# Patient Record
Sex: FEMALE | Race: BLACK OR AFRICAN AMERICAN | NOT HISPANIC OR LATINO | URBAN - METROPOLITAN AREA
[De-identification: names, ages, dates, MRNs, and addresses within clinical notes are randomized per-mention and may not be internally consistent; named-entity substitution may affect disease eponyms.]

---

## 2017-03-16 ENCOUNTER — EMERGENCY (EMERGENCY)
Facility: HOSPITAL | Age: 82
LOS: 1 days | Discharge: HOME | End: 2017-03-16
Attending: EMERGENCY MEDICINE | Admitting: EMERGENCY MEDICINE
Payer: COMMERCIAL

## 2017-03-16 VITALS
HEART RATE: 89 BPM | RESPIRATION RATE: 18 BRPM | OXYGEN SATURATION: 98 % | TEMPERATURE: 98 F | SYSTOLIC BLOOD PRESSURE: 140 MMHG | DIASTOLIC BLOOD PRESSURE: 65 MMHG

## 2017-03-16 DIAGNOSIS — M25.512 PAIN IN LEFT SHOULDER: ICD-10-CM

## 2017-03-16 PROCEDURE — 73030 X-RAY EXAM OF SHOULDER: CPT | Mod: 26,LT

## 2017-03-16 PROCEDURE — 99284 EMERGENCY DEPT VISIT MOD MDM: CPT | Mod: 25

## 2017-03-16 PROCEDURE — 72125 CT NECK SPINE W/O DYE: CPT

## 2017-03-16 PROCEDURE — 73030 X-RAY EXAM OF SHOULDER: CPT

## 2017-03-16 PROCEDURE — 70450 CT HEAD/BRAIN W/O DYE: CPT | Mod: 26

## 2017-03-16 PROCEDURE — 99284 EMERGENCY DEPT VISIT MOD MDM: CPT

## 2017-03-16 PROCEDURE — 72125 CT NECK SPINE W/O DYE: CPT | Mod: 26

## 2017-03-16 PROCEDURE — 70450 CT HEAD/BRAIN W/O DYE: CPT

## 2017-03-16 RX ORDER — ACETAMINOPHEN 500 MG
650 TABLET ORAL ONCE
Qty: 0 | Refills: 0 | Status: DISCONTINUED | OUTPATIENT
Start: 2017-03-16 | End: 2017-03-20

## 2017-03-16 NOTE — ED PROVIDER NOTE - OBJECTIVE STATEMENT
89yof pmhx of RA, hx of TIA BIB EMS s/p mechanical fall at home onto hardwood floor with forehead impact. No loc. No headache, no nausea or vomiting. No hip pain or abd pain. Pt was able to walk down the horvath and stairs at home with the EMS. No neck pain. Daughters at bedside reports no ams and ambulation at home with walker

## 2017-03-16 NOTE — ED PROVIDER NOTE - CARE PLAN
Instructions for follow-up, activity and diet:	1. Follow up with your PMD within 48-72 hours.  2. Rest, Take Tylenol 650mg 1 tab every 4-6 hours as needed for pain .   3. Any nausea, vomiting, worsening pain, dizziness, changes in vision return to ER Principal Discharge DX:	Fall, initial encounter  Instructions for follow-up, activity and diet:	1. Follow up with your PMD within 48-72 hours.  2. Rest, Take Tylenol 650mg 1 tab every 4-6 hours as needed for pain .   3. Any nausea, vomiting, worsening pain, dizziness, changes in vision return to ER

## 2017-03-16 NOTE — ED PROVIDER NOTE - PLAN OF CARE
1. Follow up with your PMD within 48-72 hours.  2. Rest, Take Tylenol 650mg 1 tab every 4-6 hours as needed for pain .   3. Any nausea, vomiting, worsening pain, dizziness, changes in vision return to ER

## 2017-03-16 NOTE — ED PROVIDER NOTE - ATTENDING CONTRIBUTION TO CARE
Dr Reese - 89yo F w hx of RA, TIA in the ER today for mechanical fall at home onto hardwood floor from wheelchair with forehead impact. No LOC, no injuries/complaints. Able to stand and ambulate at baseline w EMS. Exam wnl, min tenderness to L shoulder and diffuse/chr tenderness to cervical spine (at baseline as per pt). Neuro intact, abdomen soft, nontender, pelvis/hips nontender. Will get CT head/cervical spine, Xray should, treat, reevaluate

## 2017-11-20 ENCOUNTER — INPATIENT (INPATIENT)
Facility: HOSPITAL | Age: 82
LOS: 27 days | Discharge: NURSING, REHAB OR LONG-TERM CARE | End: 2017-12-18
Attending: HOSPITALIST | Admitting: HOSPITALIST
Payer: MEDICARE

## 2017-11-20 VITALS
DIASTOLIC BLOOD PRESSURE: 75 MMHG | OXYGEN SATURATION: 100 % | RESPIRATION RATE: 17 BRPM | HEART RATE: 75 BPM | SYSTOLIC BLOOD PRESSURE: 157 MMHG | TEMPERATURE: 98 F

## 2017-11-20 DIAGNOSIS — I63.9 CEREBRAL INFARCTION, UNSPECIFIED: ICD-10-CM

## 2017-11-20 LAB
ALBUMIN SERPL ELPH-MCNC: 3.8 G/DL — SIGNIFICANT CHANGE UP (ref 3.3–5)
ALP SERPL-CCNC: 101 U/L — SIGNIFICANT CHANGE UP (ref 40–120)
ALT FLD-CCNC: 23 U/L — SIGNIFICANT CHANGE UP (ref 4–33)
APTT BLD: 31 SEC — SIGNIFICANT CHANGE UP (ref 27.5–37.4)
AST SERPL-CCNC: 32 U/L — SIGNIFICANT CHANGE UP (ref 4–32)
BASOPHILS # BLD AUTO: 0.02 K/UL — SIGNIFICANT CHANGE UP (ref 0–0.2)
BASOPHILS NFR BLD AUTO: 0.3 % — SIGNIFICANT CHANGE UP (ref 0–2)
BILIRUB SERPL-MCNC: 0.4 MG/DL — SIGNIFICANT CHANGE UP (ref 0.2–1.2)
BUN SERPL-MCNC: 18 MG/DL — SIGNIFICANT CHANGE UP (ref 7–23)
CALCIUM SERPL-MCNC: 9.2 MG/DL — SIGNIFICANT CHANGE UP (ref 8.4–10.5)
CHLORIDE SERPL-SCNC: 104 MMOL/L — SIGNIFICANT CHANGE UP (ref 98–107)
CO2 SERPL-SCNC: 27 MMOL/L — SIGNIFICANT CHANGE UP (ref 22–31)
CREAT SERPL-MCNC: 0.92 MG/DL — SIGNIFICANT CHANGE UP (ref 0.5–1.3)
EOSINOPHIL # BLD AUTO: 0.03 K/UL — SIGNIFICANT CHANGE UP (ref 0–0.5)
EOSINOPHIL NFR BLD AUTO: 0.4 % — SIGNIFICANT CHANGE UP (ref 0–6)
GLUCOSE SERPL-MCNC: 105 MG/DL — HIGH (ref 70–99)
HCT VFR BLD CALC: 38.8 % — SIGNIFICANT CHANGE UP (ref 34.5–45)
HGB BLD-MCNC: 12 G/DL — SIGNIFICANT CHANGE UP (ref 11.5–15.5)
IMM GRANULOCYTES # BLD AUTO: 0.05 # — SIGNIFICANT CHANGE UP
IMM GRANULOCYTES NFR BLD AUTO: 0.7 % — SIGNIFICANT CHANGE UP (ref 0–1.5)
INR BLD: 0.99 — SIGNIFICANT CHANGE UP (ref 0.88–1.17)
LYMPHOCYTES # BLD AUTO: 0.86 K/UL — LOW (ref 1–3.3)
LYMPHOCYTES # BLD AUTO: 11.9 % — LOW (ref 13–44)
MCHC RBC-ENTMCNC: 29.9 PG — SIGNIFICANT CHANGE UP (ref 27–34)
MCHC RBC-ENTMCNC: 30.9 % — LOW (ref 32–36)
MCV RBC AUTO: 96.8 FL — SIGNIFICANT CHANGE UP (ref 80–100)
MONOCYTES # BLD AUTO: 0.47 K/UL — SIGNIFICANT CHANGE UP (ref 0–0.9)
MONOCYTES NFR BLD AUTO: 6.5 % — SIGNIFICANT CHANGE UP (ref 2–14)
NEUTROPHILS # BLD AUTO: 5.77 K/UL — SIGNIFICANT CHANGE UP (ref 1.8–7.4)
NEUTROPHILS NFR BLD AUTO: 80.2 % — HIGH (ref 43–77)
NRBC # FLD: 0 — SIGNIFICANT CHANGE UP
PLATELET # BLD AUTO: 180 K/UL — SIGNIFICANT CHANGE UP (ref 150–400)
PMV BLD: 9 FL — SIGNIFICANT CHANGE UP (ref 7–13)
POTASSIUM SERPL-MCNC: 4 MMOL/L — SIGNIFICANT CHANGE UP (ref 3.5–5.3)
POTASSIUM SERPL-SCNC: 4 MMOL/L — SIGNIFICANT CHANGE UP (ref 3.5–5.3)
PROT SERPL-MCNC: 6.9 G/DL — SIGNIFICANT CHANGE UP (ref 6–8.3)
PROTHROM AB SERPL-ACNC: 11.1 SEC — SIGNIFICANT CHANGE UP (ref 9.8–13.1)
RBC # BLD: 4.01 M/UL — SIGNIFICANT CHANGE UP (ref 3.8–5.2)
RBC # FLD: 15.7 % — HIGH (ref 10.3–14.5)
SODIUM SERPL-SCNC: 143 MMOL/L — SIGNIFICANT CHANGE UP (ref 135–145)
WBC # BLD: 7.2 K/UL — SIGNIFICANT CHANGE UP (ref 3.8–10.5)
WBC # FLD AUTO: 7.2 K/UL — SIGNIFICANT CHANGE UP (ref 3.8–10.5)

## 2017-11-20 PROCEDURE — 70450 CT HEAD/BRAIN W/O DYE: CPT | Mod: 26

## 2017-11-20 RX ORDER — ALTEPLASE 100 MG
6.9 KIT INTRAVENOUS ONCE
Qty: 0 | Refills: 0 | Status: COMPLETED | OUTPATIENT
Start: 2017-11-20 | End: 2017-11-20

## 2017-11-20 RX ORDER — ALTEPLASE 100 MG
62.4 KIT INTRAVENOUS ONCE
Qty: 0 | Refills: 0 | Status: COMPLETED | OUTPATIENT
Start: 2017-11-20 | End: 2017-11-20

## 2017-11-20 RX ADMIN — ALTEPLASE 414 MILLIGRAM(S): KIT at 14:24

## 2017-11-20 RX ADMIN — ALTEPLASE 62.4 MILLIGRAM(S): KIT at 14:28

## 2017-11-20 NOTE — ED PROVIDER NOTE - CRITICAL CARE PROVIDED
additional history taking/consult w/ pt's family directly relating to pts condition/direct patient care (not related to procedure)/consultation with other physicians/interpretation of diagnostic studies/documentation

## 2017-11-20 NOTE — CONSULT NOTE ADULT - SUBJECTIVE AND OBJECTIVE BOX
SUSAN Rodriguez is a 89y old  Female who presents with a chief complaint of     HPI:          MEDICATIONS  (STANDING):  alteplase    Bolus 6.9 milliGRAM(s) IV Bolus once  alteplase    IVPB 62.4 milliGRAM(s) IV Intermittent once    MEDICATIONS  (PRN):    PAST MEDICAL & SURGICAL HISTORY:  Depression  Osteoarthritis  TIA (transient ischemic attack)  Rheumatoid arthritis, adult  S/P appendectomy  Status post total knee replacement: bilateral  History of cholecystectomy    FAMILY HISTORY:  No pertinent family history in first degree relatives    Allergies    ACE inhibitors (Angioedema)  morphine (Unknown)    Intolerances    caffeine (Stomach Upset)  lactose (Other)      SHx - No smoking, No ETOH, No drug abuse      REVIEW OF SYSTEMS:    Constitutional: No fever, weight loss, or fatigue  Eyes: No eye pain, visual disturbances, or discharge  ENMT:  No difficulty hearing, tinnitus, vertigo; No sinus or throat pain  Neck: No pain or stiffness  Respiratory: No cough, wheezing, or shortness of breath  Cardiovascular: No chest pain, palpitations, or leg swelling  Gastrointestinal: No abdominal pain, nausea, vomiting, diarrhea or constipation.   Genitourinary: No dysuria, frequency, hematuria, or incontinence  Neurological: As per HPI  Skin: No itching, burning, rashes, or lesions   Endocrine: No heat or cold intolerance; No hair loss  Musculoskeletal: No joint pain or swelling; No muscle, back, or extremity pain  Psychiatric: No depression, anxiety, mood swings, or difficulty sleeping  Heme/Lymph: No easy bruising or bleeding; No enlarged glands      Vital Signs Last 24 Hrs  T(C): --  T(F): --  HR: --  BP: --  BP(mean): --  RR: --  SpO2: --    General Exam:   General appearance: No acute distress    Cardiac:  Pulm:                 Neurological Exam:  Mental Status: Orientated to self, date and place.  Attention intact.  No dysarthria. Speech fluent.  Cranial Nerves:   PERRL, EOMI, VFF, no nystagmus.    CN V1-3 intact to light touch .  No facial asymmetry.  Hearing intact bilaterally.  Tongue  midline.  Sternocleidomastoid and Trapezius intact bilaterally.    Motor:   Tone: normal.                  Strength:     [] Upper extremity                      Delt       Bicep    Tricep                                                  R         5/5        5/5        5/5       5/5                                               L          5/5        5/5        5/5       5/5  [] Lower extremity                       HF          KE          KF        DF         PF                                               R        5/5        5/5        5/5       5/5       5/5                                               L         5/5 5/5 5/5 5/5 5/5             Dysmetria: None to finger-nose-finger or heel-shin-heel  No truncal ataxia.    Tremor: No resting, postural or action tremor.  No myoclonus.    Sensation: intact to light touch, pinprick, vibration and proprioception    Deep Tendon Reflexes:     Biceps          Triceps      BR        Patellar        Ankle         Babinski                                  R       2+                   2+           2+            2+               2+           downgoing                                  L        2+                  2+           2+            2+               2+           downgoing    Gait: normal.      Other:                Radiology    CT:   MRI  EKG:  tele:  TTE:  EEG: SUSAN Rodriguez is a 89y old  Female who presents with a chief complaint of     HPI:  89 y/o Right- handed - American woman with PMH of CVA 2014 s/p TPA, CVA 2016 with left hemiparesis which resolved, Rheumatoid arthritis on methotrexate, Afib not on AC due to GIB last year. Pt presents with sudden on set of difficulty speaking ad left sided weakness. Pt was at home with her family friend. She got to to the bathroom but when she came out the friend noticed she was not walking properly and not speaking. LKN 11:55am. Pt has hx of GIB 1 week after starting anticoagulation after her last stroke for Afib. At that time GIB was attributed to gastritis from steroid use. Per daughter pt has had no further bleeding since then. Pt BIBEMS. Pt at baseline can feed herself but depends on assistance for other ADLs, walks with a walker with assistance at home but requires a wheel chair outside of the home.     NIHSS 11, MRS 3.     MEDICATIONS  (STANDING):  Folic acid, leflutamide, methotrexate, prednisone, ranitidine,     MEDICATIONS  (PRN):    PAST MEDICAL & SURGICAL HISTORY:  Depression  Osteoarthritis  TIA (transient ischemic attack)  Rheumatoid arthritis, adult  S/P appendectomy  Status post total knee replacement: bilateral  History of cholecystectomy    FAMILY HISTORY:  No pertinent family history in first degree relatives    Allergies: ACE inhibitors (Angioedema)               morphine (Unknown)    Intolerances: caffeine (Stomach Upset), lactose (Other)      SHx - No smoking, No ETOH, No drug abuse      REVIEW OF SYSTEMS:    Constitutional: No fever, weight loss, or fatigue  Eyes: No eye pain, visual disturbances, or discharge  ENMT:  No difficulty hearing, tinnitus, vertigo; No sinus or throat pain  Neck: No pain or stiffness  Respiratory: No cough, wheezing, or shortness of breath  Cardiovascular: No chest pain, palpitations, or leg swelling  Gastrointestinal: No abdominal pain, nausea, vomiting, diarrhea or constipation.   Genitourinary: No dysuria, frequency, hematuria, or incontinence  Neurological: As per HPI  Skin: No itching, burning, rashes, or lesions   Endocrine: No heat or cold intolerance; No hair loss  Musculoskeletal: No joint pain or swelling; No muscle, back, or extremity pain  Psychiatric: No depression, anxiety, mood swings, or difficulty sleeping  Heme/Lymph: No easy bruising or bleeding; No enlarged glands      Vital Signs Last 24 Hrs  T(C): --  T(F): --  HR: --  BP: --  BP(mean): --  RR: --  SpO2: --    General Exam:   General appearance: No acute distress    Cardiac: Afib                  Neurological Exam:  Mental Status: Orientated to self, date and place.  Attention intact.  mild dysarthria. Speech fluent. Follows commands  Cranial Nerves:   PERRL, forced deviation to the right, unable to come to midline with oculocephalics,  left homonymous hemianopsia, no nystagmus.    CN V1-3 intact to light touch .  dense left facial droop.  Hearing intact bilaterally.  Motor:   Tone: normal.                  Strength:   RUE 5/5,   LUE  2/5, able to raise antigravity but drifts to bed in <10seconds, 4/5          RLE drift , LLE falls to bed in <5 seconds  Dysmetria: unable to test   Tremor: No resting, postural or action tremor.  No myoclonus.  Sensation: intact to light touch bilaterally with + extinction on the left. Left visual and tactile neglect    Gait: deferred.        Radiology    CT: No acute evidence of infarct or hemorrhage, Chronic left frontal infarct Sibling  Still living? Unknown  Family history of MI (myocardial infarction), Age at diagnosis: Age Unknown  Family history of stroke, Age at diagnosis: Age Unknown

## 2017-11-20 NOTE — H&P ADULT - HISTORY OF PRESENT ILLNESS
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 Patient lives at home with daughter but has a home health aid that assists with ADLs. Patient went to bathroom at 11:30 and soon after cleaning up, was suddenly unable to lift her left leg/arm. Home health aid also noticed a left sided facial droop and that the patient was unable to speak, although was alert with her eyes open and bobbing her head. The home health aid immediately called daughter for help and patient was brought into ED. At her baseline, she is able to ambulate with a walker, bathe, eat, and perform other ADLs with assistance from the home health aid. Daughter reports that she has been otherwise well recently and has no complaints of recent illness, cough, fever, nausea, vomiting, diarrhea, weakness, no other complaints. Daughter states that patient was admitted to the ICU 1 year ago for a CVA and was put on blood thinners that resulted in her having a massive GI bleed. Blood thinners were discontinued at that time and patient has not been on anticoagulation since. Patient was attending physical therapy from Dec 2016 to February 2017. Daughter also endorses that her mother also had a transient episode of AFib during her last admission but has not been treated for it.

## 2017-11-20 NOTE — CONSULT NOTE ADULT - ATTENDING COMMENTS
I have seen and examined this patient with the stroke neurology team.     History was reviewed with the patient and/or available family members.   ROS: All negative except documented in the HPI.   Neurological exam was performed and agree with exam as documented above.   Laboratory results and imaging studies were reviewed by me.   I agree with the neurology resident note as documented above.    89 years old woman with multiple vascular risk factors including  stroke in 2014 and in 2016 without any residual neurological deficits is evaluated at [facility] for acute onset of left-sided weakness. She presented to [facility] on 11/20 with acute onset left severe hemiparesis, left facial droop and dysarthria/anarthria. CT brain on admission did not show any acute intracranial pathology. She was subsequently treated with IV tPA. Neurological examination today shows eyelid opening apraxia, left gaze palsy, possible left homonymous hemianopsia by blink to threat, UMN type left facial droop, mild-to-moderate dysarthria, left severe hemiparesis (LUE 0/5 and LLE mild withdrawal to painful stimuli) and left ronnie-neglect.     Impression: I have seen and examined this patient with the stroke neurology team.     History was reviewed with the patient and/or available family members.   ROS: All negative except documented in the HPI.   Neurological exam was performed and agree with exam as documented above.   Laboratory results and imaging studies were reviewed by me.   I agree with the neurology resident note as documented above.    89 years old woman with multiple vascular risk factors including  stroke in 2014 and in 2016 without any residual neurological deficits is evaluated at [facility] for acute onset of left-sided weakness. She presented to [facility] on 11/20 with acute onset left severe hemiparesis, left facial droop and dysarthria/anarthria. CT brain on admission did not show any acute intracranial pathology. She was subsequently treated with IV tPA. Neurological examination today shows eyelid opening apraxia, left gaze palsy, possible left homonymous hemianopsia by blink to threat, UMN type left facial droop, mild-to-moderate dysarthria, left severe hemiparesis (LUE 0/5 and LLE mild withdrawal to painful stimuli) and left ronnie-neglect.     Impression:  Cerebral embolism with cerebral infarction. Right MCA distribution stroke - likely etiology being embolism from undetermined source like large vessel disease leading to artery to artery embolism versus embolism from a proximal source like cardiac source of embolism    Plan:  - Continue with 24 hours post IV tPA precautions including blood pressure be less than 180/105 mmHg for first 24 hours after tPA administration followed by gradual normotension  - CT brain at 24 hours from IV tPA administration and consider starting aspirin and pharmacological DVT prophylaxis, permitting no acute findings on CT brain  - Atorvastatin 80 mg at bedtime once able to pass the swallow evaluation or stable enteral access is established  - SCDs for DVT prophylaxis in the interim  - CUS to evaluate for cerebral vasculature  - No indications for performing MRI at this time as it would not likely change the management  - Transthoracic echocardiogram with bubble study and continue with telemetry to screen for possible cardiac source of embolism  - HbA1c 5.4,   - PT/OT/speech and swallow evaluation    Above-mentioned plan was discussed with patient and available family members at bedside in detail. All the questions were answered and concerns were addressed.

## 2017-11-20 NOTE — CONSULT NOTE ADULT - ASSESSMENT
89 y/o RH woman with hx of CVA in 2014/2016. afib not on AC, GIB, p/w left facial droop, left hemiparesis, left homonymous hemianopsia, left sided extinction and neglect consistent with a RMCA syndrome. Based on pt's LNK she is still within the window for TPA. Risks and benefits discussed with her daughter as bedside who agreed to go forward with TPA. Pt not an endovascular candidate based on her MRS of 2-3. TPA administration delayed due to difficulty confirming LKN and also diffculty placing IV lines due to patients anatomy. TPA administered at         - Permissive HTN for 24hrs  180/105 if TPA given  - CTH 24hrs after TPA given  - Aspirin and DVT ppx if no evidence of hemorrhage  - dysphagia screen  - CTA head and neck  - MRI head w/o con  - MRA head w/o and neck w/con  - telemetry monitorin  - TTE w/bubble study  - Check A1c and Lipid panel  - Start Lipitor 80mg daily  - DVT ppx  - S&S eval, PT, OT 89 y/o RH woman with hx of CVA in 2014/2016. afib not on AC, GIB, p/w left facial droop, left hemiparesis, left homonymous hemianopsia, left sided extinction and neglect consistent with a RMCA syndrome. Based on pt's LNK she is still within the window for TPA. Risks and benefits discussed with her daughter as bedside who agreed to go forward with TPA. Pt not an endovascular candidate based on her MRS of 2-3. TPA administration delayed due to difficulty confirming LKN and also difficulty placing IV lines due to patients anatomy. TPA administered at 14:23.       Plan:    - Permissive HTN for 24hrs up to  180/105 as  TPA given  - Post TPA protocol  -neuro checks and vitals q1 hrs  - CTH 24hrs after TPA   - Aspirin and DVT ppx if no evidence of hemorrhage  - dysphagia screen  - CTA head and neck  - MRI head w/o con  - MRA head w/o and neck w/con  - telemetry monitoring  - TTE w/bubble study  - Check A1c and Lipid panel  - Start Lipitor 80mg daily once cleared for PO.  - DVT ppx  - S&S eval, PT, OT 89 y/o RH woman with hx of CVA in 2014/2016. afib not on AC, GIB, p/w left facial droop, left hemiparesis, left homonymous hemianopsia, left sided extinction and neglect consistent with a RMCA syndrome. Based on pt's LNK she is still within the window for TPA. Risks and benefits discussed with her daughter as bedside who agreed to go forward with TPA. Pt not an endovascular candidate based on her MRS of 2-3. TPA administration delayed due to difficulty confirming LKN and also difficulty placing IV lines due to patients anatomy. TPA administered at 14:23.       Plan:    - Permissive HTN for 24hrs up to  180/105 as  TPA given  - Post TPA protocol  -neuro checks and vitals q1 hrs  - CTH 24hrs after TPA   - Aspirin and DVT ppx if no evidence of hemorrhage  - dysphagia screen  - CTA head and neck to be done in the ED  - MRI head w/o con  - MRA head w/o and neck w/con ( If CTA not done)  - telemetry monitoring  - TTE w/bubble study  - Check A1c and Lipid panel  - Start Lipitor 80mg daily once cleared for PO.  - DVT ppx  - S&S eval, PT, OT

## 2017-11-20 NOTE — ED PROVIDER NOTE - OBJECTIVE STATEMENT
89F h/o CVA p/w L-sided facial droop, slurred speech, and L arm/leg weakness, onset at 12pm today. 89F h/o CVA p/w L-sided facial droop, slurred speech, and L arm/leg weakness, onset at 12pm today. No CP, N/V, or abd pain.  in the field as per EMS. 89F h/o CVA p/w L-sided facial droop, slurred speech, and L arm/leg weakness, onset at 12pm today. Last normal approx 11:50am. No CP, N/V, or abd pain.  in the field. Pt had CVA a year ago.
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
## 2017-11-20 NOTE — H&P ADULT - NSHPLABSRESULTS_GEN_ALL_CORE
.  Labs reviewed personally.                          12.0   7.20  )-----------( 180      ( 20 Nov 2017 14:20 )             38.8     Hgb Trend: 12.0<--  11-20    143  |  104  |  18  ----------------------------<  105<H>  4.0   |  27  |  0.92    Ca    9.2      20 Nov 2017 14:20    TPro  6.9  /  Alb  3.8  /  TBili  0.4  /  DBili  x   /  AST  32  /  ALT  23  /  AlkPhos  101  11-20    Creatinine Trend: 0.92<--  PT/INR - ( 20 Nov 2017 14:20 )   PT: 11.1 SEC;   INR: 0.99          PTT - ( 20 Nov 2017 14:20 )  PTT:31.0 SEC      Imaging reviewed personally.

## 2017-11-20 NOTE — STROKE CODE NOTE - NIH STROKE SCALE: 1B. LOC QUESTIONS, QM
Ciara Lin, [facility]:[id number]    CONSENT FOR PROCEDURE/SEDATION    1. I authorize the performance upon Liseth Valentine  the following: Colposcopy with biopsy and Endocervical curettage    2.  I authorize Dr. Giancarlo Gibson MD (and whomever is designated as t Relationship to patient: ____________________________________________    Witness: _________________________________________ Date:___________     Physician Signature: _______________________________ Date:___________ (0) Answers both questions correctly

## 2017-11-20 NOTE — ED ADULT NURSE NOTE - OBJECTIVE STATEMENT
RN facilitator. Pt received to room 23 as code stroke for L sided weakness and slurred speech since 'around 12' this afternoon per family at baseline. Pt presents awake, alert, and oriented x4, L sided facial droop observed. Respirations appearing even, unlabored. Pt denies pain. Pt ambulatory at baseline with walker, denies recent falls. 20g PIV placed via guided ultrasound in L upper arm by MD BREN Herron. Neurology assessment documented per flow. Unable to assess patients R pupil as patient forces eye shut during assessment attempt. TPA bolus administered by neurology service, gtt initiated per orders. . Report endorsed to facilitator KAITY HERNANDEZ Family remains at bedside. Safety maintained, will continue to monitor. RN facilitator. Pt received to room 23 as code stroke for L sided weakness and slurred speech since 'around 12' this afternoon per family at baseline. Pt presents awake, alert, and oriented x4, L sided facial droop observed. Respirations appearing even, unlabored. Pt denies pain. Pt ambulatory at baseline with walker, denies recent falls. 20g PIV placed via guided ultrasound in L upper arm by MD BREN Herron. Neurology assessment documented per flow. Unable to assess patients R pupil as patient forces eye shut during assessment attempt. TPA bolus administered by neurology service, gtt initiated per orders. . Report endorsed to facilitator KAITY HERNANDEZ Family remains at bedside. Safety maintained, will continue to monitor.    Has right sided stage 2pressure ulcer to right upper buttocks measuring 0.5cmx0.5cm. Has area on left buttocks that appears to be 3 small stage 2 pressure ulcers measuring 0.5cmx0.5cm to left buttocks. Area around left pressure wound appears darkened. Family states visiting wound care nurse told them the skin may open up where the darkened area is. Wounds dressed with mepilex dressing. Clean and dry.

## 2017-11-20 NOTE — H&P ADULT - ASSESSMENT
89F hx of CVA 2016 with no residual weakness, PUD, rheumatoid arthritis, Osteoarthritis with b/l knee replacements p/w L facial droop, LUE, and LLE weakness.    Neuro:  Patient was started on TPA in the ED with systolic bp goal 120-140. IV infiltrated soon after initiating tpa and was stopped.  Schedule Echo/Carotid Duplex studies    Cardiac:  - Systolic bp goal initiated in -140  - Currently elevated to 180's  - Unable to place IV line at this time given recent tPA administration    GI:  - continue home medications  - Pantoprazole 40qd  - Ranitidine 150 bid    MSK:  Continue Rheumatoid arthritis meds  Leflunomide 20 qd  Prednisone 5mg qd  Pain is under control at this time. Patient does not want pain medication at this time.  Patient states she is not allowed to take nsaids due to her history of PUD and GI bleeding.    Code status:  Daughter (health care proxy) states that she has discussed end of life goals with her mother in the past and they would like to sign a DNR/DNI form.

## 2017-11-20 NOTE — ED PROVIDER NOTE - ATTENDING CONTRIBUTION TO CARE
Dr. Thompson: I have personally performed a face to face bedside history and physical examination of this patient. I have discussed the history, examination, review of systems, assessment and plan of management with the resident. I have reviewed the electronic medical record and amended it to reflect my history, review of systems, physical exam, assessment and plan.  89F pw L sided hemiparesis since 11:50am, ho CVA in the past with no residual deficits. No fevers or chills, no cp or sob. No dysuria or hematuria. Code stroke called upon arrival.  Exam significant for L facial droop, 2/5 strength LUE, 4/5 strength LLE.  Plan - code stroke, ct, tpa, micu

## 2017-11-20 NOTE — H&P ADULT - NSHPREVIEWOFSYSTEMS_GEN_ALL_CORE
CONST: no fevers, no chills  EYES: no pain  ENT: no sore throat   CV: no chest pain  RESP: no shortness of breath  ABD: no abdominal pain   : no dysuria  MSK: no back pain  NEURO: no headache or additional neurologic complaints. No vision changes.  HEME: no easy bleeding  SKIN:  no rash

## 2017-11-20 NOTE — ED PROVIDER NOTE - PROGRESS NOTE DETAILS
VENKAT NOTE: pw w/ new sudden onset L sided weakness, Hx of CVA, not on AC 2/2 to GI bleed.   a/p: concern for CVA, glucose WNL - code stroke called. Dr. Thompson: Pt taken immediately to CT by VENKAT after activation of code stroke, neuro team at bedside with ED resident with patient in CT. tPA being mixed simultaneously by pharmacy. Pt held in CT for possible CTA prior to tPA recommended by neuro, but brought back to room to administer tPA prior to CTA. Dr. Thompson: Informed by RN difficulty in obtaining IV access for tPA. US machine brought by resident to room to obtain IV access. Able to 1 IV access, unable to obtain 2nd IV emergently, given risk vs benefit of delay in tPA administration, tPA administered through the first IV. tPA time out form signed by neuro, myself and resident prior to administration. Dr. Thompson: Pt taken immediately to CT by VENKAT after activation of code stroke, neuro team at bedside with ED resident with patient in CT. tPA being mixed simultaneously by pharmacy. Pt held in CT for possible CTA prior to tPA recommended by neuro for possible transfer to [facility] for endovascular intervention, however determination made to bring pt back to room to administer tPA prior to CTA. Dr. Thompson: Informed by RN difficulty in obtaining IV access for tPA. US machine brought by resident to room to obtain IV access. Able to 1 IV access after 2 attempts via US, unable to obtain 2nd IV emergently, given risk vs benefit of further delay in tPA administration, tPA administered through the first IV. tPA time out form signed by neuro, myself and resident prior to administration. Gollogly: tPA delayed 2/2 difficulty obtaining IV access. RN unable to place IV. Was able to get an IV in the L basilic vein using US. Did not delay tPA administration further by getting a 2nd IV.

## 2017-11-21 LAB
ALBUMIN SERPL ELPH-MCNC: 3.6 G/DL — SIGNIFICANT CHANGE UP (ref 3.3–5)
ALP SERPL-CCNC: 96 U/L — SIGNIFICANT CHANGE UP (ref 40–120)
ALT FLD-CCNC: 17 U/L — SIGNIFICANT CHANGE UP (ref 4–33)
AST SERPL-CCNC: 31 U/L — SIGNIFICANT CHANGE UP (ref 4–32)
BASOPHILS # BLD AUTO: 0.04 K/UL — SIGNIFICANT CHANGE UP (ref 0–0.2)
BASOPHILS NFR BLD AUTO: 0.5 % — SIGNIFICANT CHANGE UP (ref 0–2)
BILIRUB SERPL-MCNC: 0.7 MG/DL — SIGNIFICANT CHANGE UP (ref 0.2–1.2)
BUN SERPL-MCNC: 16 MG/DL — SIGNIFICANT CHANGE UP (ref 7–23)
CALCIUM SERPL-MCNC: 9 MG/DL — SIGNIFICANT CHANGE UP (ref 8.4–10.5)
CHLORIDE SERPL-SCNC: 105 MMOL/L — SIGNIFICANT CHANGE UP (ref 98–107)
CHOLEST SERPL-MCNC: 191 MG/DL — SIGNIFICANT CHANGE UP (ref 120–199)
CO2 SERPL-SCNC: 26 MMOL/L — SIGNIFICANT CHANGE UP (ref 22–31)
CREAT SERPL-MCNC: 0.81 MG/DL — SIGNIFICANT CHANGE UP (ref 0.5–1.3)
EOSINOPHIL # BLD AUTO: 0.08 K/UL — SIGNIFICANT CHANGE UP (ref 0–0.5)
EOSINOPHIL NFR BLD AUTO: 0.9 % — SIGNIFICANT CHANGE UP (ref 0–6)
GLUCOSE SERPL-MCNC: 90 MG/DL — SIGNIFICANT CHANGE UP (ref 70–99)
HBA1C BLD-MCNC: 5.4 % — SIGNIFICANT CHANGE UP (ref 4–5.6)
HCT VFR BLD CALC: 34.4 % — LOW (ref 34.5–45)
HDLC SERPL-MCNC: 71 MG/DL — HIGH (ref 45–65)
HGB BLD-MCNC: 11.3 G/DL — LOW (ref 11.5–15.5)
IMM GRANULOCYTES # BLD AUTO: 0.09 # — SIGNIFICANT CHANGE UP
IMM GRANULOCYTES NFR BLD AUTO: 1 % — SIGNIFICANT CHANGE UP (ref 0–1.5)
LIPID PNL WITH DIRECT LDL SERPL: 120 MG/DL — SIGNIFICANT CHANGE UP
LYMPHOCYTES # BLD AUTO: 2.19 K/UL — SIGNIFICANT CHANGE UP (ref 1–3.3)
LYMPHOCYTES # BLD AUTO: 24.9 % — SIGNIFICANT CHANGE UP (ref 13–44)
MAGNESIUM SERPL-MCNC: 1.8 MG/DL — SIGNIFICANT CHANGE UP (ref 1.6–2.6)
MCHC RBC-ENTMCNC: 31.6 PG — SIGNIFICANT CHANGE UP (ref 27–34)
MCHC RBC-ENTMCNC: 32.8 % — SIGNIFICANT CHANGE UP (ref 32–36)
MCV RBC AUTO: 96.1 FL — SIGNIFICANT CHANGE UP (ref 80–100)
MONOCYTES # BLD AUTO: 0.76 K/UL — SIGNIFICANT CHANGE UP (ref 0–0.9)
MONOCYTES NFR BLD AUTO: 8.6 % — SIGNIFICANT CHANGE UP (ref 2–14)
NEUTROPHILS # BLD AUTO: 5.63 K/UL — SIGNIFICANT CHANGE UP (ref 1.8–7.4)
NEUTROPHILS NFR BLD AUTO: 64.1 % — SIGNIFICANT CHANGE UP (ref 43–77)
NRBC # FLD: 0 — SIGNIFICANT CHANGE UP
PHOSPHATE SERPL-MCNC: 2.8 MG/DL — SIGNIFICANT CHANGE UP (ref 2.5–4.5)
PLATELET # BLD AUTO: 194 K/UL — SIGNIFICANT CHANGE UP (ref 150–400)
PMV BLD: 9.3 FL — SIGNIFICANT CHANGE UP (ref 7–13)
POTASSIUM SERPL-MCNC: 4.2 MMOL/L — SIGNIFICANT CHANGE UP (ref 3.5–5.3)
POTASSIUM SERPL-SCNC: 4.2 MMOL/L — SIGNIFICANT CHANGE UP (ref 3.5–5.3)
PROT SERPL-MCNC: 6.6 G/DL — SIGNIFICANT CHANGE UP (ref 6–8.3)
RBC # BLD: 3.58 M/UL — LOW (ref 3.8–5.2)
RBC # FLD: 15.4 % — HIGH (ref 10.3–14.5)
SODIUM SERPL-SCNC: 143 MMOL/L — SIGNIFICANT CHANGE UP (ref 135–145)
TRIGL SERPL-MCNC: 93 MG/DL — SIGNIFICANT CHANGE UP (ref 10–149)
WBC # BLD: 8.79 K/UL — SIGNIFICANT CHANGE UP (ref 3.8–10.5)
WBC # FLD AUTO: 8.79 K/UL — SIGNIFICANT CHANGE UP (ref 3.8–10.5)

## 2017-11-21 PROCEDURE — 93306 TTE W/DOPPLER COMPLETE: CPT | Mod: 26

## 2017-11-21 PROCEDURE — 99291 CRITICAL CARE FIRST HOUR: CPT

## 2017-11-21 PROCEDURE — 71010: CPT | Mod: 26

## 2017-11-21 PROCEDURE — 70450 CT HEAD/BRAIN W/O DYE: CPT | Mod: 26

## 2017-11-21 RX ORDER — PANTOPRAZOLE SODIUM 20 MG/1
40 TABLET, DELAYED RELEASE ORAL DAILY
Qty: 0 | Refills: 0 | Status: DISCONTINUED | OUTPATIENT
Start: 2017-11-21 | End: 2017-11-28

## 2017-11-21 RX ORDER — ASPIRIN/CALCIUM CARB/MAGNESIUM 324 MG
81 TABLET ORAL DAILY
Qty: 0 | Refills: 0 | Status: DISCONTINUED | OUTPATIENT
Start: 2017-11-21 | End: 2017-11-21

## 2017-11-21 RX ORDER — ACETAMINOPHEN 500 MG
650 TABLET ORAL EVERY 6 HOURS
Qty: 0 | Refills: 0 | Status: DISCONTINUED | OUTPATIENT
Start: 2017-11-21 | End: 2017-12-18

## 2017-11-21 RX ORDER — ASPIRIN/CALCIUM CARB/MAGNESIUM 324 MG
300 TABLET ORAL DAILY
Qty: 0 | Refills: 0 | Status: DISCONTINUED | OUTPATIENT
Start: 2017-11-21 | End: 2017-11-28

## 2017-11-21 RX ORDER — ACETAMINOPHEN 500 MG
650 TABLET ORAL ONCE
Qty: 0 | Refills: 0 | Status: COMPLETED | OUTPATIENT
Start: 2017-11-21 | End: 2017-11-21

## 2017-11-21 RX ORDER — HEPARIN SODIUM 5000 [USP'U]/ML
5000 INJECTION INTRAVENOUS; SUBCUTANEOUS EVERY 8 HOURS
Qty: 0 | Refills: 0 | Status: DISCONTINUED | OUTPATIENT
Start: 2017-11-21 | End: 2017-11-24

## 2017-11-21 RX ORDER — SODIUM CHLORIDE 9 MG/ML
1000 INJECTION, SOLUTION INTRAVENOUS
Qty: 0 | Refills: 0 | Status: DISCONTINUED | OUTPATIENT
Start: 2017-11-21 | End: 2017-11-22

## 2017-11-21 RX ADMIN — Medication 650 MILLIGRAM(S): at 14:00

## 2017-11-21 RX ADMIN — SODIUM CHLORIDE 50 MILLILITER(S): 9 INJECTION, SOLUTION INTRAVENOUS at 18:26

## 2017-11-21 RX ADMIN — PANTOPRAZOLE SODIUM 40 MILLIGRAM(S): 20 TABLET, DELAYED RELEASE ORAL at 13:00

## 2017-11-21 RX ADMIN — SODIUM CHLORIDE 50 MILLILITER(S): 9 INJECTION, SOLUTION INTRAVENOUS at 20:39

## 2017-11-21 RX ADMIN — Medication 1 DROP(S): at 16:58

## 2017-11-21 RX ADMIN — SODIUM CHLORIDE 50 MILLILITER(S): 9 INJECTION, SOLUTION INTRAVENOUS at 22:05

## 2017-11-21 NOTE — CHART NOTE - NSCHARTNOTEFT_GEN_A_CORE
MICU Transfer Note    Transfer from: MICU  Transfer to:  (  ) Medicine    (x) Telemetry    (  ) RCU    (  ) Palliative    (  ) Stroke Unit    (  ) _______________  Accepting physican:      MICU COURSE:  89F hx of CVA 11/2016, osteoarthritis, rheumatoid arthritis, PUD, a/m to micu for ischemic CVA. Patient received TPA in the ED but IV line was subsequently infiltrated. Patient got hourly neuro checks. She has been hemodynamically stable while in the MICU. Speech and swallow evaluation was attempted today but patient was too tired to proceed.        ASSESSMENT & PLAN:     89F with hx of CVA, OA, RA, PUD, p/w L sided weakness. She received tpa in the ED and soon after, the IV line infiltrated.    Neuro:  - tpa infiltrated into arm. subsequently dc'd.   - Repeat CT scan negative for hemorrhage  - Order cardotid duplex  - Order echo  - speech and swallow evaluation  - Initiate PT    CV:  - Echo    RESP:  - Patient noted to have dry, intermittent cough  - CXR to r/o pneumonia    GI:  NPO for now. Will start on PO diet after successful speech and swallow eval    ID:  Patient developed fever to 100.7 today. Will order tylenol suppository.  Will obtain UA to r/o uti    Endocrine:  No history of diabetes. A1c 5. Will begin on normal diet after speech and swallow eval.      For Follow-Up:          Vital Signs Last 24 Hrs  T(C): 37.9 (21 Nov 2017 16:00), Max: 38.2 (21 Nov 2017 12:00)  T(F): 100.2 (21 Nov 2017 16:00), Max: 100.7 (21 Nov 2017 12:00)  HR: 70 (21 Nov 2017 16:00) (70 - 86)  BP: 151/57 (21 Nov 2017 16:00) (127/100 - 185/74)  BP(mean): 82 (21 Nov 2017 16:00) (76 - 105)  RR: 14 (21 Nov 2017 16:00) (14 - 22)  SpO2: 100% (21 Nov 2017 16:00) (97% - 100%)  I&O's Summary        MEDICATIONS  (STANDING):  artificial  tears Solution 1 Drop(s) Both EYES once  pantoprazole  Injectable 40 milliGRAM(s) IV Push daily    MEDICATIONS  (PRN):        LABS                                            11.3                  Neurophils% (auto):   64.1   (11-21 @ 02:00):    8.79 )-----------(194          Lymphocytes% (auto):  24.9                                          34.4                   Eosinphils% (auto):   0.9      Manual%: Neutrophils x    ; Lymphocytes x    ; Eosinophils x    ; Bands%: x    ; Blasts x                                    143    |  105    |  16                  Calcium: 9.0   / iCa: x      (11-21 @ 02:00)    ----------------------------<  90        Magnesium: 1.8                              4.2     |  26     |  0.81             Phosphorous: 2.8      TPro  6.6    /  Alb  3.6    /  TBili  0.7    /  DBili  x      /  AST  31     /  ALT  17     /  AlkPhos  96     21 Nov 2017 02:00

## 2017-11-21 NOTE — PROGRESS NOTE ADULT - ASSESSMENT
89F with hx of CVA, OA, RA, PUD, p/w L sided weakness. She received tpa in the ED and soon after, the IV line infiltrated.    Neuro:  - tpa infiltrated into arm. subsequently dc'd.   - Repeat CT scan at 24 hours and f/u MRI  - Order cardotid duplex  - Order echo  - speech and swallow evaluation  - transfer to neuro unit    CV:  - Echo    RESP:  - Patient noted to have dry, intermittent cough  - CXR to r/o pneumonia    GI:  NPO for now. Will start on PO diet after successful speech and swallow eval    ID:  Patient developed fever to 100.7 today. Will order tylenol suppository.  Will obtain UA to r/o uti    Endocrine:  No history of diabetes. A1c 5. Will begin on normal diet after speech and swallow eval.

## 2017-11-21 NOTE — PROGRESS NOTE ADULT - SUBJECTIVE AND OBJECTIVE BOX
CHIEF COMPLAINT:  Interval Events:    REVIEW OF SYSTEMS:  Constitutional: [x] negative [ ] fevers [ ] chills [ ] weight loss [ ] weight gain  HEENT: [ ] negative [ ] dry eyes [x] R eye irritation [ ] postnasal drip [ ] nasal congestion  CV: [x] negative  [ ] chest pain [ ] orthopnea [ ] palpitations [ ] murmur  Resp: [x] negative [ ] cough [ ] shortness of breath [ ] dyspnea [ ] wheezing [ ] sputum [ ] hemoptysis  GI: [x] negative [ ] nausea [ ] vomiting [ ] diarrhea [ ] constipation [ ] abd pain [ ] dysphagia   : [x] negative [ ] dysuria [ ] nocturia [ ] hematuria [ ] increased urinary frequency  Musculoskeletal: [x] negative [ ] back pain [ ] myalgias [ ] arthralgias [ ] fracture  Skin: [x] negative [ ] rash [ ] itch  Neurological: [ ] negative [ ] headache [ ] dizziness [ ] syncope [x] weakness (LUE, LLE, general)  Psychiatric: [x] negative [ ] anxiety [ ] depression  Endocrine: [x] negative [ ] diabetes [ ] thyroid problem  Hematologic/Lymphatic: [x] negative [ ] anemia [ ] bleeding problem  Allergic/Immunologic: [x] negative [ ] itchy eyes [ ] nasal discharge [ ] hives [ ] angioedema  [ ] All other systems negative  [ ] Unable to assess ROS because ________    OBJECTIVE:  ICU Vital Signs Last 24 Hrs  T(C): 38.2 (21 Nov 2017 12:00), Max: 38.2 (21 Nov 2017 12:00)  T(F): 100.7 (21 Nov 2017 12:00), Max: 100.7 (21 Nov 2017 12:00)  HR: 71 (21 Nov 2017 12:00) (71 - 89)  BP: 151/55 (21 Nov 2017 12:00) (144/69 - 185/74)  BP(mean): 80 (21 Nov 2017 12:00) (79 - 123)  ABP: --  ABP(mean): --  RR: 21 (21 Nov 2017 12:00) (17 - 22)  SpO2: 100% (21 Nov 2017 12:00) (97% - 100%)        CAPILLARY BLOOD GLUCOSE      POCT Blood Glucose.: 100 mg/dL (20 Nov 2017 14:10)      PHYSICAL EXAM:  General: Calm, cooperative, NAD A&Ox3. Responding appropriately to commands.  HEENT: L sided facial droop. Rightward gaze preference. dry mucous membranes  Lymph Nodes: no cervical lymphadenopathy  Neck: supple, no JVD  Respiratory: CTA b/l no wheezes, rales, ronchi. sufficient respiratory effort  Cardiovascular: +s1/s2, no murmurs, rubs, gallops  Abdomen: soft, nontender, nondistended  Extremities: 2+ pulses in distal extremities b/l  Skin: warm, dry, non cyanosis, clubbing, edema  Neurological: L sided weakness 4/5 on LUE and LLE. Patient has right sided gaze preference, dysarthria. L sided facial droop. Sensory intact. Able to raise eyebrows.  Psychiatry:    LINES:    HOSPITAL MEDICATIONS:  Standing Meds:  pantoprazole  Injectable 40 milliGRAM(s) IV Push daily      PRN Meds:      LABS:                        11.3   8.79  )-----------( 194      ( 21 Nov 2017 02:00 )             34.4     Hgb Trend: 11.3<--, 12.0<--  11-21    143  |  105  |  16  ----------------------------<  90  4.2   |  26  |  0.81    Ca    9.0      21 Nov 2017 02:00  Phos  2.8     11-21  Mg     1.8     11-21    TPro  6.6  /  Alb  3.6  /  TBili  0.7  /  DBili  x   /  AST  31  /  ALT  17  /  AlkPhos  96  11-21    Creatinine Trend: 0.81<--, 0.92<--  PT/INR - ( 20 Nov 2017 14:20 )   PT: 11.1 SEC;   INR: 0.99          PTT - ( 20 Nov 2017 14:20 )  PTT:31.0 SEC          MICROBIOLOGY:     RADIOLOGY:  [ ] Reviewed and interpreted by me    EKG:

## 2017-11-22 ENCOUNTER — TRANSCRIPTION ENCOUNTER (OUTPATIENT)
Age: 82
End: 2017-11-22

## 2017-11-22 DIAGNOSIS — I63.9 CEREBRAL INFARCTION, UNSPECIFIED: ICD-10-CM

## 2017-11-22 DIAGNOSIS — F32.9 MAJOR DEPRESSIVE DISORDER, SINGLE EPISODE, UNSPECIFIED: ICD-10-CM

## 2017-11-22 DIAGNOSIS — I69.359 HEMIPLEGIA AND HEMIPARESIS FOLLOWING CEREBRAL INFARCTION AFFECTING UNSPECIFIED SIDE: ICD-10-CM

## 2017-11-22 DIAGNOSIS — I69.391 DYSPHAGIA FOLLOWING CEREBRAL INFARCTION: ICD-10-CM

## 2017-11-22 DIAGNOSIS — Z29.9 ENCOUNTER FOR PROPHYLACTIC MEASURES, UNSPECIFIED: ICD-10-CM

## 2017-11-22 DIAGNOSIS — M19.90 UNSPECIFIED OSTEOARTHRITIS, UNSPECIFIED SITE: ICD-10-CM

## 2017-11-22 DIAGNOSIS — M06.9 RHEUMATOID ARTHRITIS, UNSPECIFIED: ICD-10-CM

## 2017-11-22 DIAGNOSIS — I48.0 PAROXYSMAL ATRIAL FIBRILLATION: ICD-10-CM

## 2017-11-22 DIAGNOSIS — G93.40 ENCEPHALOPATHY, UNSPECIFIED: ICD-10-CM

## 2017-11-22 DIAGNOSIS — R13.12 DYSPHAGIA, OROPHARYNGEAL PHASE: ICD-10-CM

## 2017-11-22 LAB
APTT BLD: 29.9 SEC — SIGNIFICANT CHANGE UP (ref 27.5–37.4)
BASOPHILS # BLD AUTO: 0.03 K/UL — SIGNIFICANT CHANGE UP (ref 0–0.2)
BASOPHILS NFR BLD AUTO: 0.5 % — SIGNIFICANT CHANGE UP (ref 0–2)
BUN SERPL-MCNC: 16 MG/DL — SIGNIFICANT CHANGE UP (ref 7–23)
CALCIUM SERPL-MCNC: 8.4 MG/DL — SIGNIFICANT CHANGE UP (ref 8.4–10.5)
CHLORIDE SERPL-SCNC: 103 MMOL/L — SIGNIFICANT CHANGE UP (ref 98–107)
CO2 SERPL-SCNC: 22 MMOL/L — SIGNIFICANT CHANGE UP (ref 22–31)
CREAT SERPL-MCNC: 0.88 MG/DL — SIGNIFICANT CHANGE UP (ref 0.5–1.3)
EOSINOPHIL # BLD AUTO: 0.1 K/UL — SIGNIFICANT CHANGE UP (ref 0–0.5)
EOSINOPHIL NFR BLD AUTO: 1.6 % — SIGNIFICANT CHANGE UP (ref 0–6)
GLUCOSE SERPL-MCNC: 70 MG/DL — SIGNIFICANT CHANGE UP (ref 70–99)
HCT VFR BLD CALC: 36.5 % — SIGNIFICANT CHANGE UP (ref 34.5–45)
HGB BLD-MCNC: 11.8 G/DL — SIGNIFICANT CHANGE UP (ref 11.5–15.5)
IMM GRANULOCYTES # BLD AUTO: 0.05 # — SIGNIFICANT CHANGE UP
IMM GRANULOCYTES NFR BLD AUTO: 0.8 % — SIGNIFICANT CHANGE UP (ref 0–1.5)
INR BLD: 1.1 — SIGNIFICANT CHANGE UP (ref 0.88–1.17)
LYMPHOCYTES # BLD AUTO: 1.31 K/UL — SIGNIFICANT CHANGE UP (ref 1–3.3)
LYMPHOCYTES # BLD AUTO: 20.4 % — SIGNIFICANT CHANGE UP (ref 13–44)
MAGNESIUM SERPL-MCNC: 1.7 MG/DL — SIGNIFICANT CHANGE UP (ref 1.6–2.6)
MCHC RBC-ENTMCNC: 31.4 PG — SIGNIFICANT CHANGE UP (ref 27–34)
MCHC RBC-ENTMCNC: 32.3 % — SIGNIFICANT CHANGE UP (ref 32–36)
MCV RBC AUTO: 97.1 FL — SIGNIFICANT CHANGE UP (ref 80–100)
MONOCYTES # BLD AUTO: 0.65 K/UL — SIGNIFICANT CHANGE UP (ref 0–0.9)
MONOCYTES NFR BLD AUTO: 10.1 % — SIGNIFICANT CHANGE UP (ref 2–14)
NEUTROPHILS # BLD AUTO: 4.27 K/UL — SIGNIFICANT CHANGE UP (ref 1.8–7.4)
NEUTROPHILS NFR BLD AUTO: 66.6 % — SIGNIFICANT CHANGE UP (ref 43–77)
NRBC # FLD: 0 — SIGNIFICANT CHANGE UP
PHOSPHATE SERPL-MCNC: 2.8 MG/DL — SIGNIFICANT CHANGE UP (ref 2.5–4.5)
PLATELET # BLD AUTO: 140 K/UL — LOW (ref 150–400)
PMV BLD: 9.3 FL — SIGNIFICANT CHANGE UP (ref 7–13)
POTASSIUM SERPL-MCNC: 4.4 MMOL/L — SIGNIFICANT CHANGE UP (ref 3.5–5.3)
POTASSIUM SERPL-SCNC: 4.4 MMOL/L — SIGNIFICANT CHANGE UP (ref 3.5–5.3)
PROTHROM AB SERPL-ACNC: 12.4 SEC — SIGNIFICANT CHANGE UP (ref 9.8–13.1)
RBC # BLD: 3.76 M/UL — LOW (ref 3.8–5.2)
RBC # FLD: 15.7 % — HIGH (ref 10.3–14.5)
SODIUM SERPL-SCNC: 138 MMOL/L — SIGNIFICANT CHANGE UP (ref 135–145)
WBC # BLD: 6.41 K/UL — SIGNIFICANT CHANGE UP (ref 3.8–10.5)
WBC # FLD AUTO: 6.41 K/UL — SIGNIFICANT CHANGE UP (ref 3.8–10.5)

## 2017-11-22 PROCEDURE — 99233 SBSQ HOSP IP/OBS HIGH 50: CPT

## 2017-11-22 PROCEDURE — 99222 1ST HOSP IP/OBS MODERATE 55: CPT | Mod: GC

## 2017-11-22 PROCEDURE — 93880 EXTRACRANIAL BILAT STUDY: CPT | Mod: 26

## 2017-11-22 RX ORDER — SODIUM CHLORIDE 9 MG/ML
1000 INJECTION, SOLUTION INTRAVENOUS
Qty: 0 | Refills: 0 | Status: DISCONTINUED | OUTPATIENT
Start: 2017-11-22 | End: 2017-11-29

## 2017-11-22 RX ADMIN — PANTOPRAZOLE SODIUM 40 MILLIGRAM(S): 20 TABLET, DELAYED RELEASE ORAL at 13:56

## 2017-11-22 RX ADMIN — SODIUM CHLORIDE 50 MILLILITER(S): 9 INJECTION, SOLUTION INTRAVENOUS at 21:02

## 2017-11-22 RX ADMIN — Medication 300 MILLIGRAM(S): at 13:56

## 2017-11-22 RX ADMIN — SODIUM CHLORIDE 50 MILLILITER(S): 9 INJECTION, SOLUTION INTRAVENOUS at 16:35

## 2017-11-22 RX ADMIN — SODIUM CHLORIDE 50 MILLILITER(S): 9 INJECTION, SOLUTION INTRAVENOUS at 05:09

## 2017-11-22 NOTE — PROGRESS NOTE ADULT - PROBLEM SELECTOR PLAN 1
likely Acute CVA with aphasia, and left hemiparesis s/p TPA  s/p MICU   Appreciate Neurology consult- Pt with left hemiparesis, right gaze deviation with left field cut, left sided neglect and extinction consistent with a RMCA stroke which is confirmed on repeat  CT head yesterday. This was most likely an embolic event, suspect cardioembolism although not confirmed but pt at increased risk for Afib due to age and dilated left atrium   Neuro recommendations -Gradual normotension as patient now outside of 24hr window.  Aspirin daily and DVT ppx    Atorvastatin 80 mg at bedtime once able to pass the swallow evaluation or stable enteral access is established  SCDs for DVT prophylaxis in the interim  Carotid Ultrasound to evaluate for cerebral vasculature  No indications for performing MRI at this time as it would not likely change the management  TTE negative for LV or Atrial thrombus.  - HbA1c 5.4,   - PT/OT/speech and swallow evaluation likely Acute CVA with aphasia, and left hemiparesis s/p TPA  s/p MICU   Appreciate Neurology consult- Pt with left hemiparesis, right gaze deviation with left field cut, left sided neglect and extinction consistent with a RMCA stroke which is confirmed on repeat  CT head yesterday. This was most likely an embolic event, suspect cardioembolism although not confirmed but pt at increased risk for Afib due to age and dilated left atrium   Neuro recommendations -Gradual normotension as patient now outside of 24hr window.  Aspirin daily and DVT ppx    Atorvastatin 80 mg at bedtime once able to pass the swallow evaluation or stable enteral access is established  SCDs for DVT prophylaxis in the interim  Carotid Ultrasound to evaluate for cerebral vasculature  No indications for performing MRI at this time as it would not likely change the management  TTE negative for LV or Atrial thrombus.  HbA1c 5.4,   DW Neuro -Pt will likely AC for P Afib once pt paases speech and swallow   - PT/OT/speech and swallow evaluation

## 2017-11-22 NOTE — PHYSICAL THERAPY INITIAL EVALUATION ADULT - SITTING BALANCE: DYNAMIC
Sitting balance tested at Edge of bed. Patient requires UE support and mod assistance x 1 to maintain Static/Dynamic sitting balance. Patient benefits from verbal ques to maintain upright erect posture./fair balance

## 2017-11-22 NOTE — DISCHARGE NOTE ADULT - ADDITIONAL INSTRUCTIONS
Please follow up with your primary care provider within 2 weeks call for an appointment   Your rheumatologist in 2 weeks

## 2017-11-22 NOTE — OCCUPATIONAL THERAPY INITIAL EVALUATION ADULT - ADDITIONAL COMMENTS
Pt.'s daughter explains that pt. benefited from supervision to varying levels of assistance with ADL's (UB & LB Dressing, sponge bathing, and toileting) prior to hospitalization. However, pt.'s daughter reports that pt. was independent with eating and grooming tasks prior to hospitalization. Per pt.'s daughter, pt benefited from supervision and rolling walker for household functional mobility.

## 2017-11-22 NOTE — PROGRESS NOTE ADULT - ASSESSMENT
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   Pt received TPA in the ED for suspected R MCA occlusion. Pt currently has left hemiparesis, right gaze deviation with left field cut, left sided neglect and extinction consistent with a RMCA stroke which is confirmed on repeat  CT head yesterday. This was most likely an embolic event, suspect cardioembolism although not confirmed but pt at increased risk for Afib due to age and dilated left atrium.       Plan:     - Gradual normotension as patient now outside of 24hr window.  - Aspirin daily and DVT ppx  - - Atorvastatin 80 mg at bedtime once able to pass the swallow evaluation or stable enteral access is established  - SCDs for DVT prophylaxis in the interim  - Carotid Ultrasound to evaluate for cerebral vasculature  - No indications for performing MRI at this time as it would not likely change the management  - TTE negative for LV or Atrial thrombus.  - HbA1c 5.4,   - PT/OT/speech and swallow evaluation 89F hx of PUD, osteoarthritis, rheumatoid arthritis, prior CVA nov 2016 with residual speech changes,  p/w left sided weakness and speech difficulty.   Pt received TPA in the ED for suspected R MCA occlusion. Pt currently has left hemiparesis, right gaze deviation with left field cut, left sided neglect and extinction consistent with a RMCA stroke which is confirmed on repeat  CT head yesterday. This was most likely an embolic event, suspect cardioembolism although not confirmed but pt at increased risk for Afib due to age and dilated left atrium.       Plan:     - Gradual normotension as patient now outside of 24hr window post TPA  - On review of records from [facility] where patient had last stroke, it is documented that patient went into paroxysmal Afib, therfore patient has diagnosis of Afib. Risk vs benefits of Anticoagulation discussed with her daughter at bedside. If patient passed swallow eval, then will have GI come and evaluate risk of bleeding with AC. Given her [id number] score of 5, pt at high risk for further stroke and she would benefit from anticoagulation.   -  Heparin SQ for DVT prophylaxis in the interim  - Atorvastatin 80 mg at bedtime once able to pass the swallow evaluation or stable enteral access is established  - Carotid Ultrasound to evaluate for cerebral vasculature  - No indications for performing MRI at this time as it would not likely change the management  - TTE negative for LV or Atrial thrombus.  - HbA1c 5.4,   - PT/OT/speech and swallow evaluation 89F hx of PUD, osteoarthritis, rheumatoid arthritis, prior CVA nov 2016 with residual speech changes and history of PAF presented to [facility] with left sided weakness and speech difficulty.   Pt received IV tPA in the ED. She was noted to have some improvement in the neurological deficits after IV tPA. CT brain subsequently showed R MCA distribution embolic looking stroke.     Impression:  Cerebral embolism with cerebral infarction. Right MCA distribution stroke - likely etiology being cardioembolism, probably related to paroxysmal atrial fibrillation and not being on therapeutic anticoagulation at the time of stroke     Plan:   - On review of records from [facility] where patient had last stroke, it is documented that patient went into paroxysmal Afib, therefore patient has diagnosis of Afib. Considering [id number] score=5, she would benefit from therapeutic anticoagulation for secondary stroke prevention once cleared by GI team due to history of GI bleed in the past. Risk vs benefits of anticoagulation discussed with her daughter at bedside in detail. Consider starting therapeutic anticoagulation once able to pass the swallowing evaluation   - Heparin SQ for DVT prophylaxis in the interim  - Atorvastatin 80 mg at bedtime once able to pass the swallow evaluation or stable enteral access is established  - Carotid Ultrasound to evaluate for carotid vasculature  - No indications for performing MRI at this time as it would not likely change the management  - TTE did not show any evidence of significant valvular heart disease nor showed any evidence of structural cardiac source of embolism   - HbA1c 5.4,   - PT/OT/speech and swallow evaluation    Above mentioned plan was discussed with patient and available family members at bedside in detail. All the questions were answered and concerns were addressed. 89F hx of PUD, osteoarthritis, rheumatoid arthritis, prior CVA nov 2016 with residual speech changes and history of PAF presented to [facility] with left sided weakness and speech difficulty.   Pt received IV tPA in the ED. She was noted to have some improvement in the neurological deficits after IV tPA. CT brain subsequently showed R MCA distribution embolic looking stroke.     Impression:  Cerebral embolism with cerebral infarction. Right MCA distribution stroke - likely etiology being cardioembolism, probably related to paroxysmal atrial fibrillation and not being on therapeutic anticoagulation at the time of stroke     Plan:   - On review of records from [facility] where patient had last stroke, it is documented that patient went into paroxysmal Afib, therefore patient has diagnosis of Afib. Considering [id number] score=5, she would benefit from therapeutic anticoagulation for secondary stroke prevention once cleared by GI team due to history of GI bleed in the past. Risk vs benefits of anticoagulation discussed with her daughter at bedside in detail. Consider starting therapeutic anticoagulation once able to pass the swallowing evaluation   - No indications to add aspirin to therapeutic anticoagulation unless she has a history of CAD or cardiac stents   - Heparin SQ for DVT prophylaxis in the interim  - Atorvastatin 80 mg at bedtime once able to pass the swallow evaluation or stable enteral access is established  - Carotid Ultrasound to evaluate for carotid vasculature  - No indications for performing MRI at this time as it would not likely change the management  - TTE did not show any evidence of significant valvular heart disease nor showed any evidence of structural cardiac source of embolism   - HbA1c 5.4,   - PT/OT/speech and swallow evaluation    Above mentioned plan was discussed with patient and available family members at bedside in detail. All the questions were answered and concerns were addressed.

## 2017-11-22 NOTE — PROGRESS NOTE ADULT - SUBJECTIVE AND OBJECTIVE BOX
Neurology Follow up note    Patient is a 89y old  Female who presents with a chief complaint of CVA (20 Nov 2017 19:30)      Subjective:Interval History - No events overnight    Objective:   Vital Signs Last 24 Hrs  T(C): 37.4 (22 Nov 2017 05:08), Max: 38.2 (21 Nov 2017 12:00)  T(F): 99.3 (22 Nov 2017 05:08), Max: 100.7 (21 Nov 2017 12:00)  HR: 60 (22 Nov 2017 05:08) (60 - 79)  BP: 132/72 (22 Nov 2017 05:08) (127/100 - 161/68)  BP(mean): 75 (21 Nov 2017 20:00) (75 - 105)  RR: 18 (22 Nov 2017 05:08) (14 - 22)  SpO2: 98% (22 Nov 2017 05:08) (98% - 100%)    General Exam:   General appearance: No acute distress                   Neurological Exam:  Mental Status: Orientated to self, date and place.  Attention intact. Moderate dysarthria, speech fluent. Follows simple and complex commands. Eye opening apraxia.    Cranial Nerves:  PERRL, right gaze deviation, left homonymous hemianopsia, no nystagmus.     CN V1-3 intact to light touch.  Dense left facial droop.  Hearing intact bilaterally.  Tongue midline.     Motor: Normal tone. RUE 5/5 no drift. LUE antigravity . RLE Able to hold up against gravity, LLE some effort against gravity.              Coord: unable to test due to vision field cut.    Tremor: No resting, postural or action tremor.  No myoclonus.    Sensation: intact to light touch, with extinction on the left and left side visual and spacial neglect.     Gait: deferred    Other:    11-22    138  |  103  |  16  ----------------------------<  70  4.4   |  22  |  0.88    Ca    8.4      22 Nov 2017 05:10  Phos  2.8     11-22  Mg     1.7     11-22    TPro  6.6  /  Alb  3.6  /  TBili  0.7  /  DBili  x   /  AST  31  /  ALT  17  /  AlkPhos  96  11-21 11-22    138  |  103  |  16  ----------------------------<  70  4.4   |  22  |  0.88    Ca    8.4      22 Nov 2017 05:10  Phos  2.8     11-22  Mg     1.7     11-22    TPro  6.6  /  Alb  3.6  /  TBili  0.7  /  DBili  x   /  AST  31  /  ALT  17  /  AlkPhos  96  11-21    LIVER FUNCTIONS - ( 21 Nov 2017 02:00 )  Alb: 3.6 g/dL / Pro: 6.6 g/dL / ALK PHOS: 96 u/L / ALT: 17 u/L / AST: 31 u/L / GGT: x                                 11.8   6.41  )-----------( 140      ( 22 Nov 2017 05:10 )             36.5     Radiology    < from: CT Head No Cont (11.21.17 @ 14:55) >  There is a new right MCA infarct of the anterior and middle right   temporal lobe. There is no intracranial hemorrhage. There is no   significant mass effect on the ventricles or basal ganglia. There is no   hydrocephalus or midline shift.    TTE 11/21: < from: Transthoracic Echocardiogram (11.21.17 @ 15:47) >  1. Mitral annular calcification, otherwise normal mitral  valve. Mild-moderate mitral regurgitation.  2. Calcified trileaflet aortic valve with normal opening.  Mild-moderate aortic regurgitation.  3. Mildly dilated leftatrium.  LA volume index = 40 cc/m2.  4. Moderate concentric left ventricular hypertrophy.  5. Mild global left ventricular systolic dysfunction.  6. Normal right ventricular size and function.        MEDICATIONS  (STANDING):  aspirin Suppository 300 milliGRAM(s) Rectal daily  heparin  Injectable 5000 Unit(s) SubCutaneous every 8 hours  lactated ringers. 1000 milliLiter(s) (50 mL/Hr) IV Continuous <Continuous>  pantoprazole  Injectable 40 milliGRAM(s) IV Push daily    MEDICATIONS  (PRN):  acetaminophen   Tablet 650 milliGRAM(s) Oral every 6 hours PRN For Temp greater than 38 C (100.4 F)  acetaminophen   Tablet. 650 milliGRAM(s) Oral every 6 hours PRN Mild Pain (1 - 3) Neurology Follow up note    Patient is a 89y old  Female who presents with a chief complaint of CVA (20 Nov 2017 19:30)      Subjective:Interval History - No events overnight    Objective:   Vital Signs Last 24 Hrs  T(C): 37.4 (22 Nov 2017 05:08), Max: 38.2 (21 Nov 2017 12:00)  T(F): 99.3 (22 Nov 2017 05:08), Max: 100.7 (21 Nov 2017 12:00)  HR: 60 (22 Nov 2017 05:08) (60 - 79)  BP: 132/72 (22 Nov 2017 05:08) (127/100 - 161/68)  BP(mean): 75 (21 Nov 2017 20:00) (75 - 105)  RR: 18 (22 Nov 2017 05:08) (14 - 22)  SpO2: 98% (22 Nov 2017 05:08) (98% - 100%)    General Exam:   General appearance: No acute distress                   Neurological Exam:  Mental Status: Orientated to self, date and place.  Attention intact. Moderate dysarthria, speech fluent. Follows simple and complex commands. Eye opening apraxia.    Cranial Nerves:  PERRL, right gaze deviation, possible left homonymous hemianopsia, no nystagmus.     CN V1-3 intact to light touch.  Dense left facial droop.  Hearing intact bilaterally.  Tongue midline.     Motor: Normal tone. RUE 5/5 no drift. LUE antigravity 0/5  . RLE able to hold up against gravity 5-/5, LLE 5-/5             Coord: unable to test due to vision field cut.    Tremor: No resting, postural or action tremor.  No myoclonus.    Sensation: intact to light touch, with extinction on the left and left side visual and spacial neglect.     Gait: deferred    Other:    11-22    138  |  103  |  16  ----------------------------<  70  4.4   |  22  |  0.88    Ca    8.4      22 Nov 2017 05:10  Phos  2.8     11-22  Mg     1.7     11-22    TPro  6.6  /  Alb  3.6  /  TBili  0.7  /  DBili  x   /  AST  31  /  ALT  17  /  AlkPhos  96  11-21 11-22    138  |  103  |  16  ----------------------------<  70  4.4   |  22  |  0.88    Ca    8.4      22 Nov 2017 05:10  Phos  2.8     11-22  Mg     1.7     11-22    TPro  6.6  /  Alb  3.6  /  TBili  0.7  /  DBili  x   /  AST  31  /  ALT  17  /  AlkPhos  96  11-21    LIVER FUNCTIONS - ( 21 Nov 2017 02:00 )  Alb: 3.6 g/dL / Pro: 6.6 g/dL / ALK PHOS: 96 u/L / ALT: 17 u/L / AST: 31 u/L / GGT: x                                 11.8   6.41  )-----------( 140      ( 22 Nov 2017 05:10 )             36.5     Radiology    < from: CT Head No Cont (11.21.17 @ 14:55) >  There is a new right MCA infarct of the anterior and middle right   temporal lobe. There is no intracranial hemorrhage. There is no   significant mass effect on the ventricles or basal ganglia. There is no   hydrocephalus or midline shift.    TTE 11/21: < from: Transthoracic Echocardiogram (11.21.17 @ 15:47) >  1. Mitral annular calcification, otherwise normal mitral  valve. Mild-moderate mitral regurgitation.  2. Calcified trileaflet aortic valve with normal opening.  Mild-moderate aortic regurgitation.  3. Mildly dilated leftatrium.  LA volume index = 40 cc/m2.  4. Moderate concentric left ventricular hypertrophy.  5. Mild global left ventricular systolic dysfunction.  6. Normal right ventricular size and function.        MEDICATIONS  (STANDING):  aspirin Suppository 300 milliGRAM(s) Rectal daily  heparin  Injectable 5000 Unit(s) SubCutaneous every 8 hours  lactated ringers. 1000 milliLiter(s) (50 mL/Hr) IV Continuous <Continuous>  pantoprazole  Injectable 40 milliGRAM(s) IV Push daily    MEDICATIONS  (PRN):  acetaminophen   Tablet 650 milliGRAM(s) Oral every 6 hours PRN For Temp greater than 38 C (100.4 F)  acetaminophen   Tablet. 650 milliGRAM(s) Oral every 6 hours PRN Mild Pain (1 - 3) Neurology Follow up note    Patient is a 89y old  Female who presents with a chief complaint of CVA (20 Nov 2017 19:30)    Subjective: Interval History - No events overnight    Objective:   Vital Signs Last 24 Hrs  T(C): 37.4 (22 Nov 2017 05:08), Max: 38.2 (21 Nov 2017 12:00)  T(F): 99.3 (22 Nov 2017 05:08), Max: 100.7 (21 Nov 2017 12:00)  HR: 60 (22 Nov 2017 05:08) (60 - 79)  BP: 132/72 (22 Nov 2017 05:08) (127/100 - 161/68)  BP(mean): 75 (21 Nov 2017 20:00) (75 - 105)  RR: 18 (22 Nov 2017 05:08) (14 - 22)  SpO2: 98% (22 Nov 2017 05:08) (98% - 100%)    General Exam:   General appearance: No acute distress                   Neurological Exam:  Mental Status: Orientated to self, date and place.  Attention intact. Moderate dysarthria, speech fluent. Follows simple and complex commands. Eye opening apraxia.    Cranial Nerves:  PERRL, right gaze deviation, possible left homonymous hemianopsia, no nystagmus.     CN V1-3 intact to light touch.  Dense left facial droop.  Hearing intact bilaterally.  Tongue midline.     Motor: Normal tone. RUE 5/5 no drift. LUE antigravity 0/5  . RLE able to hold up against gravity 5-/5, LLE 5-/5             Coord: unable to test due to vision field cut.    Tremor: No resting, postural or action tremor.  No myoclonus.    Sensation: intact to light touch, with extinction on the left and left side visual and spacial neglect.     Gait: deferred    Other:    11-22    138  |  103  |  16  ----------------------------<  70  4.4   |  22  |  0.88    Ca    8.4      22 Nov 2017 05:10  Phos  2.8     11-22  Mg     1.7     11-22    TPro  6.6  /  Alb  3.6  /  TBili  0.7  /  DBili  x   /  AST  31  /  ALT  17  /  AlkPhos  96  11-21 11-22    138  |  103  |  16  ----------------------------<  70  4.4   |  22  |  0.88    Ca    8.4      22 Nov 2017 05:10  Phos  2.8     11-22  Mg     1.7     11-22    TPro  6.6  /  Alb  3.6  /  TBili  0.7  /  DBili  x   /  AST  31  /  ALT  17  /  AlkPhos  96  11-21    LIVER FUNCTIONS - ( 21 Nov 2017 02:00 )  Alb: 3.6 g/dL / Pro: 6.6 g/dL / ALK PHOS: 96 u/L / ALT: 17 u/L / AST: 31 u/L / GGT: x                                 11.8   6.41  )-----------( 140      ( 22 Nov 2017 05:10 )             36.5     Radiology    < from: CT Head No Cont (11.21.17 @ 14:55) >  There is a new right MCA infarct of the anterior and middle right   temporal lobe. There is no intracranial hemorrhage. There is no   significant mass effect on the ventricles or basal ganglia. There is no   hydrocephalus or midline shift.    TTE 11/21: < from: Transthoracic Echocardiogram (11.21.17 @ 15:47) >  1. Mitral annular calcification, otherwise normal mitral  valve. Mild-moderate mitral regurgitation.  2. Calcified trileaflet aortic valve with normal opening.  Mild-moderate aortic regurgitation.  3. Mildly dilated leftatrium.  LA volume index = 40 cc/m2.  4. Moderate concentric left ventricular hypertrophy.  5. Mild global left ventricular systolic dysfunction.  6. Normal right ventricular size and function.        MEDICATIONS  (STANDING):  aspirin Suppository 300 milliGRAM(s) Rectal daily  heparin  Injectable 5000 Unit(s) SubCutaneous every 8 hours  lactated ringers. 1000 milliLiter(s) (50 mL/Hr) IV Continuous <Continuous>  pantoprazole  Injectable 40 milliGRAM(s) IV Push daily    MEDICATIONS  (PRN):  acetaminophen   Tablet 650 milliGRAM(s) Oral every 6 hours PRN For Temp greater than 38 C (100.4 F)  acetaminophen   Tablet. 650 milliGRAM(s) Oral every 6 hours PRN Mild Pain (1 - 3)

## 2017-11-22 NOTE — CONSULT NOTE ADULT - ASSESSMENT
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 The patient is with left hemiparesis, right gaze deviation with left field cut, left sided neglect. Neurology is following and suspects a possible embolic event given patients history of AFib. The patient has a strong h/o GI bleeding requiring intubation and icu stay in 12/2016 after starting ASA 325mg and since has been off anticoagulation

## 2017-11-22 NOTE — PHYSICAL THERAPY INITIAL EVALUATION ADULT - CRITERIA FOR SKILLED THERAPEUTIC INTERVENTIONS
impairments found/functional limitations in following categories/rehab potential/anticipated D/C to rehab facility/anticipated discharge recommendation

## 2017-11-22 NOTE — DISCHARGE NOTE ADULT - CARE PLAN
Principal Discharge DX:	CVA (cerebral vascular accident)  Instructions for follow-up, activity and diet:	continue coumadin as directed. PT/OT at rehab. Follow up with neurology  Secondary Diagnosis:	Paroxysmal atrial fibrillation  Instructions for follow-up, activity and diet:	continue coumadin as directed, check your INR in 2-3 days.  Secondary Diagnosis:	PUD (peptic ulcer disease)  Instructions for follow-up, activity and diet:	follow up with GI outpatient, EGD defer at this time given high risk  Secondary Diagnosis:	Rheumatoid arthritis, adult  Secondary Diagnosis:	Hypernatremia  Instructions for follow-up, activity and diet:	monitor your electrolyte.  Secondary Diagnosis:	Fever in other diseases  Instructions for follow-up, activity and diet:	your have completed antibiotics for PNA and UTI. follow up with your PMD in 2-3 weeks Principal Discharge DX:	CVA (cerebral vascular accident)  Goal:	continue current care  Instructions for follow-up, activity and diet:	continue coumadin as directed. PT/OT at rehab. Follow up with neurology  Secondary Diagnosis:	Paroxysmal atrial fibrillation  Instructions for follow-up, activity and diet:	continue coumadin as directed, check your INR in 2-3 days.  Secondary Diagnosis:	PUD (peptic ulcer disease)  Instructions for follow-up, activity and diet:	follow up with GI outpatient, EGD defer at this time given high risk  Secondary Diagnosis:	Rheumatoid arthritis, adult  Secondary Diagnosis:	Hypernatremia  Instructions for follow-up, activity and diet:	monitor your electrolyte.  Secondary Diagnosis:	Fever in other diseases  Instructions for follow-up, activity and diet:	your have completed antibiotics for PNA and UTI. follow up with your PMD in 2-3 weeks

## 2017-11-22 NOTE — PROGRESS NOTE ADULT - ASSESSMENT
89F with hx of CVA, OA, RA, PUD, p/w L sided weakness. She received tpa in the ED and soon after, the IV line infiltrated.    Neuro:  - tpa infiltrated into arm. subsequently dc'd.   - Repeat CT scan at 24 hours and f/u MRI  - Order cardotid duplex  - Order echo  - speech and swallow evaluation  - transfer to neuro unit    CV:  - Echo    RESP:  - Patient noted to have dry, intermittent cough  - CXR to r/o pneumonia    GI:  NPO for now. Will start on PO diet after successful speech and swallow eval    ID:  Patient developed fever to 100.7 today. Will order tylenol suppository.  Will obtain UA to r/o uti    Endocrine:  No history of diabetes. A1c 5. Will begin on normal diet after speech and swallow eval. 89F with hx of CVA, OA, RA, PUD,  previous GIB,. aw p/w L sided weakness. She received tpa in the ED and soon after, the IV line infiltrated.

## 2017-11-22 NOTE — SWALLOW BEDSIDE ASSESSMENT ADULT - ASR SWALLOW ASPIRATION MONITOR
change of breathing pattern/oral hygiene/gurgly voice/position upright (90Y)/fever/throat clearing/cough/pneumonia/upper respiratory infection

## 2017-11-22 NOTE — CHART NOTE - NSCHARTNOTEFT_GEN_A_CORE
89F presented with left upper extremity with right sided gaze. Patient had questionable amount of TPA given, stable in the ICU. Repeat CT confirmed ischemic infarct. Patient signed out to tele PA service in the ICU.    Saw patient later on in the morning to see how pt was doing after ICU transport  Pt. appears tired, unwilling to give effort to answer questions. Has some dysarthria and has RUE 3/5 strength. LUE 0/5; not cooperative with exam.    Spoke to neurology resident 845AM alerted of patient's status  Pt likely exhausted from ICU stay although unclear if this is progression of neurological event  Neuro will evaluate patient and discuss with primary team as soon as pt is evaluated    Daughter by bedside  Discussed with her the plan  Spoke to tele PA Alexys    [id number]  Abad Wang 89F presented with left upper extremity with right sided gaze. Patient had questionable amount of TPA given, stable in the ICU. Repeat CT confirmed ischemic infarct. Patient was signed out to tele PA service in the ICU.    Saw patient later on in the morning to see how pt was doing after ICU transport  Pt. appears tired, unwilling to give effort to answer questions. Has some dysarthria and has RUE 3/5 strength. LUE 0/5; not cooperative with exam.    Spoke to neurology resident 845AM alerted of patient's status  Pt likely exhausted from ICU stay although unclear if this is progression of neurological event  Neuro will evaluate patient and discuss with primary team as soon as pt is evaluated    Daughter by bedside  Discussed with her the plan  Spoke to tele PA Alexys    [id number]  Abad Wang 89F presented with left upper extremity with right sided gaze. Patient had questionable amount of TPA given, stable in the ICU. Repeat CT confirmed ischemic infarct. Patient was signed out to tele PA service in the ICU.    Saw patient later on in the morning to see how pt was doing after ICU transport  Pt. appears tired, unwilling to give effort to answer questions. Has some dysarthria and has RUE 3/5 strength. LUE 0/5; not cooperative with exam.    Spoke to neurology resident 845AM alerted of patient's status  Pt likely exhausted from ICU stay although unclear if this is progression of neurological event  Neuro will evaluate patient and discuss with primary team as soon as pt is evaluated  ? May need r/p CT    Daughter by bedside  Discussed with her the plan  Spoke to tele DAQUAN Reardon about plan as well    [id number]  Abad Wang

## 2017-11-22 NOTE — DISCHARGE NOTE ADULT - SECONDARY DIAGNOSIS.
Paroxysmal atrial fibrillation PUD (peptic ulcer disease) Rheumatoid arthritis, adult Hypernatremia Fever in other diseases

## 2017-11-22 NOTE — DISCHARGE NOTE ADULT - PATIENT PORTAL LINK FT
“You can access the [facility] Patient Portal, offered by [facility], by registering with the following website: [email or web address]”

## 2017-11-22 NOTE — CONSULT NOTE ADULT - PROBLEM SELECTOR RECOMMENDATION 9
- CVA- Neuro f/u s/p TPA   Dysphagia- alternative means of nutrition, S&S follow up, consider palliative consult to help with PEG decision making   Turn and position q2h   Dispo- RASHMI for now

## 2017-11-22 NOTE — ED PROCEDURE NOTE - GENERAL PROCEDURE DETAILS
Identified L basilic vein using US. Placed long 20 guage IV under direct visualization and saw catheter enter vein. IV flushed easily with good blood return .

## 2017-11-22 NOTE — SWALLOW BEDSIDE ASSESSMENT ADULT - SWALLOW EVAL: DIAGNOSIS
The patient demonstrates a Mild Oral Dysphagia characterized by delayed bolus collection, manipulation and transport, suspected delay in pharyngeal trigger, and reduced hyolaryngeal excursion upon palpation, with increased (weak) throat clearing post swallow for limited trials of puree and honey-thick liquid via teaspoon, followed by a delayed wet/weak cough post completion of trials suggestive of laryngeal penetration vs aspiration. Patient would likely benefit from increased time to improve overall swallow function given acute neuro event. The patient demonstrates oropharyngeal dysphagia characterized by delayed bolus collection, manipulation and transport, suspected delay in pharyngeal trigger, and reduced hyolaryngeal excursion upon palpation, with inconsistent weak throat clearing post swallow for limited trials of puree and honey-thick liquids via teaspoon, followed by a delayed wet/weak cough post completion of trials, suggestive of laryngeal penetration vs aspiration. Patient would benefit from objective testing to assess the integrity of the swallow mechanism and r/o aspiration given above clinical presentation and baseline throat cleraing.

## 2017-11-22 NOTE — DISCHARGE NOTE ADULT - PLAN OF CARE
continue coumadin as directed. PT/OT at rehab. Follow up with neurology continue coumadin as directed, check your INR in 2-3 days. follow up with GI outpatient, EGD defer at this time given high risk monitor your electrolyte. your have completed antibiotics for PNA and UTI. follow up with your PMD in 2-3 weeks continue current care

## 2017-11-22 NOTE — OCCUPATIONAL THERAPY INITIAL EVALUATION ADULT - LUE MMT, REHAB EVAL
Left UE: Shoulder Flexion Grossly 0/5, Elbow Flexion/Extension Grossly 1/5, Wrist Flexion/Extension Grossly 0/5, Hand Flexion/Extension Grossly 0/5

## 2017-11-22 NOTE — PHYSICAL THERAPY INITIAL EVALUATION ADULT - ADDITIONAL COMMENTS
Pt. lives in a pvt home with their daughter and has a HHA come when daughter is at work. Pt. has 4 steps with HR x 1 to enter their home and has a stair lift to reach bedroom/bathroom. Pt. was previously ambulating household distances with a rolling walker with close supervision/contact guard assistance and using a W/C for community ambulation. pt. previously required assistance with most ADLs however, they were able to groom independently. Pt. returned to bed at end of therapy session with all lines and tubes intact, call bell in reach and in NAD with RN team in room bedside.

## 2017-11-22 NOTE — CONSULT NOTE ADULT - SUBJECTIVE AND OBJECTIVE BOX
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 Patient lives at home with daughter but has a home health aid that assists with ADLs. Patient went to bathroom at 11:30 and soon after cleaning up, was suddenly unable to lift her left leg/arm. Home health aid also noticed a left sided facial droop and that the patient was unable to speak, although was alert with her eyes open and bobbing her head.  At her baseline, she is able to ambulate with a walker, bathe, eat, and perform other ADLs with assistance from the home health aid.  Daughter states that patient was admitted to the ICU 1 year ago for a CVA and was put on blood thinners that resulted in her having a massive GI bleed. Blood thinners were discontinued at that time and patient has not been on anticoagulation since. s/p TPA in ED. CT Brain 11/19/17 showed new right MCA infarct, no intracranial hemorrhage. Failed swallow eval, currently NPO.         REVIEW OF SYSTEMS: No chest pain, shortness of breath, nausea, vomiting or diarhea.      PAST MEDICAL & SURGICAL HISTORY  Depression  Osteoarthritis  TIA (transient ischemic attack)  S/P knee replacement  History of cholecystectomy  Rheumatoid arthritis, adult  S/P appendectomy  Status post total knee replacement  History of cholecystectomy      SOCIAL HISTORY  Smoking - Denied, EtOH - Denied, Drugs - Denied    FUNCTIONAL HISTORY:   Lives with family in a house with stairs.   Household ambulator with rollator. HHA during the day.     CURRENT FUNCTIONAL STATUS: mod a       FAMILY HISTORY   No pertinent family history in first degree relatives      RECENT LABS/IMAGING  CBC Full  -  ( 22 Nov 2017 05:10 )  WBC Count : 6.41 K/uL  Hemoglobin : 11.8 g/dL  Hematocrit : 36.5 %  Platelet Count - Automated : 140 K/uL  Mean Cell Volume : 97.1 fL  Mean Cell Hemoglobin : 31.4 pg  Mean Cell Hemoglobin Concentration : 32.3 %  Auto Neutrophil # : 4.27 K/uL  Auto Lymphocyte # : 1.31 K/uL  Auto Monocyte # : 0.65 K/uL  Auto Eosinophil # : 0.10 K/uL  Auto Basophil # : 0.03 K/uL  Auto Neutrophil % : 66.6 %  Auto Lymphocyte % : 20.4 %  Auto Monocyte % : 10.1 %  Auto Eosinophil % : 1.6 %  Auto Basophil % : 0.5 %    11-22    138  |  103  |  16  ----------------------------<  70  4.4   |  22  |  0.88    Ca    8.4      22 Nov 2017 05:10  Phos  2.8     11-22  Mg     1.7     11-22    TPro  6.6  /  Alb  3.6  /  TBili  0.7  /  DBili  x   /  AST  31  /  ALT  17  /  AlkPhos  96  11-21        VITALS  T(C): 37 (11-22-17 @ 14:30), Max: 37.7 (11-21-17 @ 20:00)  HR: 58 (11-22-17 @ 14:30) (58 - 77)  BP: 146/53 (11-22-17 @ 14:30) (132/72 - 161/68)  RR: 17 (11-22-17 @ 14:30) (14 - 21)  SpO2: 100% (11-22-17 @ 14:30) (98% - 100%)  Wt(kg): --    ALLERGIES  ACE inhibitors (Angioedema)  caffeine (Stomach Upset)  lactose (Other)  morphine (Unknown)      MEDICATIONS   acetaminophen   Tablet 650 milliGRAM(s) Oral every 6 hours PRN  acetaminophen   Tablet. 650 milliGRAM(s) Oral every 6 hours PRN  aspirin Suppository 300 milliGRAM(s) Rectal daily  dextrose 5% + sodium chloride 0.9%. 1000 milliLiter(s) IV Continuous <Continuous>  heparin  Injectable 5000 Unit(s) SubCutaneous every 8 hours  pantoprazole  Injectable 40 milliGRAM(s) IV Push daily      ----------------------------------------------------------------------------------------  PHYSICAL EXAM  Constitutional - NAD, Comfortable  HEENT - NCAT, EOMI  Neck - Supple, No limited ROM  Chest - CTA bilaterally, No wheeze, No rhonchi, No crackles  Cardiovascular - RRR, S1S2, No murmurs  Abdomen - BS+, Soft, NTND  Extremities - No C/C/E, No calf tenderness   Neurologic Exam -                    Cognitive - Awake, Alert, AAO to self, place, date, year, situation     Communication - Fluent, + dysarthria, no aphasia     Cranial Nerves - CN 2-12 intact. 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 Patient lives at home with daughter but has a home health aid that assists with ADLs. Patient went to bathroom at 11:30 and soon after cleaning up, was suddenly unable to lift her left leg/arm. Home health aid also noticed a left sided facial droop and that the patient was unable to speak, although was alert with her eyes open and bobbing her head.  At her baseline, she is able to ambulate with a walker, bathe, eat, and perform other ADLs with assistance from the home health aid.  Daughter states that patient was admitted to the ICU 1 year ago for a CVA and was put on blood thinners that resulted in her having a massive GI bleed. Blood thinners were discontinued at that time and patient has not been on anticoagulation since. s/p TPA in ED. CT Brain 11/19/17 showed new right MCA infarct, no intracranial hemorrhage. Failed swallow eval, currently NPO.         REVIEW OF SYSTEMS: No chest pain, shortness of breath, nausea, vomiting or diarhea.      PAST MEDICAL & SURGICAL HISTORY  Depression  Osteoarthritis  TIA (transient ischemic attack)  S/P knee replacement  History of cholecystectomy  Rheumatoid arthritis, adult  S/P appendectomy  Status post total knee replacement  History of cholecystectomy      SOCIAL HISTORY  Smoking - Denied, EtOH - Denied, Drugs - Denied    FUNCTIONAL HISTORY:   Lives with family in a house with stairs.   Household ambulator with rollator. HHA during the day.     CURRENT FUNCTIONAL STATUS:max a       FAMILY HISTORY   No pertinent family history in first degree relatives      RECENT LABS/IMAGING  CBC Full  -  ( 22 Nov 2017 05:10 )  WBC Count : 6.41 K/uL  Hemoglobin : 11.8 g/dL  Hematocrit : 36.5 %  Platelet Count - Automated : 140 K/uL  Mean Cell Volume : 97.1 fL  Mean Cell Hemoglobin : 31.4 pg  Mean Cell Hemoglobin Concentration : 32.3 %  Auto Neutrophil # : 4.27 K/uL  Auto Lymphocyte # : 1.31 K/uL  Auto Monocyte # : 0.65 K/uL  Auto Eosinophil # : 0.10 K/uL  Auto Basophil # : 0.03 K/uL  Auto Neutrophil % : 66.6 %  Auto Lymphocyte % : 20.4 %  Auto Monocyte % : 10.1 %  Auto Eosinophil % : 1.6 %  Auto Basophil % : 0.5 %    11-22    138  |  103  |  16  ----------------------------<  70  4.4   |  22  |  0.88    Ca    8.4      22 Nov 2017 05:10  Phos  2.8     11-22  Mg     1.7     11-22    TPro  6.6  /  Alb  3.6  /  TBili  0.7  /  DBili  x   /  AST  31  /  ALT  17  /  AlkPhos  96  11-21        VITALS  T(C): 37 (11-22-17 @ 14:30), Max: 37.7 (11-21-17 @ 20:00)  HR: 58 (11-22-17 @ 14:30) (58 - 77)  BP: 146/53 (11-22-17 @ 14:30) (132/72 - 161/68)  RR: 17 (11-22-17 @ 14:30) (14 - 21)  SpO2: 100% (11-22-17 @ 14:30) (98% - 100%)  Wt(kg): --    ALLERGIES  ACE inhibitors (Angioedema)  caffeine (Stomach Upset)  lactose (Other)  morphine (Unknown)      MEDICATIONS   acetaminophen   Tablet 650 milliGRAM(s) Oral every 6 hours PRN  acetaminophen   Tablet. 650 milliGRAM(s) Oral every 6 hours PRN  aspirin Suppository 300 milliGRAM(s) Rectal daily  dextrose 5% + sodium chloride 0.9%. 1000 milliLiter(s) IV Continuous <Continuous>  heparin  Injectable 5000 Unit(s) SubCutaneous every 8 hours  pantoprazole  Injectable 40 milliGRAM(s) IV Push daily      ----------------------------------------------------------------------------------------  PHYSICAL EXAM  Constitutional - NAD, Comfortable  HEENT - NCAT, EOMI  Neck - Supple, No limited ROM  Chest - CTA bilaterally, No wheeze, No rhonchi, No crackles  Cardiovascular - RRR, S1S2, No murmurs  Abdomen - BS+, Soft, NTND  Extremities - No C/C/E, No calf tenderness   Neurologic Exam -                    Cognitive - Awake, Alert, AAO to self,   Communication - Fluent, + dysarthria, no aphasia     Cranial Nerves - CN 2-12 intact. Left neglect      Motor - LUE 0/5, LLE 2/5   RUE/RLE 5-/5        Sensory - Intact to LT     Reflexes - DTR Intact, No primitive reflexive     Balance -not tested   Psychiatric - Mood stable, Affect WNL

## 2017-11-22 NOTE — DISCHARGE NOTE ADULT - OTHER SIGNIFICANT FINDINGS
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 Patient lives at home with daughter but has a home health aid that assists with ADLs. Patient went to bathroom at 11:30 and soon after cleaning up, was suddenly unable to lift her left leg/arm. Home health aid also noticed a left sided facial droop and that the patient was unable to speak, although was alert with her eyes open and bobbing her head. The home health aid immediately called daughter for help and patient was brought into ED. At her baseline, she is able to ambulate with a walker, bathe, eat, and perform other ADLs with assistance from the home health aid. Daughter reports that she has been otherwise well recently and has no complaints of recent illness, cough, fever, nausea, vomiting, diarrhea, weakness, no other complaints. Daughter states that patient was admitted to the ICU 1 year ago for a CVA and was put on blood thinners that resulted in her having a massive GI bleed. Blood thinners were discontinued at that time and patient has not been on anticoagulation since. Patient was attending physical therapy from Dec 2016 to February 2017. Daughter also endorses that her mother also had a transient episode of AFib during her last admission but has not been treated for it.     Hospital course:  CT Head- New right MCA infarct. No intracranial hemorrhage.   Echo- EF 50%  Mitral annular calcification, otherwise normal mitral valve. Mild-moderate mitral regurgitation.  Calcified trileaflet aortic valve with normal opening.  Mild-moderate aortic regurgitation.  Mildly dilated left atrium.  LA volume index = 40 cc/m2.  Moderate concentric left ventricular hypertrophy.  Mild global left ventricular systolic dysfunction.  Normal right ventricular size and function. Estimated right ventricular systolic pressure equals 39 mm Hg, assuming right atrial pressure equals 10 mm Hg, consistent with borderline pulmonary hypertension.  CXR No ET Tube visualized  11/23: CD: Minimal heterogenous plaque noted within the proximal right and left internal carotid arteries, c/w 1-15% stenoses.  No hemodynamically significant stenoses noted.  11/24 Cine - puree with honey liquid  11/27 CXR: Bibasilar and retrocardiac opacity, greater on the left, suggesting small   pleural effusions with likely associated passive atelectasis. Underlying  atelectasis of other cause and/or pneumonia is not excluded.  + UA, with Klebsiella   Blood Cx negative    Pt noted with right MCA infacrt s/p tpa and monitored at MICU. Pt transferred on the floor, s/p speech and swallow eval and cinesophagram started on dysphagia diet. Neurology actively following and recommended MRI and CTA head, MRI head noted acute MCA infarct with no hemorrhagic conversion. TTE noted MR and AI with mild global LV dysfunction. Pt with hx of afib not on AC, GI consulted for AC. Pt with hx of PUD and GI bleed with moderate risk if started on AC, pt initially planned for vEGD but unable to swallow capsule. Risk of EGD outweights the benefit therefore EGD deferred at this time as it will not be able to assess gastric pathology. EP initially consulted for ILR however pt noted with afib on telemetry no need for ILR implant. Pt with CHADS 5, after discussion with family and pt pt started on eliquis but due to elevated copay pt transitioned to coumadin. Hospital course also noted for fever likely PNA vs UTI pt started on IV zosyn and transitioned to PO augmentin. Pt completed abx treatment on 12/4. Pt also noted with hypernatremia likely in setting of poor po, pt give IV D5 with improvement *****. PT recommended rehab. Pt cleared for discharge.

## 2017-11-22 NOTE — PHYSICAL THERAPY INITIAL EVALUATION ADULT - PLANNED THERAPY INTERVENTIONS, PT EVAL
neuromuscular re-education/strengthening/transfer training/stair negotiation./balance training/bed mobility training/gait training

## 2017-11-22 NOTE — OCCUPATIONAL THERAPY INITIAL EVALUATION ADULT - RUE MMT, REHAB EVAL
Shoulder Flexion Grossly 2+/5, Elbow Flexion/Extension Grossly 3-/5, Wrist Flexion/Extension Grossly 3-/5, Hand Flexion/Extension Grossly 3-/5

## 2017-11-22 NOTE — DISCHARGE NOTE ADULT - NS AS DC FOLLOWUP STROKE INST
Stroke (includes: TIA/SAH/ICH/Ischemic Stroke) Coumadin/Warfarin/Stroke (includes: TIA/SAH/ICH/Ischemic Stroke) Coumadin/Warfarin/Stroke (includes: TIA/SAH/ICH/Ischemic Stroke)/Smoking Cessation

## 2017-11-22 NOTE — DISCHARGE NOTE ADULT - NS AS DC STROKE ED MATERIALS
Prescribed Medications/Need for Followup After Discharge/Risk Factors for Stroke/Stroke Education Booklet/Stroke Warning Signs and Symptoms/Call 911 for Stroke

## 2017-11-22 NOTE — ADVANCED PRACTICE NURSE CONSULT - REASON FOR CONSULT
Patient seen on skin care rounds after wound care referral received for assessment of skin impairment and recommendations of topical management. Chart reviewed: Serum albumin 3.6g/dL, Total Protein 6.6g/dL, HgbA1C 5.4%,  [place] 13, patient and daughter, May, at bedside interviewed. 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 Patient lives at home with daughter but has a home health aid that assists with ADLs. Patient went to bathroom at 11:30 and soon after cleaning up, was suddenly unable to lift her left leg/arm. Patient admitted for r/o CVA. Patient followed by Medicine team and Neurology. As per patient's daughter, pt seen by PCP on 11/6 with evaluation of her back side. PCP referred Wound care nurse for home visits, pt was seen 4 times in the home. Daughter unable to recall topical recommendations, believes it was Collagenase but uncertain. Was also told to use Desitin as patient wear depends and is able to report when she has passed urine or stool but frequently is unable to make it to the bathroom on time. Patient ambulates with walker and one person assist.

## 2017-11-22 NOTE — DISCHARGE NOTE ADULT - COMMUNITY RESOURCES
[address] [address] ([phone])  5:00pm  via [facility] ([phone]) trip# [id number]

## 2017-11-22 NOTE — SWALLOW BEDSIDE ASSESSMENT ADULT - COMMENTS
The patient was received drowsy yet responsive to verbal/tactile stimuli to awake/alert state. Patient's daughter at bedside. Patient reported lower back pain 10/10 on pain scale, however patient tolerated HOB elevation to 90 degrees and requesting to eat/drink; RN (Dalia) made aware. Reduced articulatory precision and hoarse/harsh vocal quality perceived during limited speech output. Intermittent, weak throat clearing was observed at baseline suggestive of difficulty with secretion management. Comfortable breathing pattern was observed on room air. The patient was received drowsy yet responsive to verbal/tactile stimuli to awake/alert state. Patient's daughter at bedside. Patient reported lower back pain 10/10 on pain scale, however patient tolerated HOB elevation to 90 degrees and requesting to eat/drink; RN (Dalia) made aware. Reduced articulatory precision and hoarse/harsh vocal quality perceived during limited speech output, which has reportedly worsened from patient's baseline as per daughter. Intermittent, weak throat clearing was observed prior to initiation of PO trials suggestive of difficulty with secretion management. Per charting, the patient is a 89 y/o female who presented with L sided weakness; s/p TPA in ED and soon after the IV line infiltrated, subsequently d/c'd. CT Brain 11/19/17 showed new right MCA infarct, no intracranial hemorrhage. 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    The patient was received drowsy yet responsive to verbal/tactile stimuli to awake/alert state. Patient's daughter at bedside. Patient reported lower back pain 10/10 on pain scale, however patient tolerated HOB elevation to 90 degrees and requesting to eat/drink; RN (Dalia) made aware. Reduced articulatory precision and hoarse/harsh vocal quality perceived during limited speech output, which has reportedly worsened from patient's baseline as per daughter. Intermittent, weak throat clearing was observed prior to initiation of PO trials suggestive of difficulty with secretion management.

## 2017-11-22 NOTE — DISCHARGE NOTE ADULT - MEDICATION SUMMARY - MEDICATIONS TO TAKE
I will START or STAY ON the medications listed below when I get home from the hospital:    Coumadin 2 mg oral tablet  -- 2 tab(s) by mouth every other day   -- Do not take this drug if you are pregnant.  It is very important that you take or use this exactly as directed.  Do not skip doses or discontinue unless directed by your doctor.  Obtain medical advice before taking any non-prescription drugs as some may affect the action of this medication.    -- Indication: For Atrial fibrilation    Coumadin 3 mg oral tablet  -- 1 tab(s) by mouth every other day   -- Do not take this drug if you are pregnant.  It is very important that you take or use this exactly as directed.  Do not skip doses or discontinue unless directed by your doctor.  Obtain medical advice before taking any non-prescription drugs as some may affect the action of this medication.    -- Indication: For Atrial fibrialtion     metoprolol tartrate 50 mg oral tablet  -- 1 tab(s) by mouth 2 times a day  -- Indication: For HTN    sucralfate 1 g oral tablet  -- 1 tab(s) by mouth 4 times a day  -- Indication: For GERD    ocular lubricant ophthalmic solution  -- 1 drop(s) to each affected eye 2 times a day  -- Indication: For Dry eyes    lactobacillus acidophilus oral capsule  -- 1  by mouth 2 times a day  -- Indication: For Probiotic    pantoprazole 40 mg oral delayed release tablet  --  by mouth once a day  -- Indication: For GERD    Multiple Vitamins oral tablet  -- 1 tab(s) by mouth once a day  -- Indication: For Supplement

## 2017-11-22 NOTE — CONSULT NOTE ADULT - PROBLEM SELECTOR RECOMMENDATION 3
- unable to fully participate in speech and swallow   - speech recommending NPO, ENT eval and Cinesophagram   - may consider NGT feeds if family agreeable   - consider palliative eval to discuss GOC with family

## 2017-11-22 NOTE — OCCUPATIONAL THERAPY INITIAL EVALUATION ADULT - PERTINENT HX OF CURRENT PROBLEM, REHAB EVAL
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 CT Head No Contrast on 11/21/17 displayed new right MCA infarct. No intracranial hemorrhage.

## 2017-11-22 NOTE — DISCHARGE NOTE ADULT - CARE PROVIDERS DIRECT ADDRESSES
[email or web address],DirectAddress_Unknown,DirectAddress_Unknown

## 2017-11-22 NOTE — CONSULT NOTE ADULT - SUBJECTIVE AND OBJECTIVE BOX
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 The patient is with left hemiparesis, right gaze deviation with left field cut, left sided neglect. Neurology is following and suspects a possible embolic event given patients history of AFib. The patient had a CVA around this time last year, 12/2016, at [facility]. At that time the patient was started on ASA 325mg and subsequently began to experience multiple episodes of hematemesis requiring 3 units of PRBC and Platelets and was intubated in the icu at that time per the patients daughter and chart review on sunrise. An EGD was not done at that time 2/2 increased cardiac comorbidities. Since that time the patient has been off anticoagulation and per her daughter she just recently stopped taking her protonix as per her PCP recommendations. The patient is also now dysphagia and inability to tolerate a speech and swallow and as such a cinesophagram is pending. Per daughter, the patient has been without any further episodes of melena, hematemesis or hematochezia since leaving the hospital in 2016.         ACE inhibitors (Angioedema)  caffeine (Stomach Upset)  lactose (Other)  morphine (Unknown)      acetaminophen   Tablet 650 milliGRAM(s) Oral every 6 hours PRN  acetaminophen   Tablet. 650 milliGRAM(s) Oral every 6 hours PRN  aspirin Suppository 300 milliGRAM(s) Rectal daily  dextrose 5% + sodium chloride 0.9%. 1000 milliLiter(s) IV Continuous <Continuous>  heparin  Injectable 5000 Unit(s) SubCutaneous every 8 hours  lactated ringers. 1000 milliLiter(s) IV Continuous <Continuous>  pantoprazole  Injectable 40 milliGRAM(s) IV Push daily        FAMILY HISTORY:  No pertinent family history in first degree relatives        Review of Systems: *per daughter    General:  No wt loss, fevers, chills, night sweats, fatigue  Eyes:  Good vision, no reported pain  ENT:  No sore throat, pain, runny nose, dysphagia  CV:  No pain, palpitations, no lightheadedness  Resp:  No dyspnea, cough, tachypnea, wheezing  GI: denies n/v/d/c, abdominal pain, melena or brbpr   :  No pain, bleeding, incontinence, nocturia  Muscle:  No pain, weakness  Psych:  No fatigue, insomnia, mood problems, depression  Endocrine:  No polyuria, polydypsia, cold/heat intolerance  Heme:  No petechiae, ecchymosis, easy bruisability  Skin:  No rash, tattoos, scars, edema    Relevant Family History:   n/c    Relevant Social History: n/c      Physical Exam:    Vital Signs:  Vital Signs Last 24 Hrs  T(C): 37.4 (22 Nov 2017 05:08), Max: 37.9 (21 Nov 2017 16:00)  T(F): 99.3 (22 Nov 2017 05:08), Max: 100.2 (21 Nov 2017 16:00)  HR: 60 (22 Nov 2017 05:08) (60 - 77)  BP: 132/72 (22 Nov 2017 05:08) (132/72 - 161/68)  BP(mean): 75 (21 Nov 2017 20:00) (75 - 89)  RR: 18 (22 Nov 2017 05:08) (14 - 21)  SpO2: 98% (22 Nov 2017 05:08) (98% - 100%)  Daily     Daily     General:  Appears stated age, well-groomed, nad  HEENT:  NC/AT,  conjunctivae clear and pink, no thyromegaly, nodules, adenopathy, no JVD  Chest:  Full & symmetric excursion, no increased effort, breath sounds clear  Cardiovascular:  Regular rhythm, S1, S2, no murmur/rub/S3/S4, no abdominal bruit, no edema  Abdomen:  Soft, non-tender, non-distended, normoactive bowel sounds,  no masses ,no hepatosplenomeagaly, no signs of chronic liver disease  Extremities:  no cyanosis,clubbing or edema  Skin:  No rash/erythema/ecchymoses/petechiae/wounds/abscess/warm/dry  Neuro/Psych:  A&O  , no asterixis, no tremor, no encephalopathy    Laboratory:                            11.8   6.41  )-----------( 140      ( 22 Nov 2017 05:10 )             36.5     11-22    138  |  103  |  16  ----------------------------<  70  4.4   |  22  |  0.88    Ca    8.4      22 Nov 2017 05:10  Phos  2.8     11-22  Mg     1.7     11-22    TPro  6.6  /  Alb  3.6  /  TBili  0.7  /  DBili  x   /  AST  31  /  ALT  17  /  AlkPhos  96  11-21    LIVER FUNCTIONS - ( 21 Nov 2017 02:00 )  Alb: 3.6 g/dL / Pro: 6.6 g/dL / ALK PHOS: 96 u/L / ALT: 17 u/L / AST: 31 u/L / GGT: x           PT/INR - ( 22 Nov 2017 05:10 )   PT: 12.4 SEC;   INR: 1.10          PTT - ( 22 Nov 2017 05:10 )  PTT:29.9 SEC      Imaging:    < from: CT Head No Cont (11.21.17 @ 14:55) >    EXAM:  CT BRAIN        PROCEDURE DATE:  Nov 21 2017         INTERPRETATION:  CT BRAIN    CLINICAL INDICATION: Status post TPA    TECHNIQUE: Noncontrast head CT was performed. Axial, sagittal and coronal   reformats were made.    COMPARISON: CT headperformed 11/20/2017.    FINDINGS:  There is a new right MCA infarct of the anterior and middle right   temporal lobe. There is no intracranial hemorrhage. There is no   significant mass effect on the ventricles or basal ganglia. There is no   hydrocephalus or midline shift.    The calvarium, skull base, paranasal sinuses, orbits and mastoid air   cells are normal.    IMPRESSION:    New right MCA infarct. No intracranial hemorrhage.     These findings were discussed with Dr. Romero at 11/21/2017 3:13 PM   by Dr. Gonzalez.              DELILAH GONZALEZ M.D., RADIOLOGY FELLOW  This document has been electronically signed.  MINESH CAPELLAN M.D., ATTENDING RADIOLOGIST  This document has been electronically signed. Nov 21 2017  3:43PM                  < end of copied text >

## 2017-11-22 NOTE — DISCHARGE NOTE ADULT - HOSPITAL COURSE
89 yo presents with left sided weakness. Patient had EKG, CXR,LABS, tele monitoring for arrythmia Patient with new right MCA infarcts. + Dysphagia. Patient had UTI with IV zosyn . Patient with atrial fib s/p lovenox ON COUMADIN. Patient to take 2mg alternate with 3mg. CT head new right MCA infarct. ECHO - 50% mitral annular calcification.  Carotid doppler no significant stenosis noted. 1015% only. UA and urine culture done with GNR. s/p IV zosyn. Patient stable for discharge to rehab please check INR twice a week on the coumadin 2mg alternate with 3mg qhs.  aStable for transfer to rehab per Dr Elder.

## 2017-11-22 NOTE — PROGRESS NOTE ADULT - SUBJECTIVE AND OBJECTIVE BOX
CHIEF COMPLAINT:r/o CVA    Interval Events:Pt seen and examined by me.Daughter at bedside  As per robert, pt with improving weakness of left side- she was able to lift  left leg better.  At onset of symptoms, pt was not able to speak, but improving - Able to speak .  Was getting upset as she knew she was in the hospital and wanted to go home.  As per daughter, pt  with  brief 1-2 episodes of confusion when she though she was at home  This AM. pt worked with PT        MEDICATIONS  (STANDING):  aspirin Suppository 300 milliGRAM(s) Rectal daily  heparin  Injectable 5000 Unit(s) SubCutaneous every 8 hours  lactated ringers. 1000 milliLiter(s) (50 mL/Hr) IV Continuous <Continuous>  pantoprazole  Injectable 40 milliGRAM(s) IV Push daily    MEDICATIONS  (PRN):  acetaminophen   Tablet 650 milliGRAM(s) Oral every 6 hours PRN For Temp greater than 38 C (100.4 F)  acetaminophen   Tablet. 650 milliGRAM(s) Oral every 6 hours PRN Mild Pain (1 - 3)  CAPILLARY BLOOD GLUCOSE      CAPILLARY BLOOD GLUCOSE        PHYSICAL EXAM:  General: sleeping   HEENT: L sided facial droop. Rightward gaze preference. dry mucous membranes  Lymph Nodes: no cervical lymphadenopathy  Neck: supple, no JVD  Respiratory: CTA b/l no wheezes, rales, ronchi. sufficient respiratory effort  Cardiovascular: +s1/s2, no murmurs, rubs, gallops  Abdomen: soft, nontender, nondistended  Extremities: 2+ pulses in distal extremities b/l  Skin: warm, dry, non cyanosis, clubbing, edema  Neurological: L sided weakness 4/5 on LUE and LLE. Patient has right sided gaze preference, dysarthria. L sided facial droop. Sensory intact. Able to raise eyebrows.  Psychiatry:    LABS:                           11.8   6.41  )-----------( 140      ( 22 Nov 2017 05:10 )             36.5      11-22    138  |  103  |  16  ----------------------------<  70  4.4   |  22  |  0.88    Ca    8.4      22 Nov 2017 05:10  Phos  2.8     11-22  Mg     1.7     11-22    TPro  6.6  /  Alb  3.6  /  TBili  0.7  /  DBili  x   /  AST  31  /  ALT  17  /  AlkPhos  96  11-21      MICROBIOLOGY:     RADIOLOGY:  [ ] Reviewed and interpreted by me    EKG:

## 2017-11-22 NOTE — OCCUPATIONAL THERAPY INITIAL EVALUATION ADULT - GENERAL OBSERVATIONS, REHAB EVAL
Pt. received semisupine in bed. No acute distress. +IV, +Tele, +B/L Venodynes. Daughter present bedside. PT present bedside to perform PT evaluation in conjunction with OT evaluation.

## 2017-11-22 NOTE — DISCHARGE NOTE ADULT - MEDICATION SUMMARY - MEDICATIONS TO STOP TAKING
I will STOP taking the medications listed below when I get home from the hospital:    leflunomide 20 mg oral tablet  -- 1 tab(s) by mouth once a day    methotrexate 2.5 mg oral tablet  -- 4 tab(s) by mouth once a week every thursday    predniSONE 5 mg oral tablet  -- 1 tab(s) by mouth once a day

## 2017-11-22 NOTE — OCCUPATIONAL THERAPY INITIAL EVALUATION ADULT - MD ORDER
Occupational Therapy (OT) to evaluate and treat. Occupational Therapy (OT) to evaluate and treat. Ambulate as Tolerated. Per KAITY Gómez, pt is okay to participate in OT evaluation and perform activity as tolerated.

## 2017-11-22 NOTE — PHYSICAL THERAPY INITIAL EVALUATION ADULT - RANGE OF MOTION EXAMINATION, REHAB EVAL
UE ROM WNL however Bilateral shoulder UE ROM 0-90 degrees with empty end feel./bilateral lower extremity ROM was WFL (within functional limits)/bilateral upper extremity ROM was WFL (within functional limits)

## 2017-11-22 NOTE — OCCUPATIONAL THERAPY INITIAL EVALUATION ADULT - DIAGNOSIS, OT EVAL
r/o CVA; Slurred/Garbled speech (baseline?); Bilateral UE weakness (Left weaker than Right); Decreased bilateral UE fine-motor coordination; Decreased sitting/standing balance; Decreased functional mobility; Decreased ADL management

## 2017-11-22 NOTE — CONSULT NOTE ADULT - PROBLEM SELECTOR RECOMMENDATION 9
- new right MCA infarct s/p TPA in ED   - neurology workup in progress  - TTE negative for LV or Atrial thrombus  - embolic process being ruled out

## 2017-11-22 NOTE — CONSULT NOTE ADULT - PROBLEM SELECTOR RECOMMENDATION 2
- pt has been off a/c since 12/2016 secondary to gib   - given the patients history of a life threatening gi bleed during her hospital stay in [place] and inability to undergo endoscopic evaluation d/t her cardiac comorbidities she would be at a moderate to high risk for rebleeding if anticoagulation were to be restarted  - consider palliative eval to discuss GOC with family
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
## 2017-11-22 NOTE — PHYSICAL THERAPY INITIAL EVALUATION ADULT - MANUAL MUSCLE TESTING RESULTS, REHAB EVAL
Bilateral shoulder muscle strength grossly 3-/5 T/O; Bilateral hip muscle strength grossly 3-/5 Throughout; remainined UE and LE muscle strength for bilateral extremities grossly 3/ 5 Throughout

## 2017-11-22 NOTE — OCCUPATIONAL THERAPY INITIAL EVALUATION ADULT - RANGE OF MOTION EXAMINATION, UPPER EXTREMITY
Right UE Active Assistive ROM was WFL  (within functional limits)/Left UE Passive ROM was WFL  (within functional limits)

## 2017-11-22 NOTE — DISCHARGE NOTE ADULT - CARE PROVIDER_API CALL
Libman, Richard B (MD), Neurology; Vascular Neurology  [address] [facility] [facility] [address]  [place] [address]  Phone: [phone]  Fax: [phone]    Jaja Fuller), Internal Medicine  [address] [place] [address]  Phone: [phone]  Fax: [phone]    Merlin Brewster), Cardiology; Internal Medicine  [address] [place] [address]  Phone: [phone]  Fax: [phone]

## 2017-11-22 NOTE — SWALLOW BEDSIDE ASSESSMENT ADULT - SWALLOW EVAL: RECOMMENDED DIET
1) Consider temporary non-oral means of nutrition/hydration/medication at this time; 2) Aspiration Precautions; 3) Meticulous oral care to minimize risk for aspiration of oral pathogens; 4) Consider cinesophagram to objectively assess swallow function when overall medical status improves 1) Consider temporary non-oral means of nutrition/hydration/medication at this time; 2) Aspiration Precautions; 3) Meticulous oral care to minimize risk for aspiration of oral pathogens; 4) Cinesophagram to objectively assess swallow function and r/o aspiration; 5) Suggest ENT consult 2/2 worsening hoarse/harsh vocal quality

## 2017-11-22 NOTE — SWALLOW BEDSIDE ASSESSMENT ADULT - PHARYNGEAL PHASE
Decreased laryngeal elevation/Throat clear post oral intake/Delayed cough post oral intake/Delayed pharyngeal swallow

## 2017-11-22 NOTE — OCCUPATIONAL THERAPY INITIAL EVALUATION ADULT - LIVES WITH, PROFILE
Pt.'s daughter reports pt. lives with her daughter in a house with ramp available to enter. Once inside, pt.'s daughter reports there are two steps, a chair lift, and then four additional steps to negotiate for pt to reach the second floor where main bedroom and bathroom are located.

## 2017-11-22 NOTE — PROGRESS NOTE ADULT - PROBLEM SELECTOR PLAN 4
on Zoloft which is on hold as pt NPO documented on previous admission  Tele- NSR  Neuro recommends AC if pt fails ss  GI- Dr Cortez called for clearance if AC needed, as pt with previous history of GIB   likely due to PUD with ASA/Prednisone

## 2017-11-22 NOTE — SWALLOW BEDSIDE ASSESSMENT ADULT - COMMENTS
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   Pt received TPA in the ED for suspected R MCA occlusion. Pt currently has left hemiparesis, right gaze deviation with left field cut, left sided neglect and extinction consistent with a RMCA stroke which is confirmed on repeat  CT head yesterday. This was most likely an embolic event, suspect cardioembolism although not confirmed but pt at increased risk for Afib due to age and dilated left atrium.     Patient seen at bedside. Patient's daughter (Karma) is present. Patient appears lethargic. Patient is given verbal/tactile cues to increase level of alertness (e.g. ice chip); however patient is not able to become actively alert despite cues. Patient is noted with mild drooling when position upright in bed. Patient is also noted to be spontaneously throat clearing which may be indication of reduced saliva management and exhibiting some sensation to the secretions but does not attempt to clear effectively despite cues.   This service will reattempt for a clinical swallow evaluation.

## 2017-11-23 LAB
BASOPHILS # BLD AUTO: 0.03 K/UL — SIGNIFICANT CHANGE UP (ref 0–0.2)
BASOPHILS NFR BLD AUTO: 0.5 % — SIGNIFICANT CHANGE UP (ref 0–2)
BUN SERPL-MCNC: 11 MG/DL — SIGNIFICANT CHANGE UP (ref 7–23)
CALCIUM SERPL-MCNC: 7.9 MG/DL — LOW (ref 8.4–10.5)
CHLORIDE SERPL-SCNC: 102 MMOL/L — SIGNIFICANT CHANGE UP (ref 98–107)
CO2 SERPL-SCNC: 24 MMOL/L — SIGNIFICANT CHANGE UP (ref 22–31)
CREAT SERPL-MCNC: 0.81 MG/DL — SIGNIFICANT CHANGE UP (ref 0.5–1.3)
EOSINOPHIL # BLD AUTO: 0.1 K/UL — SIGNIFICANT CHANGE UP (ref 0–0.5)
EOSINOPHIL NFR BLD AUTO: 1.6 % — SIGNIFICANT CHANGE UP (ref 0–6)
GLUCOSE SERPL-MCNC: 77 MG/DL — SIGNIFICANT CHANGE UP (ref 70–99)
HCT VFR BLD CALC: 34.9 % — SIGNIFICANT CHANGE UP (ref 34.5–45)
HGB BLD-MCNC: 10.6 G/DL — LOW (ref 11.5–15.5)
IMM GRANULOCYTES # BLD AUTO: 0.07 # — SIGNIFICANT CHANGE UP
IMM GRANULOCYTES NFR BLD AUTO: 1.1 % — SIGNIFICANT CHANGE UP (ref 0–1.5)
INR BLD: 1.14 — SIGNIFICANT CHANGE UP (ref 0.88–1.17)
LYMPHOCYTES # BLD AUTO: 1.23 K/UL — SIGNIFICANT CHANGE UP (ref 1–3.3)
LYMPHOCYTES # BLD AUTO: 19.8 % — SIGNIFICANT CHANGE UP (ref 13–44)
MAGNESIUM SERPL-MCNC: 1.9 MG/DL — SIGNIFICANT CHANGE UP (ref 1.6–2.6)
MCHC RBC-ENTMCNC: 29.9 PG — SIGNIFICANT CHANGE UP (ref 27–34)
MCHC RBC-ENTMCNC: 30.4 % — LOW (ref 32–36)
MCV RBC AUTO: 98.6 FL — SIGNIFICANT CHANGE UP (ref 80–100)
MONOCYTES # BLD AUTO: 0.81 K/UL — SIGNIFICANT CHANGE UP (ref 0–0.9)
MONOCYTES NFR BLD AUTO: 13 % — SIGNIFICANT CHANGE UP (ref 2–14)
NEUTROPHILS # BLD AUTO: 3.98 K/UL — SIGNIFICANT CHANGE UP (ref 1.8–7.4)
NEUTROPHILS NFR BLD AUTO: 64 % — SIGNIFICANT CHANGE UP (ref 43–77)
NRBC # FLD: 0 — SIGNIFICANT CHANGE UP
PHOSPHATE SERPL-MCNC: 2.4 MG/DL — LOW (ref 2.5–4.5)
PLATELET # BLD AUTO: 145 K/UL — LOW (ref 150–400)
PMV BLD: 10 FL — SIGNIFICANT CHANGE UP (ref 7–13)
POTASSIUM SERPL-MCNC: 4.3 MMOL/L — SIGNIFICANT CHANGE UP (ref 3.5–5.3)
POTASSIUM SERPL-SCNC: 4.3 MMOL/L — SIGNIFICANT CHANGE UP (ref 3.5–5.3)
PROTHROM AB SERPL-ACNC: 12.8 SEC — SIGNIFICANT CHANGE UP (ref 9.8–13.1)
RBC # BLD: 3.54 M/UL — LOW (ref 3.8–5.2)
RBC # FLD: 15.4 % — HIGH (ref 10.3–14.5)
SODIUM SERPL-SCNC: 138 MMOL/L — SIGNIFICANT CHANGE UP (ref 135–145)
WBC # BLD: 6.22 K/UL — SIGNIFICANT CHANGE UP (ref 3.8–10.5)
WBC # FLD AUTO: 6.22 K/UL — SIGNIFICANT CHANGE UP (ref 3.8–10.5)

## 2017-11-23 PROCEDURE — 99233 SBSQ HOSP IP/OBS HIGH 50: CPT

## 2017-11-23 RX ADMIN — Medication 300 MILLIGRAM(S): at 12:03

## 2017-11-23 RX ADMIN — SODIUM CHLORIDE 50 MILLILITER(S): 9 INJECTION, SOLUTION INTRAVENOUS at 08:27

## 2017-11-23 RX ADMIN — HEPARIN SODIUM 5000 UNIT(S): 5000 INJECTION INTRAVENOUS; SUBCUTANEOUS at 21:18

## 2017-11-23 RX ADMIN — SODIUM CHLORIDE 50 MILLILITER(S): 9 INJECTION, SOLUTION INTRAVENOUS at 21:18

## 2017-11-23 RX ADMIN — PANTOPRAZOLE SODIUM 40 MILLIGRAM(S): 20 TABLET, DELAYED RELEASE ORAL at 12:03

## 2017-11-23 RX ADMIN — SODIUM CHLORIDE 50 MILLILITER(S): 9 INJECTION, SOLUTION INTRAVENOUS at 06:29

## 2017-11-23 NOTE — PROGRESS NOTE ADULT - PROBLEM SELECTOR PLAN 3
pt has been off a/c since 12/2016 secondary to gib   - given the patients history of a life threatening gi bleed during her hospital stay in [place] and inability to undergo endoscopic evaluation d/t her cardiac comorbidities she would be at a moderate to high risk for rebleeding if anticoagulation were to be restarted

## 2017-11-23 NOTE — PROGRESS NOTE ADULT - PROBLEM SELECTOR PLAN 1
likely Acute CVA with aphasia, and left hemiparesis s/p TPA  s/p MICU   Appreciate Neurology consult- Pt with left hemiparesis, right gaze deviation with left field cut, left sided neglect and extinction consistent with a RMCA stroke which is confirmed on repeat  CT head This was most likely an embolic event, suspect cardioembolism although not confirmed but pt at increased risk for Afib due to age and dilated left atrium   Neuro recommendations -Gradual normotension as patient now outside of 24hr window.  Aspirin daily and DVT ppx    Atorvastatin 80 mg at bedtime once able to pass the swallow evaluation or stable enteral access is established  SCDs for DVT prophylaxis in the interim  Carotid Ultrasound: no significant stenosis  No indications for performing MRI at this time as it would not likely change the management  TTE negative for LV or Atrial thrombus.  HbA1c 5.4,   DW Neuro -Pt will likely AC for P Afib once pt paases speech and swallow   - PT/OT/speech and swallow evaluation repeat on 11/24 likely Acute CVA with aphasia, and left hemiparesis s/p TPA  s/p MICU   Appreciate Neurology consult- Pt with left hemiparesis, right gaze deviation with left field cut, left sided neglect and extinction consistent with a RMCA stroke which is confirmed on repeat  CT head This was most likely an embolic event, suspect cardioembolism although not confirmed but pt at increased risk for Afib due to age and dilated left atrium   Neuro recommendations -Gradual normotension as patient now outside of 24hr window.  Aspirin daily and DVT ppx    Atorvastatin 80 mg at bedtime once able to pass the swallow evaluation or stable enteral access is established  SCDs for DVT prophylaxis in the interim  Carotid Ultrasound: no significant stenosis  No indications for performing MRI at this time as it would not likely change the management  TTE negative for LV or Atrial thrombus.  HbA1c 5.4,   DW Neuro -Pt will likely AC for CVA prevention due to hx P Afib once pt passes speech and swallow; however has hx life threatening GIB, may not be stable for anesthesia to undergo EGD.  Need to clarify whether pt is candidate for EGD with anesthesia and GI.  If not, risks and benefits of AC to be discussed with family before starting AC.  Would favor reversible agent  - PT/OT/speech and swallow evaluation repeat on 11/24

## 2017-11-23 NOTE — PROGRESS NOTE ADULT - PROBLEM SELECTOR PLAN 2
unable to fully participate in speech and swallow   - speech recommending NPO, f/u Cinesophagram   - on D5 ivf  - may consider NGT feeds if family agreeable   - consider palliative eval to discuss GOC with family.

## 2017-11-23 NOTE — PROGRESS NOTE ADULT - SUBJECTIVE AND OBJECTIVE BOX
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 Patient lives at home with daughter but has a home health aid that assists with ADLs. Patient went to bathroom at 11:30 and soon after cleaning up, was suddenly unable to lift her left leg/arm. Home health aid also noticed a left sided facial droop and that the patient was unable to speak, although was alert with her eyes open and bobbing her head. The home health aid immediately called daughter for help and patient was brought into ED. At her baseline, she is able to ambulate with a walker, bathe, eat, and perform other ADLs with assistance from the home health aid. Daughter reports that she has been otherwise well recently and has no complaints of recent illness, cough, fever, nausea, vomiting, diarrhea, weakness, no other complaints. Daughter states that patient was admitted to the ICU 1 year ago for a CVA and was put on blood thinners that resulted in her having a massive GI bleed. Blood thinners were discontinued at that time and patient has not been on anticoagulation since. Patient was attending physical therapy from Dec 2016 to February 2017. Daughter also endorses that her mother also had a transient episode of AFib during her last admission but has not been treated for it. (20 Nov 2017 19:30)    MEDICATIONS  (STANDING):  aspirin Suppository 300 milliGRAM(s) Rectal daily  dextrose 5% + sodium chloride 0.9%. 1000 milliLiter(s) (50 mL/Hr) IV Continuous <Continuous>  heparin  Injectable 5000 Unit(s) SubCutaneous every 8 hours  pantoprazole  Injectable 40 milliGRAM(s) IV Push daily    MEDICATIONS  (PRN):  acetaminophen   Tablet 650 milliGRAM(s) Oral every 6 hours PRN For Temp greater than 38 C (100.4 F)  acetaminophen   Tablet. 650 milliGRAM(s) Oral every 6 hours PRN Mild Pain (1 - 3)      Allergies    ACE inhibitors (Angioedema)  morphine (Unknown)    Intolerances    caffeine (Stomach Upset)  lactose (Other)        General:  No wt loss, fevers, chills, night sweats, fatigue,   Eyes:  Good vision, no reported pain  ENT:  + dysphagia  CV:  No pain, palpitations, hypo/hypertension  Resp:  No dyspnea, cough, tachypnea, wheezing  GI:  No pain, No nausea, No vomiting, No diarrhea, No constipation, No weight loss, No fever, No pruritis, No rectal bleeding, No tarry stools, No dysphagia,  :  No pain, bleeding, incontinence, nocturia  Muscle:  LUE weakness  Neuro:  No weakness, tingling, memory problems  Psych:  No fatigue, insomnia, mood problems, depression  Endocrine:  No polyuria, polydipsia, cold/heat intolerance  Heme:  No petechiae, ecchymosis, easy bruisability  Skin:  No rash, tattoos, scars, edema      PHYSICAL EXAM:   Vital Signs:  Vital Signs Last 24 Hrs  T(C): 36.7 (23 Nov 2017 05:30), Max: 37 (22 Nov 2017 14:30)  T(F): 98 (23 Nov 2017 05:30), Max: 98.6 (22 Nov 2017 14:30)  HR: 55 (23 Nov 2017 05:30) (55 - 65)  BP: 176/67 (23 Nov 2017 05:30) (139/62 - 176/67)  BP(mean): --  RR: 16 (23 Nov 2017 05:30) (16 - 18)  SpO2: 99% (23 Nov 2017 05:30) (99% - 100%)  Daily     Daily I&O's Summary      GENERAL:  Appears stated age, well-groomed, well-nourished, no distress  HEENT:  NC/AT,  conjunctivae clear and pink, no thyromegaly, nodules, adenopathy, no JVD, sclera -anicteric  CHEST:  Full & symmetric excursion, no increased effort, breath sounds clear  HEART:  Regular rhythm, S1, S2, no murmur/rub/S3/S4, no abdominal bruit, no edema  ABDOMEN:  Soft, non-tender, non-distended, normoactive bowel sounds,  no masses ,no hepato-splenomegaly, no signs of chronic liver disease  EXTEREMITIES:  no cyanosis,clubbing or edema  SKIN:  No rash/erythema/ecchymoses/petechiae/wounds/abscess/warm/dry  NEURO:  LUE weakness      LABS:                        10.6   6.22  )-----------( 145      ( 23 Nov 2017 06:21 )             34.9     11-23    138  |  102  |  11  ----------------------------<  77  4.3   |  24  |  0.81    Ca    7.9<L>      23 Nov 2017 06:21  Phos  2.4     11-23  Mg     1.9     11-23      PT/INR - ( 23 Nov 2017 06:21 )   PT: 12.8 SEC;   INR: 1.14          PTT - ( 22 Nov 2017 05:10 )  PTT:29.9 SEC    amylase   lipase  RADIOLOGY & ADDITIONAL TESTS:

## 2017-11-23 NOTE — PROGRESS NOTE ADULT - SUBJECTIVE AND OBJECTIVE BOX
Patient is a 89y old  Female who presents with a chief complaint of CVA (22 Nov 2017 09:17)      SUBJECTIVE / OVERNIGHT EVENTS: No events    MEDICATIONS  (STANDING):  aspirin Suppository 300 milliGRAM(s) Rectal daily  dextrose 5% + sodium chloride 0.9%. 1000 milliLiter(s) (50 mL/Hr) IV Continuous <Continuous>  heparin  Injectable 5000 Unit(s) SubCutaneous every 8 hours  pantoprazole  Injectable 40 milliGRAM(s) IV Push daily    MEDICATIONS  (PRN):  acetaminophen   Tablet 650 milliGRAM(s) Oral every 6 hours PRN For Temp greater than 38 C (100.4 F)  acetaminophen   Tablet. 650 milliGRAM(s) Oral every 6 hours PRN Mild Pain (1 - 3)      Vital Signs Last 24 Hrs  T(C): 36.7 (23 Nov 2017 05:30), Max: 37 (22 Nov 2017 14:30)  T(F): 98 (23 Nov 2017 05:30), Max: 98.6 (22 Nov 2017 14:30)  HR: 55 (23 Nov 2017 05:30) (55 - 65)  BP: 176/67 (23 Nov 2017 05:30) (139/62 - 176/67)  BP(mean): --  RR: 16 (23 Nov 2017 05:30) (16 - 18)  SpO2: 99% (23 Nov 2017 05:30) (99% - 100%)  CAPILLARY BLOOD GLUCOSE        I&O's Summary      PHYSICAL EXAM:  General: NAD  HEENT: L sided facial droop. Rightward gaze preference. dry mucous membranes  Lymph Nodes: no cervical lymphadenopathy  Neck: supple, no JVD  Respiratory: CTA b/l no wheezes, rales, ronchi. sufficient respiratory effort  Cardiovascular: +s1/s2, no murmurs, rubs, gallops  Abdomen: soft, nontender, nondistended  Extremities: 2+ pulses in distal extremities b/l  Skin: warm, dry, non cyanosis, clubbing, edema  Neurological: L sided weakness 4/5 on LUE and LLE. Patient has right sided gaze preference, dysarthria. L sided facial droop. Sensory intact. Able to raise eyebrows.    LABS:                        10.6   6.22  )-----------( 145      ( 23 Nov 2017 06:21 )             34.9     11-23    138  |  102  |  11  ----------------------------<  77  4.3   |  24  |  0.81    Ca    7.9<L>      23 Nov 2017 06:21  Phos  2.4     11-23  Mg     1.9     11-23      PT/INR - ( 23 Nov 2017 06:21 )   PT: 12.8 SEC;   INR: 1.14          PTT - ( 22 Nov 2017 05:10 )  PTT:29.9 SEC          RADIOLOGY & ADDITIONAL TESTS:    Carotid dopplers 11/22:   Summary/Impressions:  Minimal heterogenous plaque noted within the proximal  right and left internal carotid arteries, consistent with  1-15% stenoses.  No hemodynamically significant stenoses  noted. Patient is a 89y old  Female who presents with a chief complaint of CVA (22 Nov 2017 09:17)      SUBJECTIVE / OVERNIGHT EVENTS: Per pt's daughter GI told her pt should have EGD before decision on AC.     MEDICATIONS  (STANDING):  aspirin Suppository 300 milliGRAM(s) Rectal daily  dextrose 5% + sodium chloride 0.9%. 1000 milliLiter(s) (50 mL/Hr) IV Continuous <Continuous>  heparin  Injectable 5000 Unit(s) SubCutaneous every 8 hours  pantoprazole  Injectable 40 milliGRAM(s) IV Push daily    MEDICATIONS  (PRN):  acetaminophen   Tablet 650 milliGRAM(s) Oral every 6 hours PRN For Temp greater than 38 C (100.4 F)  acetaminophen   Tablet. 650 milliGRAM(s) Oral every 6 hours PRN Mild Pain (1 - 3)      Vital Signs Last 24 Hrs  T(C): 36.7 (23 Nov 2017 05:30), Max: 37 (22 Nov 2017 14:30)  T(F): 98 (23 Nov 2017 05:30), Max: 98.6 (22 Nov 2017 14:30)  HR: 55 (23 Nov 2017 05:30) (55 - 65)  BP: 176/67 (23 Nov 2017 05:30) (139/62 - 176/67)  BP(mean): --  RR: 16 (23 Nov 2017 05:30) (16 - 18)  SpO2: 99% (23 Nov 2017 05:30) (99% - 100%)  CAPILLARY BLOOD GLUCOSE        I&O's Summary      PHYSICAL EXAM:  General: NAD  HEENT: L sided facial droop. Rightward gaze preference. dry mucous membranes  Lymph Nodes: no cervical lymphadenopathy  Neck: supple, no JVD  Respiratory: CTA b/l no wheezes, rales, ronchi. sufficient respiratory effort  Cardiovascular: +s1/s2, no murmurs, rubs, gallops  Abdomen: soft, nontender, nondistended  Extremities: 2+ pulses in distal extremities b/l  Skin: warm, dry, non cyanosis, clubbing, edema  Neurological: L sided weakness 4/5 on LUE and LLE. Patient has right sided gaze preference, dysarthria. L sided facial droop. Sensory intact. Able to raise eyebrows.    LABS:                        10.6   6.22  )-----------( 145      ( 23 Nov 2017 06:21 )             34.9     11-23    138  |  102  |  11  ----------------------------<  77  4.3   |  24  |  0.81    Ca    7.9<L>      23 Nov 2017 06:21  Phos  2.4     11-23  Mg     1.9     11-23      PT/INR - ( 23 Nov 2017 06:21 )   PT: 12.8 SEC;   INR: 1.14          PTT - ( 22 Nov 2017 05:10 )  PTT:29.9 SEC          RADIOLOGY & ADDITIONAL TESTS:    Carotid dopplers 11/22:   Summary/Impressions:  Minimal heterogenous plaque noted within the proximal  right and left internal carotid arteries, consistent with  1-15% stenoses.  No hemodynamically significant stenoses  noted.    GI note reviewed

## 2017-11-23 NOTE — PROGRESS NOTE ADULT - PROBLEM SELECTOR PLAN 1
new right MCA infarct s/p TPA in ED   - neurology workup in progress  - TTE negative for LV or Atrial thrombus

## 2017-11-23 NOTE — PROGRESS NOTE ADULT - PROBLEM SELECTOR PLAN 6
pt on MTX, Prednsione and Leflunomide  On hold as pt NPO pt had been on MTX, Prednsione and Leflunomide  On hold as pt NPO

## 2017-11-23 NOTE — PROGRESS NOTE ADULT - ASSESSMENT
89F with hx of CVA, OA, RA, PUD,  previous GIB,. aw p/w L sided weakness. She received tpa in the ED

## 2017-11-23 NOTE — PROGRESS NOTE ADULT - PROBLEM SELECTOR PLAN 4
documented on previous admission  Tele- NSR  Neuro recommends AC if pt fails ss  GI- Dr Cortez called for clearance if AC needed, as pt with previous history of GIB   likely due to PUD with ASA/Prednisone documented on previous admission  Tele- NSR  Neuro recommends AC if pt fails ss, however GIB risk as above

## 2017-11-24 DIAGNOSIS — Z71.89 OTHER SPECIFIED COUNSELING: ICD-10-CM

## 2017-11-24 LAB — OB PNL STL: NEGATIVE — SIGNIFICANT CHANGE UP

## 2017-11-24 PROCEDURE — 99233 SBSQ HOSP IP/OBS HIGH 50: CPT

## 2017-11-24 PROCEDURE — 99497 ADVNCD CARE PLAN 30 MIN: CPT

## 2017-11-24 PROCEDURE — 74230 X-RAY XM SWLNG FUNCJ C+: CPT | Mod: 26

## 2017-11-24 RX ORDER — HEPARIN SODIUM 5000 [USP'U]/ML
5000 INJECTION INTRAVENOUS; SUBCUTANEOUS EVERY 12 HOURS
Qty: 0 | Refills: 0 | Status: DISCONTINUED | OUTPATIENT
Start: 2017-11-24 | End: 2017-11-29

## 2017-11-24 RX ADMIN — SODIUM CHLORIDE 50 MILLILITER(S): 9 INJECTION, SOLUTION INTRAVENOUS at 22:10

## 2017-11-24 RX ADMIN — SODIUM CHLORIDE 50 MILLILITER(S): 9 INJECTION, SOLUTION INTRAVENOUS at 05:39

## 2017-11-24 RX ADMIN — PANTOPRAZOLE SODIUM 40 MILLIGRAM(S): 20 TABLET, DELAYED RELEASE ORAL at 13:02

## 2017-11-24 RX ADMIN — SODIUM CHLORIDE 50 MILLILITER(S): 9 INJECTION, SOLUTION INTRAVENOUS at 07:18

## 2017-11-24 RX ADMIN — Medication 300 MILLIGRAM(S): at 13:02

## 2017-11-24 RX ADMIN — HEPARIN SODIUM 5000 UNIT(S): 5000 INJECTION INTRAVENOUS; SUBCUTANEOUS at 17:50

## 2017-11-24 NOTE — PROGRESS NOTE ADULT - SUBJECTIVE AND OBJECTIVE BOX
INTERVAL HPI/OVERNIGHT EVENTS:    pt seen with daughter bedside  passed speech and swallow this am   no abdominal events    MEDICATIONS  (STANDING):  aspirin Suppository 300 milliGRAM(s) Rectal daily  dextrose 5% + sodium chloride 0.9%. 1000 milliLiter(s) (50 mL/Hr) IV Continuous <Continuous>  heparin  Injectable 5000 Unit(s) SubCutaneous every 8 hours  pantoprazole  Injectable 40 milliGRAM(s) IV Push daily    MEDICATIONS  (PRN):  acetaminophen   Tablet 650 milliGRAM(s) Oral every 6 hours PRN For Temp greater than 38 C (100.4 F)  acetaminophen   Tablet. 650 milliGRAM(s) Oral every 6 hours PRN Mild Pain (1 - 3)      Allergies    ACE inhibitors (Angioedema)  morphine (Unknown)    Intolerances    caffeine (Stomach Upset)  lactose (Other)      Review of Systems: *per daughter    General:  No wt loss, fevers, chills, night sweats, fatigue   Eyes:  Good vision, no reported pain  ENT:  No sore throat, pain, runny nose, dysphagia  CV:  No pain, palpitations, hypo/hypertension  Resp:  No dyspnea, cough, tachypnea, wheezing  GI:  No pain, No nausea, No vomiting, No diarrhea, No constipation, No weight loss, No fever, No pruritis, No rectal bleeding, No melena, No dysphagia  :  No pain, bleeding, incontinence, nocturia  Muscle:  No pain, weakness  Neuro:  No weakness, tingling, memory problems  Psych:  No fatigue, insomnia, mood problems, depression  Endocrine:  No polyuria, polydypsia, cold/heat intolerance  Heme:  No petechiae, ecchymosis, easy bruisability  Skin:  No rash, tattoos, scars, edema      Vital Signs Last 24 Hrs  T(C): 36.8 (24 Nov 2017 05:37), Max: 37.5 (23 Nov 2017 15:35)  T(F): 98.2 (24 Nov 2017 05:37), Max: 99.5 (23 Nov 2017 15:35)  HR: 57 (24 Nov 2017 05:37) (57 - 70)  BP: 154/64 (24 Nov 2017 05:37) (147/77 - 161/77)  BP(mean): --  RR: 18 (24 Nov 2017 05:37) (18 - 18)  SpO2: 99% (24 Nov 2017 05:37) (98% - 100%)    PHYSICAL EXAM:    Constitutional: NAD  HEENT: EOMI, throat clear  Neck: No LAD, supple  Respiratory: CTA and P  Cardiovascular: S1 and S2, RRR, no M  Gastrointestinal: BS+, soft, NT/ND, neg HSM,  Extremities: No peripheral edema, neg clubbing, cyanosis  Vascular: 2+ peripheral pulses  Neurological: A/O  Psychiatric: Normal mood, normal affect  Skin: No rashes      LABS:                        10.6   6.22  )-----------( 145      ( 23 Nov 2017 06:21 )             34.9     11-23    138  |  102  |  11  ----------------------------<  77  4.3   |  24  |  0.81    Ca    7.9<L>      23 Nov 2017 06:21  Phos  2.4     11-23  Mg     1.9     11-23      PT/INR - ( 23 Nov 2017 06:21 )   PT: 12.8 SEC;   INR: 1.14                RADIOLOGY & ADDITIONAL TESTS:

## 2017-11-24 NOTE — SWALLOW VFSS/MBS ASSESSMENT ADULT - PHARYNGEAL PHASE COMMENTS
Delayed swallow trigger; Decreased base of tongue retraction; Reduced laryngeal elevation with incomplete airway closure, Decreased pharyngeal constriction. There was mild-moderate residue in the valleculae and pyriform sinus post swallow with puree, which partially cleared with honey-thick liquid. Delayed swallow trigger with the bolus head reaching the pyriform sinus, Decreased base of tongue retraction, Reduced laryngeal elevation with incomplete airway closure, and Decreased pharyngeal constriction. Delayed swallow trigger with the bolus head at the valleculae for puree and reaching the pyriform sinus for honey-thick; Decreased base of tongue retraction; Reduced laryngeal elevation with incomplete airway closure, Decreased pharyngeal constriction. There was mild-moderate residue in the valleculae and pyriform sinus post swallow with puree. Honey-thick liquid was shown to assist in pharyngeal clearance. Delayed swallow trigger with the bolus head reaching the pyriform sinus; Decreased base of tongue retraction, Reduced laryngeal elevation with incomplete airway closure, and Decreased pharyngeal constriction. Unable to assess

## 2017-11-24 NOTE — PROGRESS NOTE ADULT - PROBLEM SELECTOR PLAN 1
likely Acute CVA with aphasia, and left hemiparesis s/p TPA  s/p MICU   Appreciate Neurology consult- Pt with left hemiparesis, right gaze deviation with left field cut, left sided neglect and extinction consistent with a RMCA stroke which is confirmed on repeat  CT head This was most likely an embolic event, suspect cardioembolism although not confirmed but pt at increased risk for Afib due to age and dilated left atrium   Neuro recommendations -Gradual normotension as patient now outside of 24hr window.  Aspirin daily and DVT ppx    Atorvastatin 80 mg at bedtime once able to pass the swallow evaluation or stable enteral access is established  SCDs for DVT prophylaxis in the interim  Carotid Ultrasound: no significant stenosis  No indications for performing MRI at this time as it would not likely change the management  TTE negative for LV or Atrial thrombus.  HbA1c 5.4,   DW Neuro -Pt will likely AC for CVA prevention due to hx P Afib once pt passes speech and swallow; however has hx life threatening GIB, may not be stable for anesthesia to undergo EGD.  Need to clarify whether pt is candidate for EGD with anesthesia and GI.  If not, risks and benefits of AC to be discussed with family before starting AC.  Would favor reversible agent  - PT/OT/speech and swallow evaluation repeat on 11/24

## 2017-11-24 NOTE — PROGRESS NOTE ADULT - SUBJECTIVE AND OBJECTIVE BOX
Patient is a 89y old  Female who presents with a chief complaint of CVA (22 Nov 2017 09:17)      SUBJECTIVE / OVERNIGHT EVENTS:        MEDICATIONS  (STANDING):  aspirin Suppository 300 milliGRAM(s) Rectal daily  dextrose 5% + sodium chloride 0.9%. 1000 milliLiter(s) (50 mL/Hr) IV Continuous <Continuous>  heparin  Injectable 5000 Unit(s) SubCutaneous every 8 hours  pantoprazole  Injectable 40 milliGRAM(s) IV Push daily    MEDICATIONS  (PRN):  acetaminophen   Tablet 650 milliGRAM(s) Oral every 6 hours PRN For Temp greater than 38 C (100.4 F)  acetaminophen   Tablet. 650 milliGRAM(s) Oral every 6 hours PRN Mild Pain (1 - 3)      Vital Signs Last 24 Hrs  T(C): 36.7 (23 Nov 2017 05:30), Max: 37 (22 Nov 2017 14:30)  T(F): 98 (23 Nov 2017 05:30), Max: 98.6 (22 Nov 2017 14:30)  HR: 55 (23 Nov 2017 05:30) (55 - 65)  BP: 176/67 (23 Nov 2017 05:30) (139/62 - 176/67)  BP(mean): --  RR: 16 (23 Nov 2017 05:30) (16 - 18)  SpO2: 99% (23 Nov 2017 05:30) (99% - 100%)  CAPILLARY BLOOD GLUCOSE        I&O's Summary      PHYSICAL EXAM:  General: NAD  HEENT: L sided facial droop. Rightward gaze preference. dry mucous membranes  Lymph Nodes: no cervical lymphadenopathy  Neck: supple, no JVD  Respiratory: CTA b/l no wheezes, rales, ronchi. sufficient respiratory effort  Cardiovascular: +s1/s2, no murmurs, rubs, gallops  Abdomen: soft, nontender, nondistended  Extremities: 2+ pulses in distal extremities b/l  Skin: warm, dry, non cyanosis, clubbing, edema  Neurological: L sided weakness 4/5 on LUE and LLE. Patient has right sided gaze preference, dysarthria. L sided facial droop. Sensory intact. Able to raise eyebrows.    LABS:                        10.6   6.22  )-----------( 145      ( 23 Nov 2017 06:21 )             34.9     11-23    138  |  102  |  11  ----------------------------<  77  4.3   |  24  |  0.81    Ca    7.9<L>      23 Nov 2017 06:21  Phos  2.4     11-23  Mg     1.9     11-23      PT/INR - ( 23 Nov 2017 06:21 )   PT: 12.8 SEC;   INR: 1.14          PTT - ( 22 Nov 2017 05:10 )  PTT:29.9 SEC          RADIOLOGY & ADDITIONAL TESTS:    Carotid dopplers 11/22:   Summary/Impressions:  Minimal heterogenous plaque noted within the proximal  right and left internal carotid arteries, consistent with  1-15% stenoses.  No hemodynamically significant stenoses  noted.    GI note reviewed Patient is a 89y old  Female who presents with a chief complaint of CVA (22 Nov 2017 09:17)      SUBJECTIVE / OVERNIGHT EVENTS: pt more awake, verbal, able to have full ocnversations  No new events.     MEDICATIONS  (STANDING):  aspirin Suppository 300 milliGRAM(s) Rectal daily  dextrose 5% + sodium chloride 0.9%. 1000 milliLiter(s) (50 mL/Hr) IV Continuous <Continuous>  heparin  Injectable 5000 Unit(s) SubCutaneous every 8 hours  pantoprazole  Injectable 40 milliGRAM(s) IV Push daily    MEDICATIONS  (PRN):  acetaminophen   Tablet 650 milliGRAM(s) Oral every 6 hours PRN For Temp greater than 38 C (100.4 F)  acetaminophen   Tablet. 650 milliGRAM(s) Oral every 6 hours PRN Mild Pain (1 - 3)      Vital Signs Last 24 Hrs  T(C): 36.7 (23 Nov 2017 05:30), Max: 37 (22 Nov 2017 14:30)  T(F): 98 (23 Nov 2017 05:30), Max: 98.6 (22 Nov 2017 14:30)  HR: 55 (23 Nov 2017 05:30) (55 - 65)  BP: 176/67 (23 Nov 2017 05:30) (139/62 - 176/67)  BP(mean): --  RR: 16 (23 Nov 2017 05:30) (16 - 18)  SpO2: 99% (23 Nov 2017 05:30) (99% - 100%)  CAPILLARY BLOOD GLUCOSE        I&O's Summary      PHYSICAL EXAM:  General: NAD  HEENT: L sided facial droop. Rightward gaze preference. dry mucous membranes  Lymph Nodes: no cervical lymphadenopathy  Neck: supple, no JVD  Respiratory: CTA b/l no wheezes, rales, ronchi. sufficient respiratory effort  Cardiovascular: +s1/s2, no murmurs, rubs, gallops  Abdomen: soft, nontender, nondistended  Extremities: 2+ pulses in distal extremities b/l  Skin: warm, dry, non cyanosis, clubbing, edema  Neurological: L sided weakness 4/5 on LUE and LLE. Patient has right sided gaze preference, dysarthria. L sided facial droop. Sensory intact. Able to raise eyebrows.    LABS:                        10.6   6.22  )-----------( 145      ( 23 Nov 2017 06:21 )             34.9     11-23    138  |  102  |  11  ----------------------------<  77  4.3   |  24  |  0.81    Ca    7.9<L>      23 Nov 2017 06:21  Phos  2.4     11-23  Mg     1.9     11-23      PT/INR - ( 23 Nov 2017 06:21 )   PT: 12.8 SEC;   INR: 1.14          PTT - ( 22 Nov 2017 05:10 )  PTT:29.9 SEC          RADIOLOGY & ADDITIONAL TESTS:    Carotid dopplers 11/22:   Summary/Impressions:  Minimal heterogenous plaque noted within the proximal  right and left internal carotid arteries, consistent with  1-15% stenoses.  No hemodynamically significant stenoses  noted.    GI note reviewed

## 2017-11-24 NOTE — PROGRESS NOTE ADULT - SUBJECTIVE AND OBJECTIVE BOX
Neurology Follow up note    Patient is a 89y old  Female who presents with a chief complaint of CVA (20 Nov 2017 19:30)    Subjective: Interval History - No events overnight. Passed MBS, started on diet.     Objective:   Vital Signs Last 24 Hrs  T(C): 36.8 (24 Nov 2017 05:37), Max: 37.5 (23 Nov 2017 15:35)  T(F): 98.2 (24 Nov 2017 05:37), Max: 99.5 (23 Nov 2017 15:35)  HR: 57 (24 Nov 2017 05:37) (57 - 70)  BP: 154/64 (24 Nov 2017 05:37) (147/77 - 160/68)  BP(mean): --  RR: 18 (24 Nov 2017 05:37) (18 - 18)  SpO2: 99% (24 Nov 2017 05:37) (98% - 100%)      General Exam:   General appearance: No acute distress                   Neurological Exam:  Mental Status: Orientated to self, date and place.  Attention intact. Moderate -severe dysarthria, speech fluent. Follows simple and complex commands. Eye opening apraxia.    Cranial Nerves:  PERRL, right gaze deviation, possible left homonymous hemianopsia, no nystagmus.     CN V1-3 intact to light touch.  Dense left facial droop.  Hearing intact bilaterally.  Tongue midline.     Motor: Increased tone in LUE and LLE. RUE 5/5 no drift. LUE antigravity proximally, distally 2/5, 0/5  . RLE able to hold up against gravity 5-/5, LLE 5-/5             Coord: unable to test due to vision field cut.    Tremor: No resting, postural or action tremor.  No myoclonus.    Sensation: intact to light touch, with extinction on the left and left side visual and spacial neglect.     Gait: deferred    Other:    11-22    138  |  103  |  16  ----------------------------<  70  4.4   |  22  |  0.88    Ca    8.4      22 Nov 2017 05:10  Phos  2.8     11-22  Mg     1.7     11-22    TPro  6.6  /  Alb  3.6  /  TBili  0.7  /  DBili  x   /  AST  31  /  ALT  17  /  AlkPhos  96  11-21 11-22    138  |  103  |  16  ----------------------------<  70  4.4   |  22  |  0.88    Ca    8.4      22 Nov 2017 05:10  Phos  2.8     11-22  Mg     1.7     11-22    TPro  6.6  /  Alb  3.6  /  TBili  0.7  /  DBili  x   /  AST  31  /  ALT  17  /  AlkPhos  96  11-21    LIVER FUNCTIONS - ( 21 Nov 2017 02:00 )  Alb: 3.6 g/dL / Pro: 6.6 g/dL / ALK PHOS: 96 u/L / ALT: 17 u/L / AST: 31 u/L / GGT: x                                 11.8   6.41  )-----------( 140      ( 22 Nov 2017 05:10 )             36.5     Radiology    < from: CT Head No Cont (11.21.17 @ 14:55) >  There is a new right MCA infarct of the anterior and middle right   temporal lobe. There is no intracranial hemorrhage. There is no   significant mass effect on the ventricles or basal ganglia. There is no   hydrocephalus or midline shift.    TTE 11/21: < from: Transthoracic Echocardiogram (11.21.17 @ 15:47) >  1. Mitral annular calcification, otherwise normal mitral  valve. Mild-moderate mitral regurgitation.  2. Calcified trileaflet aortic valve with normal opening.  Mild-moderate aortic regurgitation.  3. Mildly dilated leftatrium.  LA volume index = 40 cc/m2.  4. Moderate concentric left ventricular hypertrophy.  5. Mild global left ventricular systolic dysfunction.  6. Normal right ventricular size and function.        MEDICATIONS  (STANDING):  aspirin Suppository 300 milliGRAM(s) Rectal daily  heparin  Injectable 5000 Unit(s) SubCutaneous every 8 hours  lactated ringers. 1000 milliLiter(s) (50 mL/Hr) IV Continuous <Continuous>  pantoprazole  Injectable 40 milliGRAM(s) IV Push daily    MEDICATIONS  (PRN):  acetaminophen   Tablet 650 milliGRAM(s) Oral every 6 hours PRN For Temp greater than 38 C (100.4 F)  acetaminophen   Tablet. 650 milliGRAM(s) Oral every 6 hours PRN Mild Pain (1 - 3)

## 2017-11-24 NOTE — PROGRESS NOTE ADULT - PROBLEM SELECTOR PLAN 1
- new right MCA infarct s/p TPA in ED   - neurology workupappreciated  - TTE negative for LV or Atrial thrombus  - consider palliative eval to discuss GOC with family. - new right MCA infarct s/p TPA in ED   - neurology workupappreciated  - TTE negative for LV or Atrial thrombus  - given the patients history of a life threatening gi bleed during her hospital stay in [place] and inability to undergo endoscopic evaluation d/t her cardiac comorbidities she would be at a moderate to high risk for rebleeding if anticoagulation were to be restarted  - Video Capsule EGD pending  - recommend to have palliative/hospice evaluation re: SHERRI

## 2017-11-24 NOTE — PROGRESS NOTE ADULT - PROBLEM SELECTOR PLAN 9
Extensive discussion with daughter at bedside  I talked about risks/benefits of blood thinners- risk of stroke vs bleed. GIven history of previous GIB wile on ASA/Lovenox 40 mg/prednisone requiring 3 units of PRBC and the fact that site of bleeding unknown as pt was unable to get endoscopy, her risk of bleeding is moderate to high. pt also with high risk of CVA in setting of Paroxsymal GIB. Daughters understand and will decide on AC in the next 2-3 days.  Face to face Time spent - 40 mins

## 2017-11-24 NOTE — PROGRESS NOTE ADULT - PROBLEM SELECTOR PLAN 4
documented on previous admission  Tele- NSR  Neuro recommends AC if pt fails ss, however GIB risk as above

## 2017-11-24 NOTE — SWALLOW VFSS/MBS ASSESSMENT ADULT - COMMENTS
The patient is a 89 y/o female who presented with L sided weakness; s/p TPA in ED and soon after the IV line infiltrated, subsequently d/c'd. CT Brain 11/19/17 showed new right MCA infarct, no intracranial hemorrhage. The patient was seen for an initial clinical evaluation of swallow function on 11/22/17; please see chart for full report.    The patient was received in the radiology suite accompanied by her daughter this AM. The patient was drowsy yet responsive to verbal/tactile stimuli to awake/alert state. Eyes generally remained closed throughout this study. Respiratory pattern appeared comfortable on room air. Hoarse/harsh vocal quality observed to persist upon sparse speech output. The patient is a 89 y/o female who presented with L sided weakness; s/p TPA in ED and soon after the IV line infiltrated, subsequently d/c'd. CT Brain 11/19/17 showed new right MCA infarct, no intracranial hemorrhage. The patient was seen for an initial clinical evaluation of swallow function on 11/22/17; please see chart for full report.     The patient was received in the radiology suite accompanied by her daughter this AM. The patient was drowsy yet responsive to verbal/tactile stimuli to awake/alert state. Eyes generally remained closed throughout this study. Respiratory pattern appeared comfortable on room air. Hoarse/harsh vocal quality observed to persist upon sparse speech output. Intermittent throat clearing was observed at baseline.

## 2017-11-24 NOTE — PROGRESS NOTE ADULT - PROBLEM SELECTOR PLAN 4
documented on previous admission  Tele- NSR  Neuro recommends AC if pt fails ss, however GIB risk as above  GI plans for Video capsule

## 2017-11-24 NOTE — PROGRESS NOTE ADULT - ASSESSMENT
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   Pt received IV tPA in the ED. She was noted to have some improvement in the neurological deficits after IV tPA. CT brain subsequently showed R MCA distribution embolic looking stroke.     Impression:  Cerebral embolism with cerebral infarction. Right MCA distribution stroke - likely etiology being cardioembolism, probably related to paroxysmal atrial fibrillation and not being on therapeutic anticoagulation at the time of stroke     Plan:   - Pt seen by gastroenterology as documented as moderate to high risk for GI bleed if restarted on anticoagulation. No Endoscopy performed in the past due to increased procedural risk. Will plan to do capsule study, possibly today.   - Family would like cardiology to consult on patient to clarify diagnosis of Afib. On review of records from [facility] where patient had last stroke, it is documented that patient went into paroxysmal Afib, therefore patient has diagnosis of Afib. Considering [id number] score=5, she would benefit from therapeutic anticoagulation for secondary stroke prevention once cleared by GI team due to history of GI bleed in the past. Risk vs benefits of anticoagulation discussed with her daughter at bedside in detail. Consider starting therapeutic anticoagulation once able to pass the swallowing evaluation   - No indications to add aspirin to therapeutic anticoagulation unless she has a history of CAD or cardiac stents   - Heparin SQ for DVT prophylaxis in the interim  - Atorvastatin 80 mg at bedtime once able to pass the swallow evaluation or stable enteral access is established  - Carotid Ultrasound to evaluate for carotid vasculature  - No indications for performing MRI at this time as it would not likely change the management  - TTE did not show any evidence of significant valvular heart disease nor showed any evidence of structural cardiac source of embolism   - HbA1c 5.4,   -OT for LUE splint evaluation  - Sleep hygiene and Bed in chair mode or OOB to chair as much as possible to maintain sleep-wake cycle and reduce disorientation and improve mobility.  - PT/OT/speech and swallow evaluation    Above mentioned plan was discussed with patient and available family members at bedside in detail. All the questions were answered and concerns were addressed. 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  Pt received IV tPA in the ED. She was noted to have some improvement in the neurological deficits after IV tPA. CT brain subsequently showed R MCA distribution embolic looking stroke. Carotid duplex did not show any hemodynamically significant carotid stenoses. Transthoracic echocardiogram did not show any significant valvular heart disease nor should any evidence of structural cardiac source of embolism.    Impression:  Cerebral embolism with cerebral infarction. Right MCA distribution stroke - likely etiology being cardioembolism, probably related to paroxysmal atrial fibrillation and not being on therapeutic anticoagulation at the time of stroke     Plan:   - Pt seen by gastroenterology as documented as moderate to high risk for GI bleed if restarted on anticoagulation. No Endoscopy performed in the past due to increased procedural risk. Will plan to do capsule study, possibly today.   - Family would like cardiology to consult on patient to clarify diagnosis of Afib. On review of records from [facility] where patient had last stroke, it is documented that patient went into paroxysmal Afib, therefore patient has diagnosis of Afib. Considering [id number] score=5, she would benefit from therapeutic anticoagulation for secondary stroke prevention once cleared by GI team due to history of GI bleed in the past. Risk vs benefits of anticoagulation discussed with her daughter at bedside in detail. Consider starting therapeutic anticoagulation for secondary stroke preventiononce able to pass the swallowing evaluation and once cleared by GI team    - No indications to add aspirin to therapeutic anticoagulation unless she has a history of CAD or cardiac stents. Continue with aspirin for secondary stroke prevention until the decision regarding therapeutic anticoagulation is taken by family members  - Heparin SQ for DVT prophylaxis in the interim  - Atorvastatin 80 mg at bedtime,    - No indications for performing MRI at this time as it would not likely change the management  - HbA1c 5.4  - OT for LUE splint evaluation  - Sleep hygiene and Bed in chair mode or OOB to chair as much as possible to maintain sleep-wake cycle and reduce disorientation and improve mobility.  - PT/OT/speech evaluation    Above mentioned plan was discussed with patient and available family members at bedside in detail. All the questions were answered and concerns were addressed.

## 2017-11-24 NOTE — SWALLOW VFSS/MBS ASSESSMENT ADULT - DIAGNOSTIC IMPRESSIONS
1) The patient demonstrates a Moderate Oral Dysphagia characterized by delayed bolus collection, manipulation and anterior-posterior transport with lingual pumping across trials of puree, honey-thick and nectar-thick liquids. Inadequate mastication for soft solids resulted in patient expelling bolus anteriorly from oral cavity.  2) The patient demonstrates a Moderate to Severe Pharyngeal Dysphagia characterized by delayed swallow trigger (with the bolus head at the level of the valleculae for puree, and reaching to the pyriform sinus for honey-thick and nectar-thick liquids), decreased base of tongue retraction, reduced laryngeal elevation with incomplete airway closure, and decreased pharyngeal constriction. There was mild to moderate residue in the valleculae, pyriform sinus and along the posterior pharyngeal wall post swallow with puree; liquid wash was shown to partially clear pharyngeal residue. There was aspiration during the swallow with nectar-thick liquids leaving trace barium coating along the posterior tracheal wall. Patient with weak cough response indicating she is sensate to the aspiration. There was deep laryngeal penetration during the swallow with full retrieval for honey-thick liquids administered via cup sip. There was trace laryngeal penetration during the swallow with full retrieval for puree and honey-thick liquids administered via teaspoon. It should be noted intermittent throat clearing was observed at baseline and persisted throughout trials despite the absence of barium contrast in the airway.  3) It should be noted there was intermittent backflow of the bolus observed within the proximal esophagus, suggestive of an esophageal dysphagia component. 1) The patient demonstrates a Moderate Oral Dysphagia characterized by delayed bolus collection, manipulation and anterior-posterior transport with lingual pumping across trials of puree, honey-thick and nectar-thick liquids. Inadequate mastication for soft solids resulted in patient expelling bolus anteriorly from oral cavity.  2) The patient demonstrates a Moderate to Severe Pharyngeal Dysphagia characterized by delayed swallow trigger (with the bolus head at the level of the valleculae for puree, and reaching to the pyriform sinus for honey-thick and nectar-thick liquids), decreased base of tongue retraction, reduced laryngeal elevation with incomplete airway closure, and decreased pharyngeal constriction. There was mild to moderate residue in the valleculae, pyriform sinus and along the posterior pharyngeal wall post swallow with puree; liquid wash was shown to partially clear pharyngeal residue. There was aspiration during the swallow with nectar-thick liquids leaving trace barium coating along the posterior tracheal wall. Patient with weak cough response indicating she is sensate to the aspiration. There was deep laryngeal penetration during the swallow with full retrieval for honey-thick liquids administered via cup sip. There was trace laryngeal penetration during the swallow with full retrieval for puree and honey-thick liquids administered via teaspoon. It should be noted intermittent throat clearing was observed at baseline and persisted throughout trials despite the absence of barium contrast in the airway.  3) It should be noted there was intermittent backflow of the bolus observed within the proximal esophagus, suggestive of an esophageal dysphagia component.  4) PO intake may be guarded at this time as patient required coaxing to accept PO trials. 1) The patient demonstrates a Moderate Oral Dysphagia characterized by delayed bolus collection, manipulation and anterior-posterior transport with lingual pumping across trials of puree, honey-thick and nectar-thick liquids. Inadequate mastication for soft solids resulted in patient expelling bolus anteriorly from oral cavity.  2) The patient demonstrates a Moderate to Severe Pharyngeal Dysphagia characterized by delayed swallow trigger (with the bolus head at the level of the valleculae for puree, and reaching to the pyriform sinus for honey-thick and nectar-thick liquids), decreased base of tongue retraction, reduced laryngeal elevation with incomplete airway closure, and decreased pharyngeal constriction. There was mild to moderate residue in the valleculae, pyriform sinus and along the posterior pharyngeal wall post swallow with puree; liquid wash was shown to partially clear pharyngeal residue. There was aspiration during the swallow with nectar-thick liquids leaving trace barium coating along the posterior tracheal wall. There was deep laryngeal penetration contacting the vocal cords during the swallow with complete retrieval for honey-thick liquids administered via cup sip. Patient with immediate reflexive cough indicating she is sensate to the aspiration and penetration. There was trace laryngeal penetration during the swallow with full retrieval for puree and honey-thick liquids administered via teaspoon. It should be noted intermittent throat clearing was observed at baseline and persisted throughout trials, even during periods of no barium contrast in the airway.  3) It should be noted there was intermittent backflow of the bolus observed within the proximal esophagus, suggestive of an esophageal dysphagia component.  4) Patient deemed candidate for modified texture at this time, however PO intake may be guarded given patient required consistent coaxing to increased acceptance of PO trials during this study. 1) Moderate Oral Dysphagia characterized by delayed bolus collection, manipulation and anterior-posterior transport with lingual pumping across trials of puree, honey-thick and nectar-thick liquids. Inadequate mastication for soft solids resulted in patient expelling bolus anteriorly from oral cavity.  2) Moderate to Severe Pharyngeal Dysphagia characterized by delayed swallow trigger, decreased base of tongue retraction, reduced laryngeal elevation with incomplete airway closure, and decreased pharyngeal constriction. There was mild residue in the valleculae and pyriform sinus post swallow with puree; liquid wash was shown to assist in clearing pharyngeal residue. There was aspiration during the swallow with nectar-thick liquids leaving trace barium coating along the posterior tracheal wall post swallow. There was inconsistent deep laryngeal penetration contacting the vocal cords during the swallow with complete retrieval for honey-thick liquids administered via cup sip. Patient with immediate reflexive cough indicating she is sensate to the aspiration/penetration. There was trace laryngeal penetration during the swallow with consistent/complete retrieval for puree and honey-thick liquids administered via teaspoon. It should be noted intermittent throat clearing was observed at baseline and persisted throughout trials, even during periods of no barium contrast in the airway.  3) It should be noted there was intermittent backflow of the bolus observed within the proximal esophagus, suggestive of an esophageal dysphagia component.  4) Pt deemed candidate for modified texture diet with strict adherence to dysphagia precautions listed below, however given pt required consistent coaxing to accept trials, PO intake may be guarded at this time.

## 2017-11-24 NOTE — SWALLOW VFSS/MBS ASSESSMENT ADULT - ROSENBEK'S PENETRATION ASPIRATION SCALE
(2) contrast enters airway, remains above the vocal cords, no residue remains (penetration) (4) contrast contacts vocal cords, no residue remains (penetration) (7) contrast passes glottis, visible subglottic residue remains despite patient’s response (aspiration)

## 2017-11-24 NOTE — SWALLOW VFSS/MBS ASSESSMENT ADULT - RECOMMENDED CONSISTENCY
1) Puree with Honey-Thick Liquids via Teaspoon Only  2) Aspiration Precautions  3) Dysphagia Precautions: upright position 1) Puree with Honey-Thick Liquids via Teaspoon Only  2) Aspiration Precautions  3) Dysphagia Precautions: Feed when fully awake, alert and receptive to oral intake; Upright position (90 degrees) during/after eating for 30 minutes; Feed slowly; Liquids via Teaspoon Only; Alternate puree/honey-thick.  4) Nutrition consult as daily caloric intake may be guarded at this time.  5) MD to reconsult if concern regarding change in status and/or when overall medical status improves to determine candidacy for further diet advancement. 1) Puree with Honey-Thick Liquids via TEASPOON ONLY  3) Dysphagia Precautions: Feed when fully awake, alert and receptive to oral intake; Upright position (90 degrees) during/after eating for 30 minutes; Feed slowly; Liquids via Teaspoon Only; Alternate puree/honey-thick.  4) Nutrition consult as daily caloric intake may be guarded at this time.  5) MD to reconsult if concern regarding change in status and/or when overall medical status improves to determine candidacy for further diet advancement. 1) Puree with Honey-Thick Liquids via TEASPOON ONLY  3) Dysphagia Precautions: Feed when fully awake, alert and receptive to oral intake; Upright position (90 degrees) during/after eating for 30 minutes; Feed slowly via teaspoon only; Alternate puree and honey-thick liquid.  4) Nutrition consult as daily caloric intake may be guarded at this time.  5) MD to reconsult if concern regarding change in status and/or when overall medical status improves to determine candidacy for further diet advancement.

## 2017-11-24 NOTE — PROGRESS NOTE ADULT - PROBLEM SELECTOR PLAN 3
- pt has been off a/c since 12/2016 secondary to gib   - given the patients history of a life threatening gi bleed during her hospital stay in [place] and inability to undergo endoscopic evaluation d/t her cardiac comorbidities she would be at a moderate to high risk for rebleeding if anticoagulation were to be restarted  - will consider Video Capsule Endoscopy  - pt is on ASA 300mg at present and since Hgb is beginning to trickle down, as such pt is still considered to be moderate to high risk for rebleed  - recommend to have palliative evaluation re: GOC with patients daughters - pt has been off a/c since 12/2016 secondary to gib   - given the patients history of a life threatening gi bleed during her hospital stay in [place] and inability to undergo endoscopic evaluation d/t her cardiac comorbidities she would be at a moderate to high risk for rebleeding if anticoagulation were to be restarted  - pt is on ASA 300mg at present and since Hgb is beginning to trickle down, as such pt is still considered to be moderate to high risk for rebleed  - Keep NPO for Video Capsule Endoscopy

## 2017-11-24 NOTE — CHART NOTE - NSCHARTNOTEFT_GEN_A_CORE
Spoke with cardiology ([id number]) as patient's family was requesting a cardiology consult to clarify diagnosis of paroxysmal Afib. Records in [place] from December 2016 indicate that patient has PAF. As per cardiology, given h/o PAF and recent stroke patient should be on anticoagulation if cleared by GI and cardiology consult is not indicated at this time.

## 2017-11-24 NOTE — PROGRESS NOTE ADULT - SUBJECTIVE AND OBJECTIVE BOX
Patient is a 89y old  Female who presents with a chief complaint of CVA (22 Nov 2017 09:17)      SUBJECTIVE / OVERNIGHT EVENTS:Pt seen and examined by me Daughter at bedside  Walked with PT/OT this AM   mental status much improving. Knows she is in the hospital, the year 2017  no new complaints         MEDICATIONS  (STANDING):  aspirin Suppository 300 milliGRAM(s) Rectal daily  dextrose 5% + sodium chloride 0.9%. 1000 milliLiter(s) (50 mL/Hr) IV Continuous <Continuous>  heparin  Injectable 5000 Unit(s) SubCutaneous every 8 hours  pantoprazole  Injectable 40 milliGRAM(s) IV Push daily    MEDICATIONS  (PRN):  acetaminophen   Tablet 650 milliGRAM(s) Oral every 6 hours PRN For Temp greater than 38 C (100.4 F)  acetaminophen   Tablet. 650 milliGRAM(s) Oral every 6 hours PRN Mild Pain (1 - 3)        Vital Signs Last 24 Hrs  T(C): 36.8 (24 Nov 2017 05:37), Max: 37.5 (23 Nov 2017 15:35)  T(F): 98.2 (24 Nov 2017 05:37), Max: 99.5 (23 Nov 2017 15:35)  HR: 57 (24 Nov 2017 05:37) (57 - 70)  BP: 154/64 (24 Nov 2017 05:37) (147/77 - 161/77)  BP(mean): --  RR: 18 (24 Nov 2017 05:37) (18 - 18)  SpO2: 99% (24 Nov 2017 05:37) (98% - 100%)    CAPILLARY BLOOD GLUCOSE    CAPILLARY BLOOD GLUCOSE          I&O's Summary      PHYSICAL EXAM:  General: NAD  HEENT: L sided facial droop. Rightward gaze preference. dry mucous membranes  Lymph Nodes: no cervical lymphadenopathy  Neck: supple, no JVD  Respiratory: CTA b/l no wheezes, rales, ronchi. sufficient respiratory effort  Cardiovascular: +s1/s2, no murmurs, rubs, gallops  Abdomen: soft, nontender, nondistended  Extremities: 2+ pulses in distal extremities b/l  Skin: warm, dry, non cyanosis, clubbing, edema  Neurological: AAOx 3  L sided weakness 4/5 on LUE and LLE. Patient has right sided gaze preference, dysarthria. L sided facial droop. Sensory intact. Able to raise eyebrows.    LABS:                                              10.6   6.22  )-----------( 145      ( 23 Nov 2017 06:21 )             34.9       11-23    138  |  102  |  11  ----------------------------<  77  4.3   |  24  |  0.81    Ca    7.9<L>      23 Nov 2017 06:21  Phos  2.4     11-23  Mg     1.9     11-23        RADIOLOGY & ADDITIONAL TESTS:    Carotid dopplers 11/22:   Summary/Impressions:  Minimal heterogenous plaque noted within the proximal  right and left internal carotid arteries, consistent with  1-15% stenoses.  No hemodynamically significant stenoses  noted.    GI note reviewed

## 2017-11-24 NOTE — CHART NOTE - NSCHARTNOTEFT_GEN_A_CORE
Brief GI Fellow Note    Went to drop video capsule endoscopy per GI team.    Evaluated patient at bedside with daughter present.  Patient quite slow to swallow nectar thickened water.   Daughter, nurse, and myself concerned that patient will not be able to swallow a capsule of this size currently.  Daughter stated she would not like to proceed and will decide early next week whether she wants GI to attempt again.    Did not place capsule. GI team will reassess next week.  Nurse aware.  Diet as per primary team.    Please call with questions

## 2017-11-25 LAB
BASOPHILS # BLD AUTO: 0.03 K/UL — SIGNIFICANT CHANGE UP (ref 0–0.2)
BASOPHILS NFR BLD AUTO: 0.6 % — SIGNIFICANT CHANGE UP (ref 0–2)
BUN SERPL-MCNC: 10 MG/DL — SIGNIFICANT CHANGE UP (ref 7–23)
CALCIUM SERPL-MCNC: 7.9 MG/DL — LOW (ref 8.4–10.5)
CHLORIDE SERPL-SCNC: 107 MMOL/L — SIGNIFICANT CHANGE UP (ref 98–107)
CO2 SERPL-SCNC: 22 MMOL/L — SIGNIFICANT CHANGE UP (ref 22–31)
CREAT SERPL-MCNC: 0.87 MG/DL — SIGNIFICANT CHANGE UP (ref 0.5–1.3)
EOSINOPHIL # BLD AUTO: 0.09 K/UL — SIGNIFICANT CHANGE UP (ref 0–0.5)
EOSINOPHIL NFR BLD AUTO: 1.9 % — SIGNIFICANT CHANGE UP (ref 0–6)
GLUCOSE SERPL-MCNC: 95 MG/DL — SIGNIFICANT CHANGE UP (ref 70–99)
HCT VFR BLD CALC: 35.8 % — SIGNIFICANT CHANGE UP (ref 34.5–45)
HGB BLD-MCNC: 11.1 G/DL — LOW (ref 11.5–15.5)
IMM GRANULOCYTES # BLD AUTO: 0.05 # — SIGNIFICANT CHANGE UP
IMM GRANULOCYTES NFR BLD AUTO: 1.1 % — SIGNIFICANT CHANGE UP (ref 0–1.5)
LYMPHOCYTES # BLD AUTO: 1.15 K/UL — SIGNIFICANT CHANGE UP (ref 1–3.3)
LYMPHOCYTES # BLD AUTO: 24.2 % — SIGNIFICANT CHANGE UP (ref 13–44)
MAGNESIUM SERPL-MCNC: 1.7 MG/DL — SIGNIFICANT CHANGE UP (ref 1.6–2.6)
MCHC RBC-ENTMCNC: 30.3 PG — SIGNIFICANT CHANGE UP (ref 27–34)
MCHC RBC-ENTMCNC: 31 % — LOW (ref 32–36)
MCV RBC AUTO: 97.8 FL — SIGNIFICANT CHANGE UP (ref 80–100)
MONOCYTES # BLD AUTO: 0.75 K/UL — SIGNIFICANT CHANGE UP (ref 0–0.9)
MONOCYTES NFR BLD AUTO: 15.8 % — HIGH (ref 2–14)
NEUTROPHILS # BLD AUTO: 2.68 K/UL — SIGNIFICANT CHANGE UP (ref 1.8–7.4)
NEUTROPHILS NFR BLD AUTO: 56.4 % — SIGNIFICANT CHANGE UP (ref 43–77)
NRBC # FLD: 0 — SIGNIFICANT CHANGE UP
PLATELET # BLD AUTO: 150 K/UL — SIGNIFICANT CHANGE UP (ref 150–400)
PMV BLD: 9.3 FL — SIGNIFICANT CHANGE UP (ref 7–13)
POTASSIUM SERPL-MCNC: 4.1 MMOL/L — SIGNIFICANT CHANGE UP (ref 3.5–5.3)
POTASSIUM SERPL-SCNC: 4.1 MMOL/L — SIGNIFICANT CHANGE UP (ref 3.5–5.3)
RBC # BLD: 3.66 M/UL — LOW (ref 3.8–5.2)
RBC # FLD: 15.1 % — HIGH (ref 10.3–14.5)
SODIUM SERPL-SCNC: 140 MMOL/L — SIGNIFICANT CHANGE UP (ref 135–145)
WBC # BLD: 4.75 K/UL — SIGNIFICANT CHANGE UP (ref 3.8–10.5)
WBC # FLD AUTO: 4.75 K/UL — SIGNIFICANT CHANGE UP (ref 3.8–10.5)

## 2017-11-25 PROCEDURE — 99233 SBSQ HOSP IP/OBS HIGH 50: CPT

## 2017-11-25 RX ADMIN — HEPARIN SODIUM 5000 UNIT(S): 5000 INJECTION INTRAVENOUS; SUBCUTANEOUS at 06:07

## 2017-11-25 RX ADMIN — HEPARIN SODIUM 5000 UNIT(S): 5000 INJECTION INTRAVENOUS; SUBCUTANEOUS at 17:17

## 2017-11-25 RX ADMIN — Medication 300 MILLIGRAM(S): at 11:21

## 2017-11-25 RX ADMIN — SODIUM CHLORIDE 50 MILLILITER(S): 9 INJECTION, SOLUTION INTRAVENOUS at 11:34

## 2017-11-25 RX ADMIN — PANTOPRAZOLE SODIUM 40 MILLIGRAM(S): 20 TABLET, DELAYED RELEASE ORAL at 11:21

## 2017-11-25 NOTE — CONSULT NOTE ADULT - ATTENDING COMMENTS
Patient seen and examined. Agree with above assessment and plan. Patient with likely PAF and 2 strokes with history of gi bleed.  Would recommend AC from cardiac perspective given above findings  GI workup pending  Family to decide

## 2017-11-25 NOTE — PROGRESS NOTE ADULT - PROBLEM SELECTOR PLAN 1
likely Acute CVA with aphasia, and left hemiparesis s/p TPA  s/p MICU   Appreciate Neurology consult- Pt with left hemiparesis, right gaze deviation with left field cut, left sided neglect and extinction consistent with a RMCA stroke which is confirmed on repeat  CT head This was most likely an embolic event, suspect cardioembolism although not confirmed but pt at increased risk for Afib due to age and dilated left atrium   Neuro recommendations -Gradual normotension as patient now outside of 24hr window.  Aspirin daily and DVT ppx    Atorvastatin 80 mg at bedtime  Carotid Ultrasound: no significant stenosis  No indications for performing MRI at this time as it would not likely change the management  TTE negative for LV or Atrial thrombus.  HbA1c 5.4,   DW Neuro -Pt would benefit from AC for CVA prevention due to hx P Afib, however has hx life threatening GIB, not a candidate for EGD due to risk of anesthesia.  Risks and benefits of AC discussed with family.  Plan for capsule endoscopy before starting AC

## 2017-11-25 NOTE — PROGRESS NOTE ADULT - PROBLEM SELECTOR PLAN 3
- pt has been off a/c since 12/2016 secondary to gib   - given the patients history of a life threatening gi bleed during her hospital stay in [place] and inability to undergo endoscopic evaluation d/t her cardiac comorbidities she would be at a moderate to high risk for rebleeding if anticoagulation were to be restarted  - pt is on ASA 300mg at present and since Hgb is beginning to trickle down, as such pt is still considered to be moderate to high risk for rebleed  - Keep NPO for Video Capsule Endoscopy

## 2017-11-25 NOTE — CONSULT NOTE ADULT - ASSESSMENT
89F with PMH of depression, osteoarthritis, rheumatoid arthritis, TIA and stroke in 2016, p/w RT MCA infarct. Cardiology called to evaluate for anticoagulation risk/benefit. CHADSVASC is 5.  HASBLED score is 5. Spoke with daughter at the bedside. She is reluctant to allow heparin gtt for anticoagulation and would like to pursue the GI workup first. It is unclear if this patient has paroxysmal  atrial fibrillation, very possible, but she has already had 2 strokes and anticoagulation would benefit her as stroke risk. However, if the family is comfortable with initiating anticoagulation after the GI work-up, would recommend eliquis for anticoagulation. We will closely follow this patient. Plan discussed with .

## 2017-11-25 NOTE — PROGRESS NOTE ADULT - SUBJECTIVE AND OBJECTIVE BOX
Patient is a 89y old  Female who presents with a chief complaint of CVA (22 Nov 2017 09:17)      SUBJECTIVE / OVERNIGHT EVENTS: Unable to swallow capsule for endoscopy    MEDICATIONS  (STANDING):  aspirin Suppository 300 milliGRAM(s) Rectal daily  dextrose 5% + sodium chloride 0.9%. 1000 milliLiter(s) (50 mL/Hr) IV Continuous <Continuous>  heparin  Injectable 5000 Unit(s) SubCutaneous every 12 hours  pantoprazole  Injectable 40 milliGRAM(s) IV Push daily    MEDICATIONS  (PRN):  acetaminophen   Tablet 650 milliGRAM(s) Oral every 6 hours PRN For Temp greater than 38 C (100.4 F)  acetaminophen   Tablet. 650 milliGRAM(s) Oral every 6 hours PRN Mild Pain (1 - 3)      Vital Signs Last 24 Hrs  T(C): 36.4 (25 Nov 2017 13:07), Max: 36.8 (24 Nov 2017 22:35)  T(F): 97.6 (25 Nov 2017 13:07), Max: 98.2 (24 Nov 2017 22:35)  HR: 64 (25 Nov 2017 13:07) (64 - 69)  BP: 149/70 (25 Nov 2017 13:07) (146/68 - 163/78)  BP(mean): --  RR: 18 (25 Nov 2017 13:07) (18 - 18)  SpO2: 96% (25 Nov 2017 13:07) (96% - 100%)  CAPILLARY BLOOD GLUCOSE        I&O's Summary      PHYSICAL EXAM:  General: NAD  HEENT: L sided facial droop. Rightward gaze preference. dry mucous membranes  Lymph Nodes: no cervical lymphadenopathy  Neck: supple, no JVD  Respiratory: CTA b/l no wheezes, rales, ronchi. sufficient respiratory effort  Cardiovascular: +s1/s2, no murmurs, rubs, gallops  Abdomen: soft, nontender, nondistended  Extremities: 2+ pulses in distal extremities b/l  Skin: warm, dry, non cyanosis, clubbing, edema  Neurological: AAOx 3  L sided weakness 4/5 on LUE and LLE. Patient has right sided gaze preference, dysarthria. L sided facial droop. Sensory intact. Able to raise eyebrows.  LABS:                        11.1   4.75  )-----------( 150      ( 25 Nov 2017 04:25 )             35.8     11-25    140  |  107  |  10  ----------------------------<  95  4.1   |  22  |  0.87    Ca    7.9<L>      25 Nov 2017 04:25  Mg     1.7     11-25    run of possible afib on 11/22 noted to tele            RADIOLOGY & ADDITIONAL TESTS:    Imaging Personally Reviewed:    Consultant(s) Notes Reviewed:  GI    Discussed with daughter at bedside

## 2017-11-25 NOTE — PROGRESS NOTE ADULT - PROBLEM SELECTOR PLAN 1
- new right MCA infarct s/p TPA in ED   - neurology workup appreciated  - TTE negative for LV or Atrial thrombus  - given the patients history of a life threatening gi bleed during her hospital stay in [place] and inability to undergo endoscopic evaluation d/t her cardiac comorbidities she would be at a moderate to high risk for rebleeding if anticoagulation were to be restarted  - currently has no overt/occult signs of GI bleeding   - Video Capsule EGD not performed yesterday as patient did not want to attempt swallowing due to size. Daughter and patient wish to wait until swallowing improves further to attempt capsule endoscopy.   - recommend to have palliative/hospice evaluation re: GOC

## 2017-11-25 NOTE — CONSULT NOTE ADULT - SUBJECTIVE AND OBJECTIVE BOX
Date of Admission:  11/20/17  CHIEF COMPLAINT:  Presented with left sided weakness.   HISTORY OF PRESENT ILLNESS:    Patient is a 89 year old female with a PMH of depression, osteoarthritis, rheumatoid arthritis, TIA and stroke in 2016.  She was hospitalized for stroke last year and was placed on blood thinners, ASA and heparin gtt. Patient had a massive GI bleed requiring several transfusions and opted not to continue anticoagulation. There is a question of whether this patient has paroxysmal atrial fibrillation that may be the cause of the first stroke. There is no evidence on this admission of atrial fibrillation. Today, patient presented with left facial droop. A CT head was done in ER and she was found to have RT MCA infarct. Neurology/Gastroenteroly/ and cardiology have been consulted regarding the risk and benefit of initiating anticoagulation given the massive GI bleed she had in the past and the new stroke. Patient is a 89 year old that is bed bound and has dementia with poor speech ever since her first stroke last year. Her daughter is at the bedside and is very involved in her care, and wants to do what is right for her mother. She believes her mother has a good quality of life and does not want her mother to suffer from another stroke, but was traumatized by the previous GI bleed. GI has discussed doing a capsule endoscopy on this admission to evaluate if she is at risk of another bleed. Neurology would like to risk/benefit of starting anticoagulation, and also would like input on eliquis vs. coumadin. Patient is currently not on heparin gtt, is only receiving heparin sq for VTE prophylaxis. She is taking a baby aspirin.  Allergies    ACE inhibitors (Angioedema)  morphine (Unknown)    Intolerances    caffeine (Stomach Upset)  lactose (Other)  	    MEDICATIONS:  heparin  Injectable 5000 Unit(s) SubCutaneous every 12 hours  acetaminophen   Tablet 650 milliGRAM(s) Oral every 6 hours PRN  acetaminophen   Tablet. 650 milliGRAM(s) Oral every 6 hours PRN  aspirin Suppository 300 milliGRAM(s) Rectal daily  pantoprazole  Injectable 40 milliGRAM(s) IV Push daily  dextrose 5% + sodium chloride 0.9%. 1000 milliLiter(s) IV Continuous <Continuous>      PAST MEDICAL & SURGICAL HISTORY:  Depression  Osteoarthritis  TIA (transient ischemic attack)  Rheumatoid arthritis, adult  S/P appendectomy  Status post total knee replacement: bilateral  History of cholecystectomy      FAMILY HISTORY:  No pertinent family history in first degree relatives      SOCIAL HISTORY:    [ ] Non-smoker  [ ] Smoker  [ ] Alcohol      REVIEW OF SYSTEMS:  See HPI. Otherwise, 10 point ROS done and otherwise negative.    PHYSICAL EXAM:  T(C): 36.4 (11-25-17 @ 13:07), Max: 36.8 (11-24-17 @ 22:35)  HR: 64 (11-25-17 @ 13:07) (64 - 69)  BP: 149/70 (11-25-17 @ 13:07) (146/68 - 155/90)  RR: 18 (11-25-17 @ 13:07) (18 - 18)  SpO2: 96% (11-25-17 @ 13:07) (96% - 100%)  Wt(kg): --  I&O's Summary    Physical Exam:  GENERAL:  no distress  HEENT:  NC/AT  CHEST:  CTA bilaterally  HEART:  Regular rhythm, S1, S2, no murmur  ABDOMEN:  Soft, non-tender, non-distended, normoactive bowel sounds,   EXTEREMITIES:  no cyanosis,clubbing or edema, left hip pressure ulcer  SKIN:  No rash/erythema/ecchymoses/petechiae/wounds/abscess/warm/dry  NEURO:  LUE weakness          LABS:	 	    CBC Full  -  ( 25 Nov 2017 04:25 )  WBC Count : 4.75 K/uL  Hemoglobin : 11.1 g/dL  Hematocrit : 35.8 %  Platelet Count - Automated : 150 K/uL  Mean Cell Volume : 97.8 fL  Mean Cell Hemoglobin : 30.3 pg  Mean Cell Hemoglobin Concentration : 31.0 %  Auto Neutrophil # : 2.68 K/uL  Auto Lymphocyte # : 1.15 K/uL  Auto Monocyte # : 0.75 K/uL  Auto Eosinophil # : 0.09 K/uL  Auto Basophil # : 0.03 K/uL  Auto Neutrophil % : 56.4 %  Auto Lymphocyte % : 24.2 %  Auto Monocyte % : 15.8 %  Auto Eosinophil % : 1.9 %  Auto Basophil % : 0.6 %    11-25    140  |  107  |  10  ----------------------------<  95  4.1   |  22  |  0.87    Ca    7.9<L>      25 Nov 2017 04:25  Mg     1.7     11-25      TELEMETRY: 	first degree AV block    ECG:  	Sinus rhythm

## 2017-11-25 NOTE — PROGRESS NOTE ADULT - SUBJECTIVE AND OBJECTIVE BOX
INTERVAL HPI/OVERNIGHT EVENTS:    tolerating PO intake   daughter at bedside -- patient did not attempt swallowing capsule endoscopy due to size    HPI:  89F hx of PUD, osteoarthritis, rheumatoid arthritis, prior CVA nov 2016 with residual speech changes, b/l knee replacements p/w left sided weakness. Patient lives at home with daughter but has a home health aid that assists with ADLs. Patient went to bathroom at 11:30 and soon after cleaning up, was suddenly unable to lift her left leg/arm. Home health aid also noticed a left sided facial droop and that the patient was unable to speak, although was alert with her eyes open and bobbing her head. The home health aid immediately called daughter for help and patient was brought into ED. At her baseline, she is able to ambulate with a walker, bathe, eat, and perform other ADLs with assistance from the home health aid. Daughter reports that she has been otherwise well recently and has no complaints of recent illness, cough, fever, nausea, vomiting, diarrhea, weakness, no other complaints. Daughter states that patient was admitted to the ICU 1 year ago for a CVA and was put on blood thinners that resulted in her having a massive GI bleed. Blood thinners were discontinued at that time and patient has not been on anticoagulation since. Patient was attending physical therapy from Dec 2016 to February 2017. Daughter also endorses that her mother also had a transient episode of AFib during her last admission but has not been treated for it. (20 Nov 2017 19:30)    MEDICATIONS  (STANDING):  aspirin Suppository 300 milliGRAM(s) Rectal daily  dextrose 5% + sodium chloride 0.9%. 1000 milliLiter(s) (50 mL/Hr) IV Continuous <Continuous>  heparin  Injectable 5000 Unit(s) SubCutaneous every 12 hours  pantoprazole  Injectable 40 milliGRAM(s) IV Push daily    MEDICATIONS  (PRN):  acetaminophen   Tablet 650 milliGRAM(s) Oral every 6 hours PRN For Temp greater than 38 C (100.4 F)  acetaminophen   Tablet. 650 milliGRAM(s) Oral every 6 hours PRN Mild Pain (1 - 3)      Allergies    ACE inhibitors (Angioedema)  morphine (Unknown)    Intolerances    caffeine (Stomach Upset)  lactose (Other)        General:  No wt loss, fevers, chills, night sweats, fatigue,   Eyes:  Good vision, no reported pain  ENT: dysphagia  CV:  No pain, palpitations, hypo/hypertension  Resp:  No dyspnea, cough, tachypnea, wheezing  GI:  No pain, No nausea, No vomiting, No diarrhea, No constipation, No weight loss, No fever, No pruritis, No rectal bleeding, No tarry stools, No dysphagia,  :  No pain, bleeding, incontinence, nocturia  Muscle:  No pain, weakness  Neuro:  No weakness, tingling, memory problems  Psych:  No fatigue, insomnia, mood problems, depression  Endocrine:  No polyuria, polydipsia, cold/heat intolerance  Heme:  No petechiae, ecchymosis, easy bruisability  Skin:  No rash, tattoos, scars, edema      PHYSICAL EXAM:   Vital Signs:  Vital Signs Last 24 Hrs  T(C): 36.6 (25 Nov 2017 06:05), Max: 37.2 (24 Nov 2017 14:39)  T(F): 97.9 (25 Nov 2017 06:05), Max: 99 (24 Nov 2017 14:39)  HR: 69 (25 Nov 2017 06:05) (68 - 76)  BP: 146/68 (25 Nov 2017 06:05) (142/68 - 163/78)  BP(mean): --  RR: 18 (25 Nov 2017 06:05) (18 - 18)  SpO2: 100% (25 Nov 2017 06:05) (100% - 100%)  Daily     Daily I&O's Summary      GENERAL:  Appears stated age, well-groomed, well-nourished, no distress  HEENT:  NC/AT,  conjunctivae clear and pink, no thyromegaly, nodules, adenopathy, no JVD, sclera -anicteric  CHEST:  Full & symmetric excursion, no increased effort, breath sounds clear  HEART:  Regular rhythm, S1, S2, no murmur/rub/S3/S4, no abdominal bruit, no edema  ABDOMEN:  Soft, non-tender, non-distended, normoactive bowel sounds,  no masses ,no hepato-splenomegaly, no signs of chronic liver disease  EXTEREMITIES:  no cyanosis,clubbing or edema, left hip pressure ulcer  SKIN:  No rash/erythema/ecchymoses/petechiae/wounds/abscess/warm/dry  NEURO:  LUE weakness      LABS:                        11.1   4.75  )-----------( 150      ( 25 Nov 2017 04:25 )             35.8     11-25    140  |  107  |  10  ----------------------------<  95  4.1   |  22  |  0.87    Ca    7.9<L>      25 Nov 2017 04:25  Mg     1.7     11-25          amylase   lipase  RADIOLOGY & ADDITIONAL TESTS:

## 2017-11-25 NOTE — PROGRESS NOTE ADULT - PROBLEM SELECTOR PLAN 4
documented on previous admission, cardiology consult to review tele, discuss with family  Tele- NSR with one run possible afib on 11/22

## 2017-11-26 PROCEDURE — 99233 SBSQ HOSP IP/OBS HIGH 50: CPT

## 2017-11-26 RX ADMIN — HEPARIN SODIUM 5000 UNIT(S): 5000 INJECTION INTRAVENOUS; SUBCUTANEOUS at 06:37

## 2017-11-26 RX ADMIN — HEPARIN SODIUM 5000 UNIT(S): 5000 INJECTION INTRAVENOUS; SUBCUTANEOUS at 18:13

## 2017-11-26 RX ADMIN — SODIUM CHLORIDE 50 MILLILITER(S): 9 INJECTION, SOLUTION INTRAVENOUS at 17:54

## 2017-11-26 RX ADMIN — PANTOPRAZOLE SODIUM 40 MILLIGRAM(S): 20 TABLET, DELAYED RELEASE ORAL at 13:23

## 2017-11-26 RX ADMIN — Medication 300 MILLIGRAM(S): at 18:57

## 2017-11-26 NOTE — PROGRESS NOTE ADULT - SUBJECTIVE AND OBJECTIVE BOX
Patient is a 89y old  Female who presents with a chief complaint of CVA (22 Nov 2017 09:17)      SUBJECTIVE / OVERNIGHT EVENTS: No events    MEDICATIONS  (STANDING):  aspirin Suppository 300 milliGRAM(s) Rectal daily  dextrose 5% + sodium chloride 0.9%. 1000 milliLiter(s) (50 mL/Hr) IV Continuous <Continuous>  heparin  Injectable 5000 Unit(s) SubCutaneous every 12 hours  pantoprazole  Injectable 40 milliGRAM(s) IV Push daily    MEDICATIONS  (PRN):  acetaminophen   Tablet 650 milliGRAM(s) Oral every 6 hours PRN For Temp greater than 38 C (100.4 F)  acetaminophen   Tablet. 650 milliGRAM(s) Oral every 6 hours PRN Mild Pain (1 - 3)      Vital Signs Last 24 Hrs  T(C): 37.1 (26 Nov 2017 06:34), Max: 37.2 (25 Nov 2017 22:54)  T(F): 98.8 (26 Nov 2017 06:34), Max: 98.9 (25 Nov 2017 22:54)  HR: 64 (26 Nov 2017 06:34) (64 - 99)  BP: 138/54 (26 Nov 2017 06:34) (138/54 - 154/82)  BP(mean): --  RR: 16 (26 Nov 2017 06:34) (16 - 17)  SpO2: 100% (26 Nov 2017 06:34) (100% - 100%)  CAPILLARY BLOOD GLUCOSE        I&O's Summary      PHYSICAL EXAM:  General: NAD  HEENT: L sided facial droop. Rightward gaze preference. dry mucous membranes  Lymph Nodes: no cervical lymphadenopathy  Neck: supple, no JVD  Respiratory: CTA b/l no wheezes, rales, ronchi. sufficient respiratory effort  Cardiovascular: +s1/s2, no murmurs, rubs, gallops  Abdomen: soft, nontender, nondistended  Extremities: 2+ pulses in distal extremities b/l  Skin: warm, dry, non cyanosis, clubbing, edema  Neurological: AAOx 3  L sided weakness 4/5 on LUE and LLE. Patient has right sided gaze preference, dysarthria. L sided facial droop. Sensory intact. Able to raise eyebrows.      LABS:                        11.1   4.75  )-----------( 150      ( 25 Nov 2017 04:25 )             35.8     11-25    140  |  107  |  10  ----------------------------<  95  4.1   |  22  |  0.87    Ca    7.9<L>      25 Nov 2017 04:25  Mg     1.7     11-25      Consultant(s) Notes Reviewed:  GI, cards

## 2017-11-26 NOTE — PROGRESS NOTE ADULT - PROBLEM SELECTOR PLAN 3
- pt has been off a/c since 12/2016 secondary to gib   - given the patients history of a life threatening gi bleed during her hospital stay in [place] and inability to undergo endoscopic evaluation d/t her cardiac comorbidities she would be at a moderate to high risk for rebleeding if anticoagulation were to be restarted  - pt is on ASA 300mg at present and since Hgb is beginning to trickle down, as such pt is still considered to be moderate to high risk for rebleed  - Keep NPO for Video Capsule Endoscopy

## 2017-11-26 NOTE — PROGRESS NOTE ADULT - SUBJECTIVE AND OBJECTIVE BOX
INTERVAL HPI/OVERNIGHT EVENTS:  tolerating po intake  daughter at bedside - would like to re-evaluate patient tomorrow for capsule endoscopy    HPI:  89F hx of PUD, osteoarthritis, rheumatoid arthritis, prior CVA nov 2016 with residual speech changes, b/l knee replacements p/w left sided weakness. Patient lives at home with daughter but has a home health aid that assists with ADLs. Patient went to bathroom at 11:30 and soon after cleaning up, was suddenly unable to lift her left leg/arm. Home health aid also noticed a left sided facial droop and that the patient was unable to speak, although was alert with her eyes open and bobbing her head. The home health aid immediately called daughter for help and patient was brought into ED. At her baseline, she is able to ambulate with a walker, bathe, eat, and perform other ADLs with assistance from the home health aid. Daughter reports that she has been otherwise well recently and has no complaints of recent illness, cough, fever, nausea, vomiting, diarrhea, weakness, no other complaints. Daughter states that patient was admitted to the ICU 1 year ago for a CVA and was put on blood thinners that resulted in her having a massive GI bleed. Blood thinners were discontinued at that time and patient has not been on anticoagulation since. Patient was attending physical therapy from Dec 2016 to February 2017. Daughter also endorses that her mother also had a transient episode of AFib during her last admission but has not been treated for it. (20 Nov 2017 19:30)    MEDICATIONS  (STANDING):  aspirin Suppository 300 milliGRAM(s) Rectal daily  dextrose 5% + sodium chloride 0.9%. 1000 milliLiter(s) (50 mL/Hr) IV Continuous <Continuous>  heparin  Injectable 5000 Unit(s) SubCutaneous every 12 hours  pantoprazole  Injectable 40 milliGRAM(s) IV Push daily    MEDICATIONS  (PRN):  acetaminophen   Tablet 650 milliGRAM(s) Oral every 6 hours PRN For Temp greater than 38 C (100.4 F)  acetaminophen   Tablet. 650 milliGRAM(s) Oral every 6 hours PRN Mild Pain (1 - 3)      Allergies    ACE inhibitors (Angioedema)  morphine (Unknown)    Intolerances    caffeine (Stomach Upset)  lactose (Other)        General:  No wt loss, fevers, chills, night sweats, fatigue,   Eyes:  Good vision, no reported pain  ENT:  No sore throat, pain, runny nose, dysphagia  CV:  No pain, palpitations, hypo/hypertension  Resp:  No dyspnea, cough, tachypnea, wheezing  GI:  No pain, No nausea, No vomiting, No diarrhea, No constipation, No weight loss, No fever, No pruritis, No rectal bleeding, No tarry stools, No dysphagia,  :  No pain, bleeding, incontinence, nocturia  Muscle:  No pain, weakness  Neuro: left sided weakness  Psych:  No fatigue, insomnia, mood problems, depression  Endocrine:  No polyuria, polydipsia, cold/heat intolerance  Heme:  No petechiae, ecchymosis, easy bruisability  Skin:  No rash, tattoos, scars, edema      PHYSICAL EXAM:   Vital Signs:  Vital Signs Last 24 Hrs  T(C): 37.1 (26 Nov 2017 06:34), Max: 37.2 (25 Nov 2017 22:54)  T(F): 98.8 (26 Nov 2017 06:34), Max: 98.9 (25 Nov 2017 22:54)  HR: 64 (26 Nov 2017 06:34) (64 - 99)  BP: 138/54 (26 Nov 2017 06:34) (138/54 - 154/82)  BP(mean): --  RR: 16 (26 Nov 2017 06:34) (16 - 18)  SpO2: 100% (26 Nov 2017 06:34) (96% - 100%)  Daily     Daily I&O's Summary      GENERAL:  Appears stated age, well-groomed, well-nourished, no distress  HEENT:  NC/AT,  conjunctivae clear and pink, no thyromegaly, nodules, adenopathy, no JVD, sclera -anicteric  CHEST:  Full & symmetric excursion, no increased effort, breath sounds clear  HEART:  Regular rhythm, S1, S2, no murmur/rub/S3/S4, no abdominal bruit, no edema  ABDOMEN:  Soft, non-tender, non-distended, normoactive bowel sounds,  no masses ,no hepato-splenomegaly, no signs of chronic liver disease  EXTEREMITIES:  no cyanosis,clubbing or edema  SKIN:  No rash/erythema/ecchymoses/petechiae/wounds/abscess/warm/dry  NEURO: left facial droop, left sided weakness      LABS:                        11.1   4.75  )-----------( 150      ( 25 Nov 2017 04:25 )             35.8     11-25    140  |  107  |  10  ----------------------------<  95  4.1   |  22  |  0.87    Ca    7.9<L>      25 Nov 2017 04:25  Mg     1.7     11-25          amylase   lipase  RADIOLOGY & ADDITIONAL TESTS:

## 2017-11-26 NOTE — PROGRESS NOTE ADULT - PROBLEM SELECTOR PLAN 1
- new right MCA infarct s/p TPA in ED   - neurology workup appreciated  - TTE negative for LV or Atrial thrombus  - given the patients history of a life threatening gi bleed during her hospital stay in [place] and inability to undergo endoscopic evaluation d/t her cardiac comorbidities she would be at a moderate to high risk for rebleeding if anticoagulation were to be restarted  - currently has no overt/occult signs of GI bleeding   - Video Capsule EGD not performed yesterday as patient did not want to attempt swallowing due to size. Daughter and patient wish to wait until swallowing improves further to attempt capsule endoscopy.   - recommend to have palliative/hospice evaluation re: GOC

## 2017-11-27 LAB
BASOPHILS # BLD AUTO: 0.03 K/UL — SIGNIFICANT CHANGE UP (ref 0–0.2)
BASOPHILS NFR BLD AUTO: 0.4 % — SIGNIFICANT CHANGE UP (ref 0–2)
BUN SERPL-MCNC: 14 MG/DL — SIGNIFICANT CHANGE UP (ref 7–23)
CALCIUM SERPL-MCNC: 7.5 MG/DL — LOW (ref 8.4–10.5)
CHLORIDE SERPL-SCNC: 110 MMOL/L — HIGH (ref 98–107)
CO2 SERPL-SCNC: 21 MMOL/L — LOW (ref 22–31)
CREAT SERPL-MCNC: 0.85 MG/DL — SIGNIFICANT CHANGE UP (ref 0.5–1.3)
EOSINOPHIL # BLD AUTO: 0.13 K/UL — SIGNIFICANT CHANGE UP (ref 0–0.5)
EOSINOPHIL NFR BLD AUTO: 1.9 % — SIGNIFICANT CHANGE UP (ref 0–6)
GLUCOSE SERPL-MCNC: 80 MG/DL — SIGNIFICANT CHANGE UP (ref 70–99)
HCT VFR BLD CALC: 33.3 % — LOW (ref 34.5–45)
HGB BLD-MCNC: 10.2 G/DL — LOW (ref 11.5–15.5)
IMM GRANULOCYTES # BLD AUTO: 0.05 # — SIGNIFICANT CHANGE UP
IMM GRANULOCYTES NFR BLD AUTO: 0.7 % — SIGNIFICANT CHANGE UP (ref 0–1.5)
LYMPHOCYTES # BLD AUTO: 1.34 K/UL — SIGNIFICANT CHANGE UP (ref 1–3.3)
LYMPHOCYTES # BLD AUTO: 19.9 % — SIGNIFICANT CHANGE UP (ref 13–44)
MAGNESIUM SERPL-MCNC: 1.8 MG/DL — SIGNIFICANT CHANGE UP (ref 1.6–2.6)
MCHC RBC-ENTMCNC: 30.4 PG — SIGNIFICANT CHANGE UP (ref 27–34)
MCHC RBC-ENTMCNC: 30.6 % — LOW (ref 32–36)
MCV RBC AUTO: 99.1 FL — SIGNIFICANT CHANGE UP (ref 80–100)
MONOCYTES # BLD AUTO: 0.85 K/UL — SIGNIFICANT CHANGE UP (ref 0–0.9)
MONOCYTES NFR BLD AUTO: 12.6 % — SIGNIFICANT CHANGE UP (ref 2–14)
NEUTROPHILS # BLD AUTO: 4.32 K/UL — SIGNIFICANT CHANGE UP (ref 1.8–7.4)
NEUTROPHILS NFR BLD AUTO: 64.5 % — SIGNIFICANT CHANGE UP (ref 43–77)
NRBC # FLD: 0 — SIGNIFICANT CHANGE UP
PLATELET # BLD AUTO: 165 K/UL — SIGNIFICANT CHANGE UP (ref 150–400)
PMV BLD: 10.3 FL — SIGNIFICANT CHANGE UP (ref 7–13)
POTASSIUM SERPL-MCNC: 3.9 MMOL/L — SIGNIFICANT CHANGE UP (ref 3.5–5.3)
POTASSIUM SERPL-SCNC: 3.9 MMOL/L — SIGNIFICANT CHANGE UP (ref 3.5–5.3)
RBC # BLD: 3.36 M/UL — LOW (ref 3.8–5.2)
RBC # FLD: 14.9 % — HIGH (ref 10.3–14.5)
SODIUM SERPL-SCNC: 143 MMOL/L — SIGNIFICANT CHANGE UP (ref 135–145)
WBC # BLD: 6.72 K/UL — SIGNIFICANT CHANGE UP (ref 3.8–10.5)
WBC # FLD AUTO: 6.72 K/UL — SIGNIFICANT CHANGE UP (ref 3.8–10.5)

## 2017-11-27 PROCEDURE — 99233 SBSQ HOSP IP/OBS HIGH 50: CPT

## 2017-11-27 PROCEDURE — 71010: CPT | Mod: 26

## 2017-11-27 RX ADMIN — Medication 300 MILLIGRAM(S): at 13:12

## 2017-11-27 RX ADMIN — Medication 650 MILLIGRAM(S): at 21:46

## 2017-11-27 RX ADMIN — SODIUM CHLORIDE 50 MILLILITER(S): 9 INJECTION, SOLUTION INTRAVENOUS at 06:29

## 2017-11-27 RX ADMIN — SODIUM CHLORIDE 50 MILLILITER(S): 9 INJECTION, SOLUTION INTRAVENOUS at 21:46

## 2017-11-27 RX ADMIN — PANTOPRAZOLE SODIUM 40 MILLIGRAM(S): 20 TABLET, DELAYED RELEASE ORAL at 13:12

## 2017-11-27 RX ADMIN — HEPARIN SODIUM 5000 UNIT(S): 5000 INJECTION INTRAVENOUS; SUBCUTANEOUS at 06:28

## 2017-11-27 RX ADMIN — HEPARIN SODIUM 5000 UNIT(S): 5000 INJECTION INTRAVENOUS; SUBCUTANEOUS at 18:27

## 2017-11-27 RX ADMIN — SODIUM CHLORIDE 50 MILLILITER(S): 9 INJECTION, SOLUTION INTRAVENOUS at 13:14

## 2017-11-27 NOTE — PROGRESS NOTE ADULT - SUBJECTIVE AND OBJECTIVE BOX
INTERVAL HPI/OVERNIGHT EVENTS:    pt with no abdominal complaints  no episodes of bloody stools noted over the weekend  daughter bedside caring for mom    MEDICATIONS  (STANDING):  aspirin Suppository 300 milliGRAM(s) Rectal daily  dextrose 5% + sodium chloride 0.9%. 1000 milliLiter(s) (50 mL/Hr) IV Continuous <Continuous>  heparin  Injectable 5000 Unit(s) SubCutaneous every 12 hours  pantoprazole  Injectable 40 milliGRAM(s) IV Push daily    MEDICATIONS  (PRN):  acetaminophen   Tablet 650 milliGRAM(s) Oral every 6 hours PRN For Temp greater than 38 C (100.4 F)  acetaminophen   Tablet. 650 milliGRAM(s) Oral every 6 hours PRN Mild Pain (1 - 3)      Allergies    ACE inhibitors (Angioedema)  morphine (Unknown)    Intolerances    caffeine (Stomach Upset)  lactose (Other)      Review of Systems:    General:  No wt loss, fevers, chills, night sweats, fatigue   Eyes:  Good vision, no reported pain  ENT:  No sore throat, pain, runny nose, dysphagia  CV:  No pain, palpitations, hypo/hypertension  Resp:  No dyspnea, cough, tachypnea, wheezing  GI:  No pain, No nausea, No vomiting, No diarrhea, No constipation, No weight loss, No fever, No pruritis, No rectal bleeding, No melena, No dysphagia  :  No pain, bleeding, incontinence, nocturia  Muscle:  No pain, weakness  Neuro:  No weakness, tingling, memory problems  Psych:  No fatigue, insomnia, mood problems, depression  Endocrine:  No polyuria, polydypsia, cold/heat intolerance  Heme:  No petechiae, ecchymosis, easy bruisability  Skin:  No rash, tattoos, scars, edema      Vital Signs Last 24 Hrs  T(C): 36.6 (27 Nov 2017 06:31), Max: 37.1 (26 Nov 2017 22:30)  T(F): 97.8 (27 Nov 2017 06:31), Max: 98.8 (26 Nov 2017 22:30)  HR: 64 (27 Nov 2017 06:31) (64 - 75)  BP: 134/56 (27 Nov 2017 06:31) (134/56 - 148/56)  BP(mean): --  RR: 16 (27 Nov 2017 06:31) (16 - 18)  SpO2: 100% (27 Nov 2017 06:31) (100% - 100%)    PHYSICAL EXAM:    Constitutional: NAD  HEENT: EOMI, throat clear  Neck: No LAD, supple  Respiratory: CTA and P  Cardiovascular: S1 and S2, RRR, no M  Gastrointestinal: BS+, soft, NT/ND, neg HSM,  Extremities: No peripheral edema, neg clubbing, cyanosis  Vascular: 2+ peripheral pulses  Neurological: A/O x 2-3, no focal deficits  Psychiatric: Normal mood, normal affect  Skin: No rashes      LABS:                        10.2   6.72  )-----------( 165      ( 27 Nov 2017 05:23 )             33.3     11-27    143  |  110<H>  |  14  ----------------------------<  80  3.9   |  21<L>  |  0.85    Ca    7.5<L>      27 Nov 2017 05:23  Mg     1.8     11-27            RADIOLOGY & ADDITIONAL TESTS:

## 2017-11-27 NOTE — PROGRESS NOTE ADULT - ASSESSMENT
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  Pt received IV tPA in the ED. She was noted to have some improvement in the neurological deficits after IV tPA. CT brain subsequently showed R MCA distribution embolic looking stroke. Carotid duplex did not show any hemodynamically significant carotid stenoses. Transthoracic echocardiogram did not show any significant valvular heart disease nor should any evidence of structural cardiac source of embolism.    Impression:  Cerebral embolism with cerebral infarction. Right MCA distribution stroke - likely etiology being cardioembolism, probably related to paroxysmal atrial fibrillation and not being on therapeutic anticoagulation at the time of stroke     Plan:   - Pt seen by gastroenterology as documented as moderate to high risk for GI bleed if restarted on anticoagulation. No Endoscopy performed in the past due to increased procedural risk. Decided against doing capsule study due to difficulty swallowing. Will discuss starting anticoagulation with the daughter. If they decide to start AC, will monitor in the hospital a few says for GI bleeding.   -Cardiology also agree with need for AC to prevent fruther strokes. Recommending Eliquis, agree with this choice.   - No indications to add aspirin to therapeutic anticoagulation unless she has a history of CAD or cardiac stents. Continue with aspirin for secondary stroke prevention until the decision regarding therapeutic anticoagulation is taken by family members  - Heparin SQ for DVT prophylaxis in the interim  - Atorvastatin 80 mg at bedtime,    - No indications for performing MRI at this time as it would not likely change the management  - HbA1c 5.4  - OT for LUE splint evaluation  - Sleep hygiene and Bed in chair mode or OOB to chair as much as possible to maintain sleep-wake cycle and reduce disorientation and improve mobility.  - PT/OT/speech evaluation 89 F hx of PUD, osteoarthritis, rheumatoid arthritis, prior CVA nov 2016 with residual speech changes and history of PAF presented to [facility] with left sided weakness and speech difficulty.  Pt received IV tPA in the ED. She was noted to have some improvement in the neurological deficits after IV tPA. CT brain subsequently showed R MCA distribution embolic looking stroke. Carotid duplex did not show any hemodynamically significant carotid stenoses. Transthoracic echocardiogram did not show any significant valvular heart disease nor showed any evidence of other structural cardiac source of embolism.    Impression:  Cerebral embolism with cerebral infarction. Right MCA distribution stroke - likely etiology being cardioembolism, probably related to paroxysmal atrial fibrillation and not being on therapeutic anticoagulation at the time of stroke     Plan:   - Pt seen by gastroenterology as documented as moderate to high risk for GI bleed if restarted on anticoagulation. No Endoscopy performed in the past due to increased procedural risk. Decided against doing capsule study due to difficulty swallowing. Will discuss starting anticoagulation with the daughter. If they decide to start AC, will monitor in the hospital a few says for GI bleeding.   -Cardiology also agree with need for AC to prevent further strokes. Recommending Eliquis, agree with this choice.   - No indications to add aspirin to therapeutic anticoagulation unless she has a history of CAD or cardiac stents. Continue with aspirin for secondary stroke prevention until the decision regarding therapeutic anticoagulation is taken by family members  - Heparin SQ for DVT prophylaxis in the interim  - Atorvastatin 80 mg at bedtime,    - No indications for performing MRI at this time as it would not likely   - HbA1c 5.4  - OT for LUE splint evaluation  - Sleep hygiene and Bed in chair mode or OOB to chair as much as possible to maintain sleep-wake cycle and reduce disorientation and improve mobility.  - PT/OT/speech evaluation

## 2017-11-27 NOTE — PROGRESS NOTE ADULT - SUBJECTIVE AND OBJECTIVE BOX
Patient is a 89y old  Female who presents with a chief complaint of CVA (22 Nov 2017 09:17)      SUBJECTIVE / OVERNIGHT EVENTS: No events    MEDICATIONS  (STANDING):  aspirin Suppository 300 milliGRAM(s) Rectal daily  dextrose 5% + sodium chloride 0.9%. 1000 milliLiter(s) (50 mL/Hr) IV Continuous <Continuous>  heparin  Injectable 5000 Unit(s) SubCutaneous every 12 hours  pantoprazole  Injectable 40 milliGRAM(s) IV Push daily    MEDICATIONS  (PRN):  acetaminophen   Tablet 650 milliGRAM(s) Oral every 6 hours PRN For Temp greater than 38 C (100.4 F)  acetaminophen   Tablet. 650 milliGRAM(s) Oral every 6 hours PRN Mild Pain (1 - 3)      Vital Signs Last 24 Hrs  T(C): 36.6 (27 Nov 2017 06:31), Max: 37.1 (26 Nov 2017 22:30)  T(F): 97.8 (27 Nov 2017 06:31), Max: 98.8 (26 Nov 2017 22:30)  HR: 64 (27 Nov 2017 06:31) (64 - 75)  BP: 134/56 (27 Nov 2017 06:31) (134/56 - 148/56)  BP(mean): --  RR: 16 (27 Nov 2017 06:31) (16 - 18)  SpO2: 100% (27 Nov 2017 06:31) (100% - 100%)    I&O's Summary      PHYSICAL EXAM:  General: NAD  HEENT: L sided facial droop. Rightward gaze preference. dry mucous membranes  Lymph Nodes: no cervical lymphadenopathy  Neck: supple, no JVD  Respiratory: CTA b/l no wheezes, rales, ronchi. sufficient respiratory effort  Cardiovascular: +s1/s2, no murmurs, rubs, gallops  Abdomen: soft, nontender, nondistended  Extremities: 2+ pulses in distal extremities b/l  Skin: warm, dry, non cyanosis, clubbing, edema  Neurological: AAOx 3  L sided weakness 4/5 on LUE and LLE. Patient has right sided gaze preference, dysarthria. L sided facial droop. Sensory intact. Able to raise eyebrows.      LABS:                                         10.2   6.72  )-----------( 165      ( 27 Nov 2017 05:23 )             33.3     11-27    143  |  110<H>  |  14  ----------------------------<  80  3.9   |  21<L>  |  0.85    Ca    7.5<L>      27 Nov 2017 05:23  Mg     1.8     11-27        Consultant(s) Notes Reviewed:  GI, cards

## 2017-11-27 NOTE — PROGRESS NOTE ADULT - PROBLEM SELECTOR PLAN 1
likely Acute CVA with aphasia, and left hemiparesis s/p TPA  s/p MICU   Appreciate Neurology consult- Pt with left hemiparesis, right gaze deviation with left field cut, left sided neglect and extinction consistent with a RMCA stroke which is confirmed on repeat  CT head This was most likely an embolic event, suspect cardioembolism although not confirmed but pt at increased risk for Afib due to age and dilated left atrium  Carotid Ultrasound: no significant stenosis   On  Aspirin daily, Atorvastatin 80 mg  and DVT ppx    No indications for performing MRI at this time as it would not likely change the management  TTE negative for LV or Atrial thrombus.  HbA1c 5.4,   DW Neuro -Pt would benefit from AC for CVA prevention due to hx P Afib, however has hx life threatening GIB, not a candidate for EGD due to risk of anesthesia.  Risks and benefits of AC discussed with family.  Plan for capsule endoscopy before starting AC

## 2017-11-27 NOTE — PROGRESS NOTE ADULT - PROBLEM SELECTOR PLAN 1
- new right MCA infarct s/p TPA in ED   - neurology workup appreciated  - TTE negative for LV or Atrial thrombus  - given the patients history of a life threatening gi bleed during her hospital stay in [place] and inability to undergo endoscopic evaluation d/t her cardiac comorbidities she would be at a moderate to high risk for rebleeding if anticoagulation were to be restarted  - occult negative (-) and has no overt/occult signs of GI bleeding   - Video Capsule EGD not performed as patient did not want to attempt swallowing due to size of the capsule  - discussed at length with daughter and patient with agreement and understanding that given negative occult and stable h/h role for endoscopic evaluation would not be beneficial and risks of undergoing anesthesia outweigh the benefits  - palliative/hospice evaluation re: SHERRI

## 2017-11-27 NOTE — PROGRESS NOTE ADULT - SUBJECTIVE AND OBJECTIVE BOX
Patient quiet, comfortable and offers no complaints. Her daughter is at the bedside. Patient was to have capsular endoscopy given her history of GI bleed and her anesthesia risk. The daughter was considering anticoagulation if there was no obvious source of potential bleeding. However, GI has decided against capsular endoscopy. They feel that she will not be able to swallow the capsule.  The daughter remains undecided about anticoagulation. I have spoken to her about continued monitoring to rule out atrial fibrillation with a  Cardionet or ILR monitor.  No decisions were made. Patient has no history of palpitations or chest pain. no recent shortness of breath.        Vital Signs Last 24 Hrs  T(C): 37.3 (27 Nov 2017 14:00), Max: 37.3 (27 Nov 2017 14:00)  T(F): 99.2 (27 Nov 2017 14:00), Max: 99.2 (27 Nov 2017 14:00)  HR: 87 (27 Nov 2017 14:00) (64 - 87)  BP: 188/76 (27 Nov 2017 14:00) (134/56 - 188/76)  BP(mean): --  RR: 18 (27 Nov 2017 14:00) (16 - 18)  SpO2: 99% (27 Nov 2017 14:00) (99% - 100%)      Telemetry:  Sinus rhythm 82 b/min with 1st degree AVB. +PVC's/APC's.    MEDICATIONS  (STANDING):  aspirin Suppository 300 milliGRAM(s) Rectal daily  dextrose 5% + sodium chloride 0.9%. 1000 milliLiter(s) (50 mL/Hr) IV Continuous <Continuous>  heparin  Injectable 5000 Unit(s) SubCutaneous every 12 hours  pantoprazole  Injectable 40 milliGRAM(s) IV Push daily    MEDICATIONS  (PRN):  acetaminophen   Tablet 650 milliGRAM(s) Oral every 6 hours PRN For Temp greater than 38 C (100.4 F)  acetaminophen   Tablet. 650 milliGRAM(s) Oral every 6 hours PRN Mild Pain (1 - 3)          Physical exam:   Gen-  Patient appears comfortable. No complaints of pain or palpitations.  Resp- Decreased effort. No adventitious sounds appreciated.   CV-  S1 and S2 normal. RRR. No murmurs.  ABD- soft Nontender +bowel sounds.  EXT- no edema or calf tenderness. Unable to move left arm/fingers but +sensation.   Neuro -  Follows simple commands. Left upper extremity with + sensation but unable to move either arm or fingers. Left leg +sensation and able to move as directed.   A&O x3. +attention intact.  + left facial  droop. Speech fluent.                            10.2   6.72  )-----------( 165      ( 27 Nov 2017 05:23 )             33.3       11-27    143  |  110<H>  |  14  ----------------------------<  80  3.9   |  21<L>  |  0.85    Ca    7.5<L>      27 Nov 2017 05:23  Mg     1.8     11-27

## 2017-11-27 NOTE — PROGRESS NOTE ADULT - PROBLEM SELECTOR PLAN 3
- pt has been off a/c since 12/2016 secondary to gib   - given the patients history of a life threatening gi bleed during her hospital stay in [place] and inability to undergo endoscopic evaluation d/t her cardiac comorbidities she would be at a moderate to high risk for rebleeding if anticoagulation were to be restarted  - discussed at length with daughter with agreement and understanding that given negative occult and stable h/h role for endoscopic evaluation would not be beneficial and risks of undergoing anesthesia outweigh the benefits

## 2017-11-27 NOTE — PROGRESS NOTE ADULT - SUBJECTIVE AND OBJECTIVE BOX
Neurology Follow up note    Patient is a 89y old  Female who presents with a chief complaint of CVA (20 Nov 2017 19:30)    Subjective: Interval History - Doing well. no new complaints.     Objective:   Vital Signs Last 24 Hrs  T(C): 36.8 (24 Nov 2017 05:37), Max: 37.5 (23 Nov 2017 15:35)  T(F): 98.2 (24 Nov 2017 05:37), Max: 99.5 (23 Nov 2017 15:35)  HR: 57 (24 Nov 2017 05:37) (57 - 70)  BP: 154/64 (24 Nov 2017 05:37) (147/77 - 160/68)  BP(mean): --  RR: 18 (24 Nov 2017 05:37) (18 - 18)  SpO2: 99% (24 Nov 2017 05:37) (98% - 100%)      General Exam:   General appearance: No acute distress , talking on her cell phone                  Neurological Exam:  Mental Status: Orientated to self, date and place.  Attention intact. Moderate -severe dysarthria, speech fluent. Follows simple and complex commands.  Eye opening apraxia improved no nystagmus.        CN V1-3 intact to light touch.  Dense left facial droop.  Hearing intact bilaterally.  Tongue midline.     Motor: Increased tone in LUE and LLE. RUE 5/5 no drift, difficulty raising arms up due to arthritis. LUE antigravity proximally, distally 2/5, 0/5  . Bilat Lower extremities able to hold up against gravity.             Coord: FNF normal on right    Tremor: No resting, postural or action tremor.  No myoclonus.    Sensation: intact to light touch, extinction and left sided neglect greatly improved.     Gait: deferred    Other:    11-22    138  |  103  |  16  ----------------------------<  70  4.4   |  22  |  0.88    Ca    8.4      22 Nov 2017 05:10  Phos  2.8     11-22  Mg     1.7     11-22    TPro  6.6  /  Alb  3.6  /  TBili  0.7  /  DBili  x   /  AST  31  /  ALT  17  /  AlkPhos  96  11-21 11-22    138  |  103  |  16  ----------------------------<  70  4.4   |  22  |  0.88    Ca    8.4      22 Nov 2017 05:10  Phos  2.8     11-22  Mg     1.7     11-22    TPro  6.6  /  Alb  3.6  /  TBili  0.7  /  DBili  x   /  AST  31  /  ALT  17  /  AlkPhos  96  11-21    LIVER FUNCTIONS - ( 21 Nov 2017 02:00 )  Alb: 3.6 g/dL / Pro: 6.6 g/dL / ALK PHOS: 96 u/L / ALT: 17 u/L / AST: 31 u/L / GGT: x                                 11.8   6.41  )-----------( 140      ( 22 Nov 2017 05:10 )             36.5     Radiology    < from: CT Head No Cont (11.21.17 @ 14:55) >  There is a new right MCA infarct of the anterior and middle right   temporal lobe. There is no intracranial hemorrhage. There is no   significant mass effect on the ventricles or basal ganglia. There is no   hydrocephalus or midline shift.    TTE 11/21: < from: Transthoracic Echocardiogram (11.21.17 @ 15:47) >  1. Mitral annular calcification, otherwise normal mitral  valve. Mild-moderate mitral regurgitation.  2. Calcified trileaflet aortic valve with normal opening.  Mild-moderate aortic regurgitation.  3. Mildly dilated leftatrium.  LA volume index = 40 cc/m2.  4. Moderate concentric left ventricular hypertrophy.  5. Mild global left ventricular systolic dysfunction.  6. Normal right ventricular size and function.        MEDICATIONS  (STANDING):  aspirin Suppository 300 milliGRAM(s) Rectal daily  heparin  Injectable 5000 Unit(s) SubCutaneous every 8 hours  lactated ringers. 1000 milliLiter(s) (50 mL/Hr) IV Continuous <Continuous>  pantoprazole  Injectable 40 milliGRAM(s) IV Push daily    MEDICATIONS  (PRN):  acetaminophen   Tablet 650 milliGRAM(s) Oral every 6 hours PRN For Temp greater than 38 C (100.4 F)  acetaminophen   Tablet. 650 milliGRAM(s) Oral every 6 hours PRN Mild Pain (1 - 3) Neurology Follow up note    Patient is a 89y old  Female who presents with a chief complaint of CVA (20 Nov 2017 19:30)    Subjective: Interval History - Doing well. no new complaints.     Objective:   Vital Signs Last 24 Hrs  T(C): 36.8 (24 Nov 2017 05:37), Max: 37.5 (23 Nov 2017 15:35)  T(F): 98.2 (24 Nov 2017 05:37), Max: 99.5 (23 Nov 2017 15:35)  HR: 57 (24 Nov 2017 05:37) (57 - 70)  BP: 154/64 (24 Nov 2017 05:37) (147/77 - 160/68)  BP(mean): --  RR: 18 (24 Nov 2017 05:37) (18 - 18)  SpO2: 99% (24 Nov 2017 05:37) (98% - 100%)      General Exam:   General appearance: No acute distress , talking on her cell phone                  Neurological Exam:  Mental Status: Orientated to self, date and place.  Attention intact. Moderate -severe dysarthria, speech fluent. Follows simple and complex commands.  Eye opening apraxia improved no nystagmus.        CN V1-3 intact to light touch.  Dense left facial droop.  Hearing intact bilaterally.  Tongue midline.     Motor: Increased tone in LUE and LLE. RUE 5/5 except deltoid .less than  3/5 probably due to rotator cuff injury, difficulty raising arms up due to arthritis. LUE 3 -/5proximally, distally 2/5, 0/5  .   right lower extremity-slight effort versus gravity; left lower extremity just slight movement, not against gravity..             Coord: FNF normal on right    Tremor: No resting, postural or action tremor.  No myoclonus.    Sensation: intact to light touch, extinction and left sided neglect greatly improved.     Gait: deferred    Other:    11-22    138  |  103  |  16  ----------------------------<  70  4.4   |  22  |  0.88    Ca    8.4      22 Nov 2017 05:10  Phos  2.8     11-22  Mg     1.7     11-22    TPro  6.6  /  Alb  3.6  /  TBili  0.7  /  DBili  x   /  AST  31  /  ALT  17  /  AlkPhos  96  11-21 11-22    138  |  103  |  16  ----------------------------<  70  4.4   |  22  |  0.88    Ca    8.4      22 Nov 2017 05:10  Phos  2.8     11-22  Mg     1.7     11-22    TPro  6.6  /  Alb  3.6  /  TBili  0.7  /  DBili  x   /  AST  31  /  ALT  17  /  AlkPhos  96  11-21    LIVER FUNCTIONS - ( 21 Nov 2017 02:00 )  Alb: 3.6 g/dL / Pro: 6.6 g/dL / ALK PHOS: 96 u/L / ALT: 17 u/L / AST: 31 u/L / GGT: x                                 11.8   6.41  )-----------( 140      ( 22 Nov 2017 05:10 )             36.5     Radiology    < from: CT Head No Cont (11.21.17 @ 14:55) >  There is a new right MCA infarct of the anterior and middle right   temporal lobe. There is no intracranial hemorrhage. There is no   significant mass effect on the ventricles or basal ganglia. There is no   hydrocephalus or midline shift.    TTE 11/21: < from: Transthoracic Echocardiogram (11.21.17 @ 15:47) >  1. Mitral annular calcification, otherwise normal mitral  valve. Mild-moderate mitral regurgitation.  2. Calcified trileaflet aortic valve with normal opening.  Mild-moderate aortic regurgitation.  3. Mildly dilated leftatrium.  LA volume index = 40 cc/m2.  4. Moderate concentric left ventricular hypertrophy.  5. Mild global left ventricular systolic dysfunction.  6. Normal right ventricular size and function.        MEDICATIONS  (STANDING):  aspirin Suppository 300 milliGRAM(s) Rectal daily  heparin  Injectable 5000 Unit(s) SubCutaneous every 8 hours  lactated ringers. 1000 milliLiter(s) (50 mL/Hr) IV Continuous <Continuous>  pantoprazole  Injectable 40 milliGRAM(s) IV Push daily    MEDICATIONS  (PRN):  acetaminophen   Tablet 650 milliGRAM(s) Oral every 6 hours PRN For Temp greater than 38 C (100.4 F)  acetaminophen   Tablet. 650 milliGRAM(s) Oral every 6 hours PRN Mild Pain (1 - 3)

## 2017-11-27 NOTE — PROGRESS NOTE ADULT - ASSESSMENT
89F with PMH of depression, osteoarthritis, rheumatoid arthritis, TIA and stroke in 2016, p/w RT MCA infarct. Cardiology called to evaluate for anticoagulation risk/benefit. CHADSVASC is 5.  HASBLED score is 5. Daughter is reluctant to allow heparin gtt for anticoagulation given no plans for GI evaluation and no EKG or telemetry evidence of atrial fibrillation.  It is possible that this patient has paroxysmal  atrial fibrillation as a cause of cardioembolic stroke and have discussed with daughter Karma a Cardionet or ILR for prolonged monitoring.  She will speak with the patient and family prior to making a decision.

## 2017-11-28 DIAGNOSIS — R50.81 FEVER PRESENTING WITH CONDITIONS CLASSIFIED ELSEWHERE: ICD-10-CM

## 2017-11-28 LAB
APPEARANCE UR: SIGNIFICANT CHANGE UP
BACTERIA # UR AUTO: SIGNIFICANT CHANGE UP
BILIRUB UR-MCNC: NEGATIVE — SIGNIFICANT CHANGE UP
BLOOD UR QL VISUAL: NEGATIVE — SIGNIFICANT CHANGE UP
COLOR SPEC: YELLOW — SIGNIFICANT CHANGE UP
GLUCOSE UR-MCNC: NEGATIVE — SIGNIFICANT CHANGE UP
KETONES UR-MCNC: NEGATIVE — SIGNIFICANT CHANGE UP
LEUKOCYTE ESTERASE UR-ACNC: HIGH
MUCOUS THREADS # UR AUTO: SIGNIFICANT CHANGE UP
NITRITE UR-MCNC: POSITIVE — HIGH
PH UR: 6 — SIGNIFICANT CHANGE UP (ref 4.6–8)
PROT UR-MCNC: 30 — HIGH
RBC CASTS # UR COMP ASSIST: SIGNIFICANT CHANGE UP (ref 0–?)
SP GR SPEC: 1.02 — SIGNIFICANT CHANGE UP (ref 1–1.03)
SPECIMEN SOURCE: SIGNIFICANT CHANGE UP
SPECIMEN SOURCE: SIGNIFICANT CHANGE UP
SQUAMOUS # UR AUTO: SIGNIFICANT CHANGE UP
UROBILINOGEN FLD QL: NORMAL E.U. — SIGNIFICANT CHANGE UP (ref 0.1–0.2)
WBC UR QL: >50 — HIGH (ref 0–?)

## 2017-11-28 PROCEDURE — 93010 ELECTROCARDIOGRAM REPORT: CPT

## 2017-11-28 PROCEDURE — 99233 SBSQ HOSP IP/OBS HIGH 50: CPT

## 2017-11-28 RX ORDER — METOPROLOL TARTRATE 50 MG
12.5 TABLET ORAL
Qty: 0 | Refills: 0 | Status: DISCONTINUED | OUTPATIENT
Start: 2017-11-28 | End: 2017-12-05

## 2017-11-28 RX ORDER — PIPERACILLIN AND TAZOBACTAM 4; .5 G/20ML; G/20ML
3.38 INJECTION, POWDER, LYOPHILIZED, FOR SOLUTION INTRAVENOUS EVERY 8 HOURS
Qty: 0 | Refills: 0 | Status: DISCONTINUED | OUTPATIENT
Start: 2017-11-28 | End: 2017-12-06

## 2017-11-28 RX ORDER — LACTOBACILLUS ACIDOPHILUS 100MM CELL
1 CAPSULE ORAL DAILY
Qty: 0 | Refills: 0 | Status: DISCONTINUED | OUTPATIENT
Start: 2017-11-28 | End: 2017-11-29

## 2017-11-28 RX ORDER — SENNA PLUS 8.6 MG/1
2 TABLET ORAL AT BEDTIME
Qty: 0 | Refills: 0 | Status: DISCONTINUED | OUTPATIENT
Start: 2017-11-28 | End: 2017-11-28

## 2017-11-28 RX ORDER — SUCRALFATE 1 G
1 TABLET ORAL
Qty: 0 | Refills: 0 | Status: DISCONTINUED | OUTPATIENT
Start: 2017-11-28 | End: 2017-11-29

## 2017-11-28 RX ORDER — PANTOPRAZOLE SODIUM 20 MG/1
40 TABLET, DELAYED RELEASE ORAL
Qty: 0 | Refills: 0 | Status: DISCONTINUED | OUTPATIENT
Start: 2017-11-28 | End: 2017-11-29

## 2017-11-28 RX ORDER — ACETAMINOPHEN 500 MG
1000 TABLET ORAL ONCE
Qty: 0 | Refills: 0 | Status: COMPLETED | OUTPATIENT
Start: 2017-11-28 | End: 2017-11-28

## 2017-11-28 RX ORDER — ASPIRIN/CALCIUM CARB/MAGNESIUM 324 MG
81 TABLET ORAL DAILY
Qty: 0 | Refills: 0 | Status: DISCONTINUED | OUTPATIENT
Start: 2017-11-28 | End: 2017-11-29

## 2017-11-28 RX ORDER — DOCUSATE SODIUM 100 MG
100 CAPSULE ORAL DAILY
Qty: 0 | Refills: 0 | Status: DISCONTINUED | OUTPATIENT
Start: 2017-11-28 | End: 2017-11-28

## 2017-11-28 RX ORDER — POLYETHYLENE GLYCOL 3350 17 G/17G
17 POWDER, FOR SOLUTION ORAL
Qty: 0 | Refills: 0 | Status: DISCONTINUED | OUTPATIENT
Start: 2017-11-28 | End: 2017-11-29

## 2017-11-28 RX ADMIN — SODIUM CHLORIDE 50 MILLILITER(S): 9 INJECTION, SOLUTION INTRAVENOUS at 08:02

## 2017-11-28 RX ADMIN — SODIUM CHLORIDE 50 MILLILITER(S): 9 INJECTION, SOLUTION INTRAVENOUS at 07:01

## 2017-11-28 RX ADMIN — Medication 81 MILLIGRAM(S): at 13:02

## 2017-11-28 RX ADMIN — Medication 400 MILLIGRAM(S): at 23:08

## 2017-11-28 RX ADMIN — HEPARIN SODIUM 5000 UNIT(S): 5000 INJECTION INTRAVENOUS; SUBCUTANEOUS at 07:01

## 2017-11-28 RX ADMIN — Medication 12.5 MILLIGRAM(S): at 17:35

## 2017-11-28 RX ADMIN — PIPERACILLIN AND TAZOBACTAM 25 GRAM(S): 4; .5 INJECTION, POWDER, LYOPHILIZED, FOR SOLUTION INTRAVENOUS at 12:59

## 2017-11-28 RX ADMIN — SODIUM CHLORIDE 50 MILLILITER(S): 9 INJECTION, SOLUTION INTRAVENOUS at 21:19

## 2017-11-28 RX ADMIN — HEPARIN SODIUM 5000 UNIT(S): 5000 INJECTION INTRAVENOUS; SUBCUTANEOUS at 17:35

## 2017-11-28 RX ADMIN — PIPERACILLIN AND TAZOBACTAM 25 GRAM(S): 4; .5 INJECTION, POWDER, LYOPHILIZED, FOR SOLUTION INTRAVENOUS at 21:17

## 2017-11-28 RX ADMIN — PIPERACILLIN AND TAZOBACTAM 25 GRAM(S): 4; .5 INJECTION, POWDER, LYOPHILIZED, FOR SOLUTION INTRAVENOUS at 07:01

## 2017-11-28 RX ADMIN — PANTOPRAZOLE SODIUM 40 MILLIGRAM(S): 20 TABLET, DELAYED RELEASE ORAL at 17:35

## 2017-11-28 RX ADMIN — Medication 1 GRAM(S): at 17:35

## 2017-11-28 NOTE — PROGRESS NOTE ADULT - PROBLEM SELECTOR PLAN 2
- passed repeat speech and swallow  - passed cine with Puree with Honey-Thick Liquids  - aspiration precautions

## 2017-11-28 NOTE — PROGRESS NOTE ADULT - SUBJECTIVE AND OBJECTIVE BOX
INTERVAL HPI/OVERNIGHT EVENTS:    febrile to 103.5 overnight, improved with tylenol  pt seen with daughter bedside feeding patient breakfast  no abdominal events  brown bm     MEDICATIONS  (STANDING):  aspirin enteric coated 81 milliGRAM(s) Oral daily  dextrose 5% + sodium chloride 0.9%. 1000 milliLiter(s) (50 mL/Hr) IV Continuous <Continuous>  heparin  Injectable 5000 Unit(s) SubCutaneous every 12 hours  pantoprazole  Injectable 40 milliGRAM(s) IV Push daily  piperacillin/tazobactam IVPB. 3.375 Gram(s) IV Intermittent every 8 hours    MEDICATIONS  (PRN):  acetaminophen   Tablet 650 milliGRAM(s) Oral every 6 hours PRN For Temp greater than 38 C (100.4 F)  acetaminophen   Tablet. 650 milliGRAM(s) Oral every 6 hours PRN Mild Pain (1 - 3)      Allergies    ACE inhibitors (Angioedema)  morphine (Unknown)    Intolerances    caffeine (Stomach Upset)  lactose (Other)      Review of Systems: *per daughter    General:  No wt loss,  chills, night sweats; + fatigue ; +fever overnight  Eyes:  Good vision, no reported pain  ENT:  No sore throat, pain, runny nose, dysphagia  CV:  No pain, palpitations, hypo/hypertension  Resp:  No dyspnea, cough, tachypnea, wheezing  GI:  No pain, No nausea, No vomiting, No diarrhea, No constipation, No weight loss, No fever, No pruritis, No rectal bleeding, No melena, No dysphagia  :  No pain, bleeding, incontinence, nocturia  Muscle:  No pain, weaknesss  Psych:  No fatigue, insomnia, mood problems, depression  Endocrine:  No polyuria, polydypsia, cold/heat intolerance  Heme:  No petechiae, ecchymosis, easy bruisability  Skin:  No rash, tattoos, scars, edema      Vital Signs Last 24 Hrs  T(C): 36.6 (28 Nov 2017 07:03), Max: 39.7 (27 Nov 2017 22:00)  T(F): 97.8 (28 Nov 2017 07:03), Max: 103.5 (27 Nov 2017 22:00)  HR: 65 (28 Nov 2017 09:04) (61 - 87)  BP: 150/80 (28 Nov 2017 09:04) (145/654 - 188/76)  BP(mean): --  RR: 14 (28 Nov 2017 09:04) (14 - 18)  SpO2: 98% (28 Nov 2017 09:04) (98% - 100%)    PHYSICAL EXAM:    Constitutional: NAD, weakened  HEENT: EOMI, throat clear  Neck: No LAD, supple  Respiratory: CTA and P  Cardiovascular: S1 and S2, RRR, no M  Gastrointestinal: BS+, soft, NT/ND, neg HSM,  Extremities: No peripheral edema, neg clubbing, cyanosis  Vascular: 2+ peripheral pulses  Neurological: A/O x 2, no focal deficits  Psychiatric: Normal mood, normal affect  Skin: No rashes      LABS:                        10.2   6.72  )-----------( 165      ( 27 Nov 2017 05:23 )             33.3     11-27    143  |  110<H>  |  14  ----------------------------<  80  3.9   |  21<L>  |  0.85    Ca    7.5<L>      27 Nov 2017 05:23  Mg     1.8     11-27            RADIOLOGY & ADDITIONAL TESTS:

## 2017-11-28 NOTE — PROGRESS NOTE ADULT - PROBLEM SELECTOR PLAN 3
- pt has been off a/c since 12/2016 secondary to gib   - given the patients history of a life threatening gi bleed during her hospital stay in [place] and inability to undergo endoscopic evaluation d/t her cardiac comorbidities she would be at a moderate to high risk for rebleeding if anticoagulation were to be restarted  - discussed at length with daughter with agreement and understanding that given negative occult and stable h/h role for endoscopic evaluation would not be beneficial and risks of undergoing anesthesia outweigh the benefits  - recommend to keep on protonix bid and carafate bid if a/c is to be started

## 2017-11-28 NOTE — PROGRESS NOTE ADULT - PROBLEM SELECTOR PLAN 1
- new right MCA infarct s/p TPA in ED   - neurology workup appreciated  - TTE negative for LV or Atrial thrombus  - given the patients history of a life threatening gi bleed during her hospital stay in [place] and inability to undergo endoscopic evaluation d/t her cardiac comorbidities she would be at a moderate to high risk for rebleeding if anticoagulation were to be restarted  - occult negative (-) and has no overt/occult signs of GI bleeding   - Video Capsule EGD not performed as patient did not want to attempt swallowing due to size of the capsule  - discussed at length with daughter and patient with agreement and understanding that given negative occult and stable h/h role for endoscopic evaluation would not be beneficial and risks of undergoing anesthesia outweigh the benefits  - recommend to keep on protonix bid and carafate bid if a/c is to be started  - recommend palliative/hospice evaluation re: GOC

## 2017-11-28 NOTE — PROGRESS NOTE ADULT - PROBLEM SELECTOR PLAN 2
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  HbA1c 5.4,   DW Neuro -Pt would benefit from AC for CVA prevention due to hx P Afib, however has hx life threatening GIB, not a candidate for EGD due to risk of anesthesia.  Risks and benefits of AC discussed with family.  Family still undecided about AC

## 2017-11-28 NOTE — PROGRESS NOTE ADULT - SUBJECTIVE AND OBJECTIVE BOX
Patient awake without complaints. Denies chest pain, shortness of breath or dizziness. Answers simple questions.    Vital Signs Last 24 Hrs  T(C): 36.6 (28 Nov 2017 07:03), Max: 39.7 (27 Nov 2017 22:00)  T(F): 97.8 (28 Nov 2017 07:03), Max: 103.5 (27 Nov 2017 22:00)  HR: 65 (28 Nov 2017 09:04) (61 - 84)  BP: 150/80 (28 Nov 2017 09:04) (145/654 - 165/85)  BP(mean): --  RR: 14 (28 Nov 2017 09:04) (14 - 18)  SpO2: 98% (28 Nov 2017 09:04) (98% - 100%)      EKG:  Atrial fibrillation with VR 132b/min.  Telemetry:  :  Paroxysmal atrial fibrillation w/RVR to 150's. Overnight had episode of sinus node dysfunction with 3.0 second pause x 2 and junctional escape   MEDICATIONS  (STANDING):  aspirin enteric coated 81 milliGRAM(s) Oral daily  dextrose 5% + sodium chloride 0.9%. 1000 milliLiter(s) (50 mL/Hr) IV Continuous <Continuous>  docusate sodium 100 milliGRAM(s) Oral daily  heparin  Injectable 5000 Unit(s) SubCutaneous every 12 hours  pantoprazole  Injectable 40 milliGRAM(s) IV Push two times a day  piperacillin/tazobactam IVPB. 3.375 Gram(s) IV Intermittent every 8 hours  senna 2 Tablet(s) Oral at bedtime  sucralfate 1 Gram(s) Oral four times a day    MEDICATIONS  (PRN):  acetaminophen   Tablet 650 milliGRAM(s) Oral every 6 hours PRN For Temp greater than 38 C (100.4 F)  acetaminophen   Tablet. 650 milliGRAM(s) Oral every 6 hours PRN Mild Pain (1 - 3)  polyethylene glycol 3350 17 Gram(s) Oral two times a day PRN Constipation          Physical exam:   Gen- no complaints. Appears comfortable  Resp- decreased breath sounds at bases. No wheezing, rales or rhonchi appreciated  CV- S1 and S2 normal.  Tachy with irregular rate and rhythm  ABD- soft obese nontender + bowel sounds  EXT- no edema.  left sided weakness.  Neuro- responsive. Answers simple questions. Residual left sided weakness.                            10.2   6.72  )-----------( 165      ( 27 Nov 2017 05:23 )             33.3       11-27    143  |  110<H>  |  14  ----------------------------<  80  3.9   |  21<L>  |  0.85    Ca    7.5<L>      27 Nov 2017 05:23  Mg     1.8     11-27

## 2017-11-28 NOTE — PROGRESS NOTE ADULT - PROBLEM SELECTOR PLAN 1
concerning for gram negative PNA   likely secondary to Aspiration PNA   Chest Xray reviewed- Bibasilar and retrocardiac opacity, greater on the left, suggesting small pleural effusions with likely associated passive atelectasis. Underlying atelectasis of other cause and/or pneumonia is not excluded.  Pt started on Zosyn/Incentive spirometry ordered/Encourage OOB to chair as tolerated   Await BC/BC

## 2017-11-28 NOTE — PROGRESS NOTE ADULT - SUBJECTIVE AND OBJECTIVE BOX
Patient is a 89y old  Female who presents with a chief complaint of CVA (22 Nov 2017 09:17)      SUBJECTIVE / OVERNIGHT EVENTS: Reviewed events- pt  with fever of 103.5 last night ,w as started on Zosyn   No fever after that   pt reports occ cough   Deneis dysuria or frequenncy    MEDICATIONS  (STANDING):  aspirin enteric coated 81 milliGRAM(s) Oral daily  dextrose 5% + sodium chloride 0.9%. 1000 milliLiter(s) (50 mL/Hr) IV Continuous <Continuous>  docusate sodium 100 milliGRAM(s) Oral daily  heparin  Injectable 5000 Unit(s) SubCutaneous every 12 hours  pantoprazole  Injectable 40 milliGRAM(s) IV Push two times a day  piperacillin/tazobactam IVPB. 3.375 Gram(s) IV Intermittent every 8 hours  senna 2 Tablet(s) Oral at bedtime  sucralfate 1 Gram(s) Oral four times a day    MEDICATIONS  (PRN):  acetaminophen   Tablet 650 milliGRAM(s) Oral every 6 hours PRN For Temp greater than 38 C (100.4 F)  acetaminophen   Tablet. 650 milliGRAM(s) Oral every 6 hours PRN Mild Pain (1 - 3)  polyethylene glycol 3350 17 Gram(s) Oral two times a day PRN Constipation    Vital Signs Last 24 Hrs  T(C): 36.6 (28 Nov 2017 07:03), Max: 39.7 (27 Nov 2017 22:00)  T(F): 97.8 (28 Nov 2017 07:03), Max: 103.5 (27 Nov 2017 22:00)  HR: 65 (28 Nov 2017 09:04) (61 - 87)  BP: 150/80 (28 Nov 2017 09:04) (145/654 - 188/76)  BP(mean): --  RR: 14 (28 Nov 2017 09:04) (14 - 18)  SpO2: 98% (28 Nov 2017 09:04) (98% - 100%)I&O's Summary      PHYSICAL EXAM:  General: NAD  HEENT: L sided facial droop. Rightward gaze preference. dry mucous membranes  Lymph Nodes: no cervical lymphadenopathy  Neck: supple, no JVD  Respiratory: CTA b/l no wheezes, rales, ronchi. sufficient respiratory effort  Cardiovascular: +s1/s2, no murmurs, rubs, gallops  Abdomen: soft, nontender, nondistended  Extremities: 2+ pulses in distal extremities b/l  Skin: warm, dry, non cyanosis, clubbing, edema  Neurological: AAOx 3  L sided weakness 2/5 on LUE and LLE. Patient has right sided gaze preference, dysarthria. L sided facial droop. Sensory intact. Able to raise eyebrows.      LABS:                                         10.2   6.72  )-----------( 165      ( 27 Nov 2017 05:23 )             33.3     11-27    143  |  110<H>  |  14  ----------------------------<  80  3.9   |  21<L>  |  0.85    Ca    7.5<L>      27 Nov 2017 05:23  Mg     1.8     11-27        Consultant(s) Notes Reviewed:  GI, cards Patient is a 89y old  Female who presents with a chief complaint of CVA (22 Nov 2017 09:17)      SUBJECTIVE / OVERNIGHT EVENTS: Reviewed events- pt  with fever of 103.5 last night ,w as started on Zosyn   No fever after that   pt reports occ cough   Deneis dysuria or frequenncy    MEDICATIONS  (STANDING):  aspirin enteric coated 81 milliGRAM(s) Oral daily  dextrose 5% + sodium chloride 0.9%. 1000 milliLiter(s) (50 mL/Hr) IV Continuous <Continuous>  docusate sodium 100 milliGRAM(s) Oral daily  heparin  Injectable 5000 Unit(s) SubCutaneous every 12 hours  pantoprazole  Injectable 40 milliGRAM(s) IV Push two times a day  piperacillin/tazobactam IVPB. 3.375 Gram(s) IV Intermittent every 8 hours  senna 2 Tablet(s) Oral at bedtime  sucralfate 1 Gram(s) Oral four times a day    MEDICATIONS  (PRN):  acetaminophen   Tablet 650 milliGRAM(s) Oral every 6 hours PRN For Temp greater than 38 C (100.4 F)  acetaminophen   Tablet. 650 milliGRAM(s) Oral every 6 hours PRN Mild Pain (1 - 3)  polyethylene glycol 3350 17 Gram(s) Oral two times a day PRN Constipation    Vital Signs Last 24 Hrs  T(C): 36.6 (28 Nov 2017 07:03), Max: 39.7 (27 Nov 2017 22:00)  T(F): 97.8 (28 Nov 2017 07:03), Max: 103.5 (27 Nov 2017 22:00)  HR: 65 (28 Nov 2017 09:04) (61 - 87)  BP: 150/80 (28 Nov 2017 09:04) (145/654 - 188/76)  BP(mean): --  RR: 14 (28 Nov 2017 09:04) (14 - 18)  SpO2: 98% (28 Nov 2017 09:04) (98% - 100%)    PHYSICAL EXAM:  General: NAD  HEENT: L sided facial droop. Rightward gaze preference. dry mucous membranes  Lymph Nodes: no cervical lymphadenopathy  Neck: supple, no JVD  Respiratory: CTA b/l no wheezes, rales, ronchi. sufficient respiratory effort  Cardiovascular: +s1/s2, no murmurs, rubs, gallops  Abdomen: soft, nontender, nondistended  Extremities: 2+ pulses in distal extremities b/l  Skin: warm, dry, non cyanosis, clubbing, edema  Neurological: AAOx 3  L sided weakness 2/5 on LUE and LLE. Patient has right sided gaze preference, dysarthria. L sided facial droop. Sensory intact. Able to raise eyebrows.      LABS:                                         10.2   6.72  )-----------( 165      ( 27 Nov 2017 05:23 )             33.3     11-27    143  |  110<H>  |  14  ----------------------------<  80  3.9   |  21<L>  |  0.85    Ca    7.5<L>      27 Nov 2017 05:23  Mg     1.8     11-27        Consultant(s) Notes Reviewed:  GI, cards

## 2017-11-28 NOTE — CHART NOTE - NSCHARTNOTEFT_GEN_A_CORE
notified by RN earlier pt with temp 103.5 (Rectal)   Pt seen, denies complaints, appears comfortable in NAD, family at bedside,  + cough, + decrease BS on Exam 2/2 to poor effort.   Tylenol, blood cx, ua, ucx, CXR ordered, aspiration precautions, Zosyn started, will c/t monitor.

## 2017-11-28 NOTE — PROGRESS NOTE ADULT - PROBLEM SELECTOR PLAN 5
documented on previous admission, cardiology consult to review tele, discuss with family  Tele- NSR with one run possible afib on 11/22  Family still undecided about AC  EP on board

## 2017-11-28 NOTE — PROGRESS NOTE ADULT - PROBLEM SELECTOR PLAN 6
pt had been on MTX, Prednisone and Leflunomide  defer resumption of DMARDs pending decision on AC  Daughters aware

## 2017-11-29 DIAGNOSIS — E87.0 HYPEROSMOLALITY AND HYPERNATREMIA: ICD-10-CM

## 2017-11-29 LAB
BUN SERPL-MCNC: 10 MG/DL — SIGNIFICANT CHANGE UP (ref 7–23)
CALCIUM SERPL-MCNC: 7.8 MG/DL — LOW (ref 8.4–10.5)
CHLORIDE SERPL-SCNC: 113 MMOL/L — HIGH (ref 98–107)
CO2 SERPL-SCNC: 21 MMOL/L — LOW (ref 22–31)
CREAT SERPL-MCNC: 0.93 MG/DL — SIGNIFICANT CHANGE UP (ref 0.5–1.3)
GLUCOSE SERPL-MCNC: 93 MG/DL — SIGNIFICANT CHANGE UP (ref 70–99)
HCT VFR BLD CALC: 31.2 % — LOW (ref 34.5–45)
HGB BLD-MCNC: 9.8 G/DL — LOW (ref 11.5–15.5)
MAGNESIUM SERPL-MCNC: 1.7 MG/DL — SIGNIFICANT CHANGE UP (ref 1.6–2.6)
MCHC RBC-ENTMCNC: 30.1 PG — SIGNIFICANT CHANGE UP (ref 27–34)
MCHC RBC-ENTMCNC: 31.4 % — LOW (ref 32–36)
MCV RBC AUTO: 95.7 FL — SIGNIFICANT CHANGE UP (ref 80–100)
NRBC # FLD: 0 — SIGNIFICANT CHANGE UP
PLATELET # BLD AUTO: 198 K/UL — SIGNIFICANT CHANGE UP (ref 150–400)
PMV BLD: 9.6 FL — SIGNIFICANT CHANGE UP (ref 7–13)
POTASSIUM SERPL-MCNC: 3.4 MMOL/L — LOW (ref 3.5–5.3)
POTASSIUM SERPL-SCNC: 3.4 MMOL/L — LOW (ref 3.5–5.3)
RBC # BLD: 3.26 M/UL — LOW (ref 3.8–5.2)
RBC # FLD: 15 % — HIGH (ref 10.3–14.5)
SODIUM SERPL-SCNC: 146 MMOL/L — HIGH (ref 135–145)
SPECIMEN SOURCE: SIGNIFICANT CHANGE UP
WBC # BLD: 4.89 K/UL — SIGNIFICANT CHANGE UP (ref 3.8–10.5)
WBC # FLD AUTO: 4.89 K/UL — SIGNIFICANT CHANGE UP (ref 3.8–10.5)

## 2017-11-29 PROCEDURE — 99233 SBSQ HOSP IP/OBS HIGH 50: CPT

## 2017-11-29 PROCEDURE — 99232 SBSQ HOSP IP/OBS MODERATE 35: CPT

## 2017-11-29 PROCEDURE — 99222 1ST HOSP IP/OBS MODERATE 55: CPT | Mod: GC

## 2017-11-29 RX ORDER — POTASSIUM CHLORIDE 20 MEQ
20 PACKET (EA) ORAL ONCE
Qty: 0 | Refills: 0 | Status: COMPLETED | OUTPATIENT
Start: 2017-11-29 | End: 2017-11-29

## 2017-11-29 RX ORDER — PANTOPRAZOLE SODIUM 20 MG/1
40 TABLET, DELAYED RELEASE ORAL DAILY
Qty: 0 | Refills: 0 | Status: DISCONTINUED | OUTPATIENT
Start: 2017-11-29 | End: 2017-11-30

## 2017-11-29 RX ORDER — POTASSIUM CHLORIDE 20 MEQ
20 PACKET (EA) ORAL
Qty: 0 | Refills: 0 | Status: DISCONTINUED | OUTPATIENT
Start: 2017-11-29 | End: 2017-11-29

## 2017-11-29 RX ORDER — SODIUM CHLORIDE 9 MG/ML
1000 INJECTION, SOLUTION INTRAVENOUS
Qty: 0 | Refills: 0 | Status: DISCONTINUED | OUTPATIENT
Start: 2017-11-29 | End: 2017-11-29

## 2017-11-29 RX ORDER — APIXABAN 2.5 MG/1
2.5 TABLET, FILM COATED ORAL EVERY 12 HOURS
Qty: 0 | Refills: 0 | Status: DISCONTINUED | OUTPATIENT
Start: 2017-11-29 | End: 2017-12-01

## 2017-11-29 RX ORDER — SODIUM CHLORIDE 9 MG/ML
1000 INJECTION, SOLUTION INTRAVENOUS
Qty: 0 | Refills: 0 | Status: DISCONTINUED | OUTPATIENT
Start: 2017-11-29 | End: 2017-11-30

## 2017-11-29 RX ORDER — LACTOBACILLUS ACIDOPHILUS 100MM CELL
1 CAPSULE ORAL
Qty: 0 | Refills: 0 | Status: DISCONTINUED | OUTPATIENT
Start: 2017-11-29 | End: 2017-12-18

## 2017-11-29 RX ADMIN — PIPERACILLIN AND TAZOBACTAM 25 GRAM(S): 4; .5 INJECTION, POWDER, LYOPHILIZED, FOR SOLUTION INTRAVENOUS at 22:26

## 2017-11-29 RX ADMIN — HEPARIN SODIUM 5000 UNIT(S): 5000 INJECTION INTRAVENOUS; SUBCUTANEOUS at 06:06

## 2017-11-29 RX ADMIN — Medication 1 TABLET(S): at 18:30

## 2017-11-29 RX ADMIN — SODIUM CHLORIDE 50 MILLILITER(S): 9 INJECTION, SOLUTION INTRAVENOUS at 06:06

## 2017-11-29 RX ADMIN — PANTOPRAZOLE SODIUM 40 MILLIGRAM(S): 20 TABLET, DELAYED RELEASE ORAL at 06:06

## 2017-11-29 RX ADMIN — Medication 1 TABLET(S): at 17:10

## 2017-11-29 RX ADMIN — APIXABAN 2.5 MILLIGRAM(S): 2.5 TABLET, FILM COATED ORAL at 18:29

## 2017-11-29 RX ADMIN — PIPERACILLIN AND TAZOBACTAM 25 GRAM(S): 4; .5 INJECTION, POWDER, LYOPHILIZED, FOR SOLUTION INTRAVENOUS at 06:06

## 2017-11-29 RX ADMIN — PIPERACILLIN AND TAZOBACTAM 25 GRAM(S): 4; .5 INJECTION, POWDER, LYOPHILIZED, FOR SOLUTION INTRAVENOUS at 14:10

## 2017-11-29 RX ADMIN — Medication 20 MILLIEQUIVALENT(S): at 18:40

## 2017-11-29 RX ADMIN — PANTOPRAZOLE SODIUM 40 MILLIGRAM(S): 20 TABLET, DELAYED RELEASE ORAL at 12:27

## 2017-11-29 NOTE — PROGRESS NOTE ADULT - SUBJECTIVE AND OBJECTIVE BOX
INTERVAL HPI/OVERNIGHT EVENTS:    pts daughter bedside reports mom with two episodes of loose brown stools late yesterday   no melena, no brbpr   tolerating po intake with assisted feeds by family       MEDICATIONS  (STANDING):  aspirin enteric coated 81 milliGRAM(s) Oral daily  dextrose 5% + sodium chloride 0.3%. 1000 milliLiter(s) (70 mL/Hr) IV Continuous <Continuous>  heparin  Injectable 5000 Unit(s) SubCutaneous every 12 hours  lactobacillus acidophilus 1 Tablet(s) Oral three times a day with meals  metoprolol     tartrate 12.5 milliGRAM(s) Oral two times a day  pantoprazole  Injectable 40 milliGRAM(s) IV Push daily  piperacillin/tazobactam IVPB. 3.375 Gram(s) IV Intermittent every 8 hours  potassium chloride    Tablet ER 20 milliEquivalent(s) Oral two times a day    MEDICATIONS  (PRN):  acetaminophen   Tablet 650 milliGRAM(s) Oral every 6 hours PRN For Temp greater than 38 C (100.4 F)  acetaminophen   Tablet. 650 milliGRAM(s) Oral every 6 hours PRN Mild Pain (1 - 3)      Allergies    ACE inhibitors (Angioedema)  morphine (Unknown)    Intolerances    caffeine (Stomach Upset)  lactose (Other)      Review of Systems: *per daughter     General:  No wt loss, fevers, chills, night sweats, fatigue   Eyes:  Good vision, no reported pain  ENT:  No sore throat, pain, runny nose, dysphagia  CV:  No pain, palpitations, hypo/hypertension  Resp:  No dyspnea, cough, tachypnea, wheezing  GI:  No pain, No nausea, No vomiting, +diarrhea, No constipation, No weight loss, No fever, No pruritis, No rectal bleeding, No melena, No dysphagia  :  No pain, bleeding, incontinence, nocturia  Muscle:  No pain, weakness  Neuro:  No weakness, tingling, memory problems  Psych:  No fatigue, insomnia, mood problems, depression  Endocrine:  No polyuria, polydypsia, cold/heat intolerance  Heme:  No petechiae, ecchymosis, easy bruisability  Skin:  No rash, tattoos, scars, edema      Vital Signs Last 24 Hrs  T(C): 36.4 (2017 06:03), Max: 39.3 (2017 23:08)  T(F): 97.6 (2017 06:03), Max: 102.8 (2017 23:08)  HR: 41 (2017 08:37) (41 - 82)  BP: 124/55 (2017 08:37) (108/51 - 176/82)  BP(mean): --  RR: 19 (2017 08:37) (18 - 19)  SpO2: 98% (2017 08:37) (97% - 100%)    PHYSICAL EXAM:    Constitutional: NAD, weakened  HEENT: EOMI, throat clear  Neck: No LAD, supple  Respiratory: CTA and P  Cardiovascular: S1 and S2, RRR, no M  Gastrointestinal: BS+, soft, NT/ND, neg HSM,  Extremities: No peripheral edema, neg clubbing, cyanosis  Vascular: 2+ peripheral pulses  Neurological: A/O x  , no focal deficits  Psychiatric: Normal mood, normal affect  Skin: No rashes      LABS:                        9.8    4.89  )-----------( 198      ( 2017 06:55 )             31.2     11-29    146<H>  |  113<H>  |  10  ----------------------------<  93  3.4<L>   |  21<L>  |  0.93    Ca    7.8<L>      2017 06:55  Mg     1.7     11-        Urinalysis Basic - ( 2017 06:50 )    Color: YELLOW / Appearance: HAZY / S.023 / pH: 6.0  Gluc: NEGATIVE / Ketone: NEGATIVE  / Bili: NEGATIVE / Urobili: NORMAL E.U.   Blood: NEGATIVE / Protein: 30 / Nitrite: POSITIVE   Leuk Esterase: LARGE / RBC: 10-25 / WBC >50   Sq Epi: FEW / Non Sq Epi: x / Bacteria: FEW        RADIOLOGY & ADDITIONAL TESTS:

## 2017-11-29 NOTE — PROGRESS NOTE ADULT - PROBLEM SELECTOR PLAN 1
- new right MCA infarct s/p TPA in ED   - neurology workup appreciated  - TTE negative for LV or Atrial thrombus  - given the patients history of a life threatening gi bleed during her hospital stay in [place] and inability to undergo endoscopic evaluation d/t her cardiac comorbidities she would be at a moderate to high risk for rebleeding if anticoagulation were to be restarted  - occult negative (-) and has no overt/occult signs of GI bleeding   - Video Capsule EGD not performed as patient did not want to attempt swallowing due to size of the capsule  - discussed at length with daughter and patient with agreement and understanding that given negative occult and stable h/h role for endoscopic evaluation would not be beneficial and risks of undergoing anesthesia outweigh the benefits  - recommend to keep on protonix qd   - recommend palliative/hospice evaluation re: SHERRI

## 2017-11-29 NOTE — CONSULT NOTE ADULT - ATTENDING COMMENTS
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     CT Head with new right MCA infarct. Received TPA in the ED with infiltrated IV during administration of TPA.     Patient developed fevers. Workup included a UA with > 50 WBC. CXR with possible pneumonia. Esophagram demonstrating aspiration. Bld cxs NGTD and urine cx with GNR >100K.    Assessment:  -Fevers possibly from aspiration PNA/pneumonitis vs UTI  -Right MCA infarct  -Left eye conjunctivitis most likely viral     Recommend:  -Continue zosyn.  -F/U bld and urine cxs.    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     CT Head with new right MCA infarct. Received TPA in the ED with infiltrated IV during administration of TPA.     Patient developed fevers. Workup included a UA with > 50 WBC. CXR with possible pneumonia. Esophagram demonstrating aspiration. Bld cxs NGTD and urine cx with GNR >100K.    Assessment:  -Fevers possibly from aspiration PNA/pneumonitis vs UTI  -Right MCA infarct  -Left eye conjunctivitis most likely viral.      Recommend:  -Continue zosyn.  -F/U bld and urine cxs.  -Contact isolation until left eye conjunctivitis resolves.    Johnnie Martinez MD  Pager [phone]  After 5pm/weekends call [phone]

## 2017-11-29 NOTE — PROGRESS NOTE ADULT - ATTENDING COMMENTS
Discussed with Daughters Karma and Tessy at bedside   They are agreeble to AC but want to start at lower dose of ELiquis 2.5 mg BID.  I explained to them the risks of a lower dose which may not be  effective as  compared with the full strength.  They understand the risks but are concerned with bleeding with full dose   Will start Eliquis  Will dc ASA/heparin as pt is being started on Eliquis Discussed with Daughters Karma and Tessy at bedside   They are agreeble to AC but want to start at lower dose of ELiquis 2.5 mg BID.  I explained to them the risks of a lower dose which may not be  effective as  compared with the full strength.  They understand the risks but are concerned with bleeding with full dose   Will start Eliquis  Will dc ASA/heparin as pt is being started on Eliquis  Time spent in ACP 40 mins

## 2017-11-29 NOTE — PROGRESS NOTE ADULT - SUBJECTIVE AND OBJECTIVE BOX
Patient is a 89y old  Female who presents with a chief complaint of CVA (22 Nov 2017 09:17)      SUBJECTIVE / OVERNIGHT EVENTS: Reviewed events- pt  with fever of 103.5 last night  pt was started on Zosyn the night before   Pt reports occ cough   Deneis dysuria or frequenncy  Tele- pt developed intermittent episodes of Afib with RVR to 150s yday. was evaluated by EP and started on Metoprolol    MEDICATIONS  (STANDING):  aspirin enteric coated 81 milliGRAM(s) Oral daily  dextrose 5% + sodium chloride 0.9%. 1000 milliLiter(s) (50 mL/Hr) IV Continuous <Continuous>  heparin  Injectable 5000 Unit(s) SubCutaneous every 12 hours  lactobacillus acidophilus 1 Tablet(s) Oral daily  metoprolol     tartrate 12.5 milliGRAM(s) Oral two times a day  pantoprazole  Injectable 40 milliGRAM(s) IV Push two times a day  piperacillin/tazobactam IVPB. 3.375 Gram(s) IV Intermittent every 8 hours  sucralfate 1 Gram(s) Oral four times a day    MEDICATIONS  (PRN):  acetaminophen   Tablet 650 milliGRAM(s) Oral every 6 hours PRN For Temp greater than 38 C (100.4 F)  acetaminophen   Tablet. 650 milliGRAM(s) Oral every 6 hours PRN Mild Pain (1 - 3)  polyethylene glycol 3350 17 Gram(s) Oral two times a day PRN Constipation    Vital Signs Last 24 Hrs  T(C): 36.4 (29 Nov 2017 06:03), Max: 39.3 (28 Nov 2017 23:08)  T(F): 97.6 (29 Nov 2017 06:03), Max: 102.8 (28 Nov 2017 23:08)  HR: 49 (29 Nov 2017 06:03) (49 - 82)  BP: 125/72 (29 Nov 2017 06:03) (108/51 - 176/82)  BP(mean): --  RR: 18 (29 Nov 2017 06:03) (18 - 18)  SpO2: 100% (29 Nov 2017 06:03) (97% - 100%)      PHYSICAL EXAM:  General: NAD  HEENT: L sided facial droop. Rightward gaze preference. dry mucous membranes  Lymph Nodes: no cervical lymphadenopathy  Neck: supple, no JVD  Respiratory: CTA b/l no wheezes, rales, ronchi. sufficient respiratory effort  Cardiovascular: +s1/s2, no murmurs, rubs, gallops  Abdomen: soft, nontender, nondistended  Extremities: 2+ pulses in distal extremities b/l  Skin: warm, dry, non cyanosis, clubbing, edema  Neurological: AAOx 3  L sided weakness 2/5 on LUE and LLE. Patient has right sided gaze preference, dysarthria. L sided facial droop. Sensory intact. Able to raise eyebrows.      LABS:                                         9.8    4.89  )-----------( 198      ( 29 Nov 2017 06:55 )             31.2     11-29    146<H>  |  113<H>  |  10  ----------------------------<  93  3.4<L>   |  21<L>  |  0.93    Ca    7.8<L>      29 Nov 2017 06:55  Mg     1.7     11-29        Consultant(s) Notes Reviewed:  GI, cards

## 2017-11-29 NOTE — PROGRESS NOTE ADULT - PROBLEM SELECTOR PLAN 1
concerning for gram negative PNA   likely secondary to Aspiration PNA  vs UTI   Chest Xray reviewed- Bibasilar and retrocardiac opacity, greater on the left, suggesting small pleural effusions with likely associated passive atelectasis. Underlying atelectasis of other cause and/or pneumonia is not excluded.  UA - positive LE with > 50 WBCs  Pt started on Zosyn/Incentive spirometry ordered/Encourage OOB to chair as tolerated    BC- NGTD  Await UC  ID consult

## 2017-11-29 NOTE — PROGRESS NOTE ADULT - PROBLEM SELECTOR PLAN 8
Heparin/protonix Discussed with daughter Karma- pt has been off Zoloft  for more than a year as per doctors recommendation.   will not restart it as pt without any acute symptoms at this time

## 2017-11-29 NOTE — PROGRESS NOTE ADULT - PROBLEM SELECTOR PLAN 7
Discussed with daughter Karma- pt has been off Zoloft  for more than a year as per doctors recommendation.   will not restart it as pt without any acute symptoms at this time pt had been on MTX, Prednisone and Leflunomide  defer resumption of DMARDs pending decision on AC  Daughters aware

## 2017-11-29 NOTE — CONSULT NOTE ADULT - CONSULT REASON
rehab goals
Fever
h/o gi bleed now with need to start a/c for recurrent CVA; dysphagia
left hemiparesis
anticoagulation risk/benefit

## 2017-11-29 NOTE — CONSULT NOTE ADULT - SUBJECTIVE AND OBJECTIVE BOX
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 Symptoms started the morning of admission. The patient's home health side noted left arm and leg weakness with a new left-sided facial droop so the patient's daughter was called and patient was brought to the ED. CT Head was obtained which was suggestive of new right MCA infarct. The patient received TPA in the ED. Unfortunately soon after administration the patient's IV infiltrated. Patient developed fever to 100.6 on . Given persistent fever started on Zosyn on .U/A on  with > 50 WBC. CXR with possible pneumonia. Last febrile to Tmax of 102.8 on the night of .     PAST MEDICAL & SURGICAL HISTORY:  Depression  Osteoarthritis  TIA (transient ischemic attack)  Rheumatoid arthritis, adult  S/P appendectomy  Status post total knee replacement: bilateral  History of cholecystectomy      Allergies  ACE inhibitors (Angioedema)  morphine (Unknown)        ANTIMICROBIALS:  piperacillin/tazobactam IVPB. 3.375 every 8 hours      OTHER MEDS: MEDICATIONS  (STANDING):  acetaminophen   Tablet 650 every 6 hours PRN  acetaminophen   Tablet. 650 every 6 hours PRN  apixaban 2.5 every 12 hours  metoprolol     tartrate 12.5 two times a day  pantoprazole  Injectable 40 daily      SOCIAL HISTORY:  [ ] etoh [ ] tobacco [ ] former smoker [ ] IVDU    FAMILY HISTORY:  No pertinent family history in first degree relatives      REVIEW OF SYSTEMS  [  ] ROS unobtainable because:    [  ] All other systems negative except as noted below:	    Constitutional:  [ ] fever [ ] weight loss  Skin:  [ ] rash [ ] phlebitis	  Eyes: [ ] icterus [ ] inflammation	  ENMT: [ ] discharge [ ] thrush [ ] ulcers [ ] exudates  Respiratory: [ ] dyspnea [ ] hemoptysis [ ] cough [ ] sputum	  Cardiovascular:  [ ] chest pain [ ] palpitations [ ] edema	  Gastrointestinal:  [ ] nausea [ ] vomiting [ ] diarrhea [ ] constipation [ ] pain	  Genitourinary:  [ ] dysuria [ ] frequency [ ] hematuria [ ] discharge [ ] flank pain  Musculoskeletal:  [ ] myalgias [ ] arthralgias [ ] arthritis	  Neurological:  [ ] headache [ ] seizures	  Psychiatric:  [ ] anxiety [ ] depression	  Hematology/Lymphatics:  [ ] lymphadenopathy  Endocrine:  [ ] adrenal [ ] thyroid  Allergic/Immunologic:	 [ ] transplant [ ] seasonal    Vital Signs Last 24 Hrs  T(F): 97.6 (17 @ 06:03), Max: 103.5 (17 @ 22:00)    Vital Signs Last 24 Hrs  HR: 41 (17 @ 08:37) (41 - 82)  BP: 124/55 (17 @ 08:37) (108/51 - 176/82)  RR: 19 (17 @ 08:37)  SpO2: 98% (17 @ 08:37) (97% - 100%)  Wt(kg): --    PHYSICAL EXAM:  General: non-toxic  HEAD/EYES: anicteric, PERRL  ENT:  supple  Cardiovascular:   S1, S2  Respiratory:  clear bilaterally  GI:  soft, non-tender, normal bowel sounds  :  no CVA tenderness   Musculoskeletal:  no synovitis  Neurologic:  grossly non-focal  Skin:  no rash  Lymph: no lymphadenopathy  Psychiatric:  appropriate affect  Vascular:  no phlebitis                                9.8    4.89  )-----------( 198      ( 2017 06:55 )             31.2           146<H>  |  113<H>  |  10  ----------------------------<  93  3.4<L>   |  21<L>  |  0.93    Ca    7.8<L>      2017 06:55  Mg     1.7             Urinalysis Basic - ( 2017 06:50 )    Color: YELLOW / Appearance: HAZY / S.023 / pH: 6.0  Gluc: NEGATIVE / Ketone: NEGATIVE  / Bili: NEGATIVE / Urobili: NORMAL E.U.   Blood: NEGATIVE / Protein: 30 / Nitrite: POSITIVE   Leuk Esterase: LARGE / RBC: 10-25 / WBC >50   Sq Epi: FEW / Non Sq Epi: x / Bacteria: FEW    MICROBIOLOGY:  URINE MIDSTREAM  17 --  --  --      BLOOD VENOUS  17 --  --  --    RADIOLOGY:    EXAM:  RAD CHEST PORTABLE URGENT    PROCEDURE DATE:  2017   Bibasilar and retrocardiac opacity, greater on the left, suggesting small pleural effusions with likely associated passive atelectasis. Underlying atelectasis of other cause and/or pneumonia is not excluded.    EXAM:  RAD CINESOPHAGRAM    PROCEDURE DATE:  2017   Aspiration and penetration were observed.  For further information and recommendations, please refer to the speech pathologist final report which is available for review in the electronic medical record.    EXAM: Transthoracic echocardiogram  PROCEDURE DATE:  2017  1. Mitral annular calcification, otherwise normal mitral valve. Mild-moderate mitral regurgitation.  2. Calcified trileaflet aortic valve with normal opening. Mild-moderate aortic regurgitation.  3. Mildly dilated left atrium.  LA volume index = 40 cc/m2.  4. Moderate concentric left ventricular hypertrophy.  5. Mild global left ventricular systolic dysfunction.  6. Normal right ventricular size and function.  7. Estimated right ventricular systolic pressure equals 39 mm Hg, assuming right atrial pressure equals 10 mm Hg, consistent with borderline pulmonary hypertension.    EXAM:  CT BRAIN    PROCEDURE DATE:  2017   There is a new right MCA infarct of the anterior and middle right temporal lobe. There is no intracranial hemorrhage. There is no significant mass effect on the ventricles or basal ganglia. There is no hydrocephalus or midline shift.  The calvarium, skull base, paranasal sinuses, orbits and mastoid air cells are normal. 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 Symptoms started the morning of admission. The patient's home health side noted left arm and leg weakness with a new left-sided facial droop so the patient's daughter was called and patient was brought to the ED. CT Head was obtained which was suggestive of new right MCA infarct. The patient received TPA in the ED. Unfortunately soon after administration the patient's IV infiltrated. Patient developed fever to 100.6 on . Given persistent fever started on Zosyn on . U/A on  with > 50 WBC. CXR with possible pneumonia. Last febrile to Tmax of 102.8 on the night of . Symptoms of cough developed several days prior to assessment and have become more productive. Mild associated shortness of breath and wheezing. No N/V/D. No urinary symptoms. No new skin rash. No pain at prior site of IV infiltration.     PAST MEDICAL & SURGICAL HISTORY:  Depression  Osteoarthritis  TIA (transient ischemic attack)  Rheumatoid arthritis, adult  S/P appendectomy  Status post total knee replacement: bilateral  History of cholecystectomy      Allergies  ACE inhibitors (Angioedema)  morphine (Unknown)        ANTIMICROBIALS:  piperacillin/tazobactam IVPB. 3.375 every 8 hours      OTHER MEDS: MEDICATIONS  (STANDING):  acetaminophen   Tablet 650 every 6 hours PRN  acetaminophen   Tablet. 650 every 6 hours PRN  apixaban 2.5 every 12 hours  metoprolol     tartrate 12.5 two times a day  pantoprazole  Injectable 40 daily      SOCIAL HISTORY:  No smoking or etoh. Prior to admission was able to walk with walker with some assistance from home health aide or family.     FAMILY HISTORY:  No pertinent family history in first degree relatives      REVIEW OF SYSTEMS  [  ] ROS unobtainable because:    [x  ] All other systems negative except as noted below:	    Constitutional:  [x ] fever [ ] weight loss  Skin:  [ ] rash [ ] phlebitis	  Eyes: [ ] icterus [ ] inflammation	  ENMT: [ ] discharge [ ] thrush [ ] ulcers [ ] exudates  Respiratory: [ ] dyspnea [ ] hemoptysis [ x] cough [x ] sputum	  Cardiovascular:  [ ] chest pain [ ] palpitations [ ] edema	  Gastrointestinal:  [ ] nausea [ ] vomiting [ ] diarrhea [ ] constipation [ ] pain	  Genitourinary:  [ ] dysuria [ ] frequency [ ] hematuria [ ] discharge [ ] flank pain  Musculoskeletal:  [ ] myalgias [ ] arthralgias [ ] arthritis	  Neurological:  [ ] headache [ ] seizures	  Psychiatric:  [ ] anxiety [ ] depression	  Hematology/Lymphatics:  [ ] lymphadenopathy  Endocrine:  [ ] adrenal [ ] thyroid  Allergic/Immunologic:	 [ ] transplant [ ] seasonal    Vital Signs Last 24 Hrs  T(F): 97.6 (17 @ 06:03), Max: 103.5 (17 @ 22:00)    Vital Signs Last 24 Hrs  HR: 41 (17 @ 08:37) (41 - 82)  BP: 124/55 (17 @ 08:37) (108/51 - 176/82)  RR: 19 (17 @ 08:37)  SpO2: 98% (17 @ 08:37) (97% - 100%)  Wt(kg): --    PHYSICAL EXAMINATION:  General: Alert and Awake, NAD  HEENT: PERRL, EOMI, No subconjunctival hemorrhages, Oropharynx Clear, MMM  Neck: Supple, No JAN  Cardiac: RRR, No M/R/G  Resp: Decreased BS at left lung base, mild end expiratory wheezes.  Abdomen: Obese, NT/ND, No HSM, No rigidity or guarding  MSK: No LE edema. No stigmata of IE. No evidence of phlebitis. No evidence of synovitis.  Back: No CVA Tenderness  Skin: No rashes or lesions. Skin is warm and dry to the touch.   Neuro: Alert and Awake. L-sided facial droop, otherwise CN 2-12 Grossly intact. Moves all four extremities spontaneously. 4/5 strength in LUE and LLE. 5/5 strength in rue and rle.   Psych: Calm, Pleasant, Cooperative                          9.8    4.89  )-----------( 198      ( 2017 06:55 )             31.2           146<H>  |  113<H>  |  10  ----------------------------<  93  3.4<L>   |  21<L>  |  0.93    Ca    7.8<L>      2017 06:55  Mg     1.7             Urinalysis Basic - ( 2017 06:50 )    Color: YELLOW / Appearance: HAZY / S.023 / pH: 6.0  Gluc: NEGATIVE / Ketone: NEGATIVE  / Bili: NEGATIVE / Urobili: NORMAL E.U.   Blood: NEGATIVE / Protein: 30 / Nitrite: POSITIVE   Leuk Esterase: LARGE / RBC: 10-25 / WBC >50   Sq Epi: FEW / Non Sq Epi: x / Bacteria: FEW    MICROBIOLOGY:  URINE MIDSTREAM  17 --  --  --      BLOOD VENOUS  17 --  --  --    RADIOLOGY:    EXAM:  RAD CHEST PORTABLE URGENT    PROCEDURE DATE:  2017   Bibasilar and retrocardiac opacity, greater on the left, suggesting small pleural effusions with likely associated passive atelectasis. Underlying atelectasis of other cause and/or pneumonia is not excluded.    EXAM:  RAD CINESOPHAGRAM    PROCEDURE DATE:  2017   Aspiration and penetration were observed.  For further information and recommendations, please refer to the speech pathologist final report which is available for review in the electronic medical record.    EXAM: Transthoracic echocardiogram  PROCEDURE DATE:  2017  1. Mitral annular calcification, otherwise normal mitral valve. Mild-moderate mitral regurgitation.  2. Calcified trileaflet aortic valve with normal opening. Mild-moderate aortic regurgitation.  3. Mildly dilated left atrium.  LA volume index = 40 cc/m2.  4. Moderate concentric left ventricular hypertrophy.  5. Mild global left ventricular systolic dysfunction.  6. Normal right ventricular size and function.  7. Estimated right ventricular systolic pressure equals 39 mm Hg, assuming right atrial pressure equals 10 mm Hg, consistent with borderline pulmonary hypertension.    EXAM:  CT BRAIN    PROCEDURE DATE:  2017   There is a new right MCA infarct of the anterior and middle right temporal lobe. There is no intracranial hemorrhage. There is no significant mass effect on the ventricles or basal ganglia. There is no hydrocephalus or midline shift.  The calvarium, skull base, paranasal sinuses, orbits and mastoid air cells are normal. 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 Symptoms started the morning of admission. The patient's home health side noted left arm and leg weakness with a new left-sided facial droop so the patient's daughter was called and patient was brought to the ED. CT Head was obtained which was suggestive of new right MCA infarct. The patient received TPA in the ED. Unfortunately soon after administration the patient's IV infiltrated. Patient developed fever to 100.6 on . Given persistent fever started on Zosyn on . U/A on  with > 50 WBC. CXR with possible pneumonia. Last febrile to Tmax of 102.8 on the night of . Symptoms of cough developed several days prior to assessment and have become more productive. Mild associated shortness of breath and wheezing. No N/V/D. No urinary symptoms. No new skin rash. No pain at prior site of IV infiltration.     PAST MEDICAL & SURGICAL HISTORY:  Depression  Osteoarthritis  TIA (transient ischemic attack)  Rheumatoid arthritis, adult  S/P appendectomy  Status post total knee replacement: bilateral  History of cholecystectomy      Allergies  ACE inhibitors (Angioedema)  morphine (Unknown)        ANTIMICROBIALS:  piperacillin/tazobactam IVPB. 3.375 every 8 hours      OTHER MEDS: MEDICATIONS  (STANDING):  acetaminophen   Tablet 650 every 6 hours PRN  acetaminophen   Tablet. 650 every 6 hours PRN  apixaban 2.5 every 12 hours  metoprolol     tartrate 12.5 two times a day  pantoprazole  Injectable 40 daily      SOCIAL HISTORY:  No smoking or etoh. Prior to admission was able to walk with walker with some assistance from home health aide or family.     FAMILY HISTORY:  No pertinent family history in first degree relatives      REVIEW OF SYSTEMS  [  ] ROS unobtainable because:    [x  ] All other systems negative except as noted below:	    Constitutional:  [x ] fever [ ] weight loss  Skin:  [ ] rash [ ] phlebitis	  Eyes: [ ] icterus [ ] inflammation	  ENMT: [ ] discharge [ ] thrush [ ] ulcers [ ] exudates  Respiratory: [ ] dyspnea [ ] hemoptysis [ x] cough [x ] sputum	  Cardiovascular:  [ ] chest pain [ ] palpitations [ ] edema	  Gastrointestinal:  [ ] nausea [ ] vomiting [ ] diarrhea [ ] constipation [ ] pain	  Genitourinary:  [ ] dysuria [ ] frequency [ ] hematuria [ ] discharge [ ] flank pain  Musculoskeletal:  [ ] myalgias [ ] arthralgias [ ] arthritis	  Neurological:  [ ] headache [ ] seizures	  Psychiatric:  [ ] anxiety [ ] depression	  Hematology/Lymphatics:  [ ] lymphadenopathy  Endocrine:  [ ] adrenal [ ] thyroid  Allergic/Immunologic:	 [ ] transplant [ ] seasonal    Vital Signs Last 24 Hrs  T(F): 97.6 (17 @ 06:03), Max: 103.5 (17 @ 22:00)    Vital Signs Last 24 Hrs  HR: 41 (17 @ 08:37) (41 - 82)  BP: 124/55 (17 @ 08:37) (108/51 - 176/82)  RR: 19 (17 @ 08:37)  SpO2: 98% (17 @ 08:37) (97% - 100%)  Wt(kg): --    PHYSICAL EXAMINATION:  General: Alert and Awake, NAD  HEENT: PERRL, EOMI, L conjunctival erythema and crusting, Oropharynx Clear, MMM  Neck: Supple, No JAN  Cardiac: RRR, No M/R/G  Resp: Decreased BS at left lung base, mild end expiratory wheezes.  Abdomen: Obese, NT/ND, No HSM, No rigidity or guarding  MSK: No LE edema. No stigmata of IE. No evidence of phlebitis. No evidence of synovitis.  Back: No CVA Tenderness  Skin: No rashes or lesions. Skin is warm and dry to the touch.   Neuro: Alert and Awake. L-sided facial droop, otherwise CN 2-12 Grossly intact. Moves all four extremities spontaneously. 4/5 strength in LUE and LLE. 5/5 strength in rue and rle.   Psych: Calm, Pleasant, Cooperative                          9.8    4.89  )-----------( 198      ( 2017 06:55 )             31.2           146<H>  |  113<H>  |  10  ----------------------------<  93  3.4<L>   |  21<L>  |  0.93    Ca    7.8<L>      2017 06:55  Mg     1.7             Urinalysis Basic - ( 2017 06:50 )    Color: YELLOW / Appearance: HAZY / S.023 / pH: 6.0  Gluc: NEGATIVE / Ketone: NEGATIVE  / Bili: NEGATIVE / Urobili: NORMAL E.U.   Blood: NEGATIVE / Protein: 30 / Nitrite: POSITIVE   Leuk Esterase: LARGE / RBC: 10-25 / WBC >50   Sq Epi: FEW / Non Sq Epi: x / Bacteria: FEW    MICROBIOLOGY:  URINE MIDSTREAM  17 --  --  --      BLOOD VENOUS  17 --  --  --    RADIOLOGY:    EXAM:  RAD CHEST PORTABLE URGENT    PROCEDURE DATE:  2017   Bibasilar and retrocardiac opacity, greater on the left, suggesting small pleural effusions with likely associated passive atelectasis. Underlying atelectasis of other cause and/or pneumonia is not excluded.    EXAM:  RAD CINESOPHAGRAM    PROCEDURE DATE:  2017   Aspiration and penetration were observed.  For further information and recommendations, please refer to the speech pathologist final report which is available for review in the electronic medical record.    EXAM: Transthoracic echocardiogram  PROCEDURE DATE:  2017  1. Mitral annular calcification, otherwise normal mitral valve. Mild-moderate mitral regurgitation.  2. Calcified trileaflet aortic valve with normal opening. Mild-moderate aortic regurgitation.  3. Mildly dilated left atrium.  LA volume index = 40 cc/m2.  4. Moderate concentric left ventricular hypertrophy.  5. Mild global left ventricular systolic dysfunction.  6. Normal right ventricular size and function.  7. Estimated right ventricular systolic pressure equals 39 mm Hg, assuming right atrial pressure equals 10 mm Hg, consistent with borderline pulmonary hypertension.    EXAM:  CT BRAIN    PROCEDURE DATE:  2017   There is a new right MCA infarct of the anterior and middle right temporal lobe. There is no intracranial hemorrhage. There is no significant mass effect on the ventricles or basal ganglia. There is no hydrocephalus or midline shift.  The calvarium, skull base, paranasal sinuses, orbits and mastoid air cells are normal.

## 2017-11-29 NOTE — PROGRESS NOTE ADULT - PROBLEM SELECTOR PLAN 5
Pt with intermittent episodes of Paroxsymal Afib with RVR   EP on board- advise Metoprolol   Family still undecided about AC despite multiple, daily, lengthy conversations with daughter- Karma Pt with intermittent episodes of Paroxsymal Afib with RVR   EP on board- advise Metoprolol   Family still undecided about AC despite multiple, daily, lengthy conversations with daughter- Karma  See note below

## 2017-11-29 NOTE — PROGRESS NOTE ADULT - PROBLEM SELECTOR PLAN 3
- pt has been off a/c since 12/2016 secondary to gib   - given the patients history of a life threatening gi bleed during her hospital stay in [place] and inability to undergo endoscopic evaluation d/t her cardiac comorbidities she would be at a moderate to high risk for rebleeding if anticoagulation were to be restarted  - discussed at length with daughter with agreement and understanding that given negative occult and stable h/h role for endoscopic evaluation would not be beneficial and risks of undergoing anesthesia outweigh the benefits  - recommend to keep on protonix qd

## 2017-11-29 NOTE — CHART NOTE - NSCHARTNOTEFT_GEN_A_CORE
Telemetry with SR with episodes of PAF seen.  Patient with minimal symptoms with PAF.  Family agreed to start AC Eliquis for thromboembolic prophylaxis today.  Continue low dose beta blocker for rate control as tolerated.

## 2017-11-29 NOTE — CONSULT NOTE ADULT - ASSESSMENT
89 yo F PMH Rheumatoid Arthritis (On prednisone, leflunamide, MTX prior to admission), Prior CVA (11/2016 with residual speech deficit), GIB (after last CVA) who presented to [facility] on 11/20/17 with left sided weakness. Patient developed fever to 100.6 on 11/21. Given persistent fever started on Zosyn on 11/28. U/A on 11/28 with > 50 WBC. CXR with possible pneumonia. Last febrile to Tmax of 102.8 on the night of 11/28. Symptoms of cough developed several days prior to assessment and have become more productive. 89 yo F PMH Rheumatoid Arthritis (On prednisone, leflunamide, MTX prior to admission), Prior CVA (11/2016 with residual speech deficit), GIB (after last CVA) who presented to [facility] on 11/20/17 with left sided weakness. Patient developed fever to 100.6 on 11/21. Given persistent fever started on Zosyn on 11/28. U/A on 11/28 with > 50 WBC. CXR with possible pneumonia. Last febrile to Tmax of 102.8 on the night of 11/28. Symptoms of cough developed several days prior to assessment and have become more productive. Likely cause of fever is aspiration PNA +/- UTI. She also has evidence of likely left-sided conjunctivitis.   --Continue Zosyn  --F/U Blood cultures  --Contact Precautions for L-sided conjunctivitis.

## 2017-11-29 NOTE — PROGRESS NOTE ADULT - PROBLEM SELECTOR PLAN 6
pt had been on MTX, Prednisone and Leflunomide  defer resumption of DMARDs pending decision on AC  Daughters aware Mild hypernatremia with hypokalemia  will change IVF to D51/2NS at 70   Replete Potassium

## 2017-11-30 LAB
BUN SERPL-MCNC: 10 MG/DL — SIGNIFICANT CHANGE UP (ref 7–23)
CALCIUM SERPL-MCNC: 8.5 MG/DL — SIGNIFICANT CHANGE UP (ref 8.4–10.5)
CHLORIDE SERPL-SCNC: 109 MMOL/L — HIGH (ref 98–107)
CO2 SERPL-SCNC: 22 MMOL/L — SIGNIFICANT CHANGE UP (ref 22–31)
CREAT SERPL-MCNC: 0.9 MG/DL — SIGNIFICANT CHANGE UP (ref 0.5–1.3)
GLUCOSE SERPL-MCNC: 107 MG/DL — HIGH (ref 70–99)
HCT VFR BLD CALC: 35.3 % — SIGNIFICANT CHANGE UP (ref 34.5–45)
HGB BLD-MCNC: 10.9 G/DL — LOW (ref 11.5–15.5)
MCHC RBC-ENTMCNC: 29.8 PG — SIGNIFICANT CHANGE UP (ref 27–34)
MCHC RBC-ENTMCNC: 30.9 % — LOW (ref 32–36)
MCV RBC AUTO: 96.4 FL — SIGNIFICANT CHANGE UP (ref 80–100)
METHOD TYPE: SIGNIFICANT CHANGE UP
NRBC # FLD: 0 — SIGNIFICANT CHANGE UP
ORGANISM # SPEC MICROSCOPIC CNT: SIGNIFICANT CHANGE UP
ORGANISM # SPEC MICROSCOPIC CNT: SIGNIFICANT CHANGE UP
PLATELET # BLD AUTO: 225 K/UL — SIGNIFICANT CHANGE UP (ref 150–400)
PMV BLD: 9.7 FL — SIGNIFICANT CHANGE UP (ref 7–13)
POTASSIUM SERPL-MCNC: 3.5 MMOL/L — SIGNIFICANT CHANGE UP (ref 3.5–5.3)
POTASSIUM SERPL-SCNC: 3.5 MMOL/L — SIGNIFICANT CHANGE UP (ref 3.5–5.3)
RBC # BLD: 3.66 M/UL — LOW (ref 3.8–5.2)
RBC # FLD: 15.1 % — HIGH (ref 10.3–14.5)
SODIUM SERPL-SCNC: 143 MMOL/L — SIGNIFICANT CHANGE UP (ref 135–145)
WBC # BLD: 5.34 K/UL — SIGNIFICANT CHANGE UP (ref 3.8–10.5)
WBC # FLD AUTO: 5.34 K/UL — SIGNIFICANT CHANGE UP (ref 3.8–10.5)

## 2017-11-30 PROCEDURE — 99232 SBSQ HOSP IP/OBS MODERATE 35: CPT

## 2017-11-30 PROCEDURE — 99233 SBSQ HOSP IP/OBS HIGH 50: CPT

## 2017-11-30 RX ORDER — PANTOPRAZOLE SODIUM 20 MG/1
40 TABLET, DELAYED RELEASE ORAL
Qty: 0 | Refills: 0 | Status: DISCONTINUED | OUTPATIENT
Start: 2017-11-30 | End: 2017-12-18

## 2017-11-30 RX ORDER — SUCRALFATE 1 G
1 TABLET ORAL
Qty: 0 | Refills: 0 | Status: DISCONTINUED | OUTPATIENT
Start: 2017-11-30 | End: 2017-12-18

## 2017-11-30 RX ORDER — PANTOPRAZOLE SODIUM 20 MG/1
40 TABLET, DELAYED RELEASE ORAL DAILY
Qty: 0 | Refills: 0 | Status: DISCONTINUED | OUTPATIENT
Start: 2017-11-30 | End: 2017-11-30

## 2017-11-30 RX ADMIN — Medication 12.5 MILLIGRAM(S): at 06:59

## 2017-11-30 RX ADMIN — Medication 1 TABLET(S): at 12:39

## 2017-11-30 RX ADMIN — Medication 12.5 MILLIGRAM(S): at 17:49

## 2017-11-30 RX ADMIN — Medication 1 GRAM(S): at 18:06

## 2017-11-30 RX ADMIN — Medication 1 TABLET(S): at 09:02

## 2017-11-30 RX ADMIN — Medication 1 DROP(S): at 16:01

## 2017-11-30 RX ADMIN — Medication 650 MILLIGRAM(S): at 22:43

## 2017-11-30 RX ADMIN — PIPERACILLIN AND TAZOBACTAM 25 GRAM(S): 4; .5 INJECTION, POWDER, LYOPHILIZED, FOR SOLUTION INTRAVENOUS at 05:23

## 2017-11-30 RX ADMIN — PIPERACILLIN AND TAZOBACTAM 25 GRAM(S): 4; .5 INJECTION, POWDER, LYOPHILIZED, FOR SOLUTION INTRAVENOUS at 13:56

## 2017-11-30 RX ADMIN — Medication 1 GRAM(S): at 12:39

## 2017-11-30 RX ADMIN — Medication 1 GRAM(S): at 22:46

## 2017-11-30 RX ADMIN — PANTOPRAZOLE SODIUM 40 MILLIGRAM(S): 20 TABLET, DELAYED RELEASE ORAL at 16:01

## 2017-11-30 RX ADMIN — SODIUM CHLORIDE 70 MILLILITER(S): 9 INJECTION, SOLUTION INTRAVENOUS at 05:23

## 2017-11-30 RX ADMIN — APIXABAN 2.5 MILLIGRAM(S): 2.5 TABLET, FILM COATED ORAL at 17:49

## 2017-11-30 RX ADMIN — Medication 1 TABLET(S): at 17:49

## 2017-11-30 RX ADMIN — Medication 650 MILLIGRAM(S): at 23:43

## 2017-11-30 RX ADMIN — PIPERACILLIN AND TAZOBACTAM 25 GRAM(S): 4; .5 INJECTION, POWDER, LYOPHILIZED, FOR SOLUTION INTRAVENOUS at 22:04

## 2017-11-30 RX ADMIN — APIXABAN 2.5 MILLIGRAM(S): 2.5 TABLET, FILM COATED ORAL at 06:59

## 2017-11-30 NOTE — PROGRESS NOTE ADULT - PROBLEM SELECTOR PLAN 8
Discussed with daughter Karma- pt has been off Zoloft  for more than a year as per doctors recommendation.   will not restart it as pt without any acute symptoms at this time

## 2017-11-30 NOTE — PROGRESS NOTE ADULT - SUBJECTIVE AND OBJECTIVE BOX
CC: Fevers possibly from aspiration PNA/pneumonitis vs UTI, right MCA infarct, left eye conjunctivitis most likely viral.    Inverval History/ROS: Patient has no complaints. Denies fever, chills, chest pain, sob, cough. Left eye redness and discharge improved.    Allergies  ACE inhibitors (Angioedema)  morphine (Unknown)    ANTIMICROBIALS:  piperacillin/tazobactam IVPB. 3.375 every 8 hours    OTHER MEDS:  acetaminophen   Tablet 650 milliGRAM(s) Oral every 6 hours PRN  acetaminophen   Tablet. 650 milliGRAM(s) Oral every 6 hours PRN  apixaban 2.5 milliGRAM(s) Oral every 12 hours  artificial tears (preservative free) Ophthalmic Solution 1 Drop(s) Left EYE two times a day  dextrose 5% + sodium chloride 0.3%. 1000 milliLiter(s) IV Continuous <Continuous>  lactobacillus acidophilus 1 Tablet(s) Oral three times a day with meals  metoprolol     tartrate 12.5 milliGRAM(s) Oral two times a day  pantoprazole  Injectable 40 milliGRAM(s) IV Push daily  sucralfate 1 Gram(s) Oral four times a day      PE:    Vital Signs Last 24 Hrs  T(C): 37.2 (30 Nov 2017 06:40), Max: 37.6 (29 Nov 2017 21:21)  T(F): 99 (30 Nov 2017 06:40), Max: 99.7 (29 Nov 2017 21:21)  HR: 63 (30 Nov 2017 06:40) (46 - 73)  BP: 134/56 (30 Nov 2017 06:40) (134/56 - 159/66)  BP(mean): --  RR: 18 (30 Nov 2017 06:40) (18 - 18)  SpO2: 100% (30 Nov 2017 06:40) (98% - 100%)    Gen: AOx3, NAD, non-toxic  CV: S1+S2 normal, no murmurs  Resp: Clear bilat, no resp distress  Abd: Soft, nontender, +BS  Ext: No LE edema, no wounds  : No [place]  IV/Skin: No thrombophlebitis  Neuro: left sided facial weakness.    LABS:                          9.8    4.89  )-----------( 198      ( 29 Nov 2017 06:55 )             31.2       11-29    146<H>  |  113<H>  |  10  ----------------------------<  93  3.4<L>   |  21<L>  |  0.93    Ca    7.8<L>      29 Nov 2017 06:55  Mg     1.7     11-29      MICROBIOLOGY:  v  URINE MIDSTREAM  11-28-17 --  --  --      BLOOD VENOUS  11-27-17 --  --  --      RADIOLOGY:    No new images.

## 2017-11-30 NOTE — PROGRESS NOTE ADULT - ASSESSMENT
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     CT Head with new right MCA infarct. Received TPA in the ED with infiltrated IV during administration of TPA.     Patient developed fevers. Workup included a UA with > 50 WBC. CXR with possible pneumonia. Esophagram demonstrating aspiration. Bld cxs NGTD and urine cx with GNR >100K.    Now afebrile. WBC WNL.    Assessment:  -Fevers possibly from aspiration PNA/pneumonitis vs UTI  -Right MCA infarct  -Left eye conjunctivitis most likely viral - now improved.      Recommend:  -Continue zosyn.  -F/U bld and urine cxs.      Johnnie Martinez MD  Pager [phone]  After 5pm/weekends call [phone] . 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     CT Head with new right MCA infarct. Received TPA in the ED with infiltrated IV during administration of TPA.     Patient developed fevers. Workup included a UA with > 50 WBC. CXR with possible pneumonia. Esophagram demonstrating aspiration. Bld cxs NGTD and urine cx with GNR >100K.    Assessment:  -Fevers possibly from aspiration PNA/pneumonitis vs UTI  -Right MCA infarct  -Left eye conjunctivitis most likely viral     Recommend:  -Continue zosyn.  -F/U bld and urine cxs.    Johnnie Martinez MD  Pager [phone]  After 5pm/weekends call [phone]

## 2017-11-30 NOTE — PROGRESS NOTE ADULT - ASSESSMENT
89F with PMH of depression, osteoarthritis, rheumatoid arthritis, TIA and stroke in 2016, p/w RT MCA infarct. 89F with PMH of depression, osteoarthritis, rheumatoid arthritis, TIA and stroke in 2016, p/w RT MCA infarct. Cardiology called to evaluate for anticoagulation risk/benefit. CHADSVASC is 5.  HASBLED score is 5.  Patient with episodes of paroxysmal atrial fibrillation in the setting of fever, viral conjunctivitis and possible  pneumonia. Fever now resolved.  Now on low dose beta blocker for rate control  and Eliquis for anticoagulation.  Patient no longer requires Cardionet or ILR monitoring.

## 2017-11-30 NOTE — PROGRESS NOTE ADULT - PROBLEM SELECTOR PLAN 1
- new right MCA infarct s/p TPA in ED   - neurology workup appreciated  - TTE negative for LV or Atrial thrombus  - given the patients history of a life threatening gi bleed during her hospital stay in [place] and inability to undergo endoscopic evaluation d/t her cardiac comorbidities she would be at a moderate to high risk for rebleeding if anticoagulation were to be restarted  - occult negative (-) and has no overt/occult signs of GI bleeding   - Video Capsule EGD not performed as patient did not want to attempt swallowing due to size of the capsule  - discussed at length with daughter and patient with agreement and understanding that given negative occult and stable h/h role for endoscopic evaluation would not be beneficial and risks of undergoing anesthesia outweigh the benefits  - recommend protonix bid while on eliquis and carafate  - recommend palliative/hospice evaluation re: GOC

## 2017-11-30 NOTE — DIETITIAN INITIAL EVALUATION ADULT. - FACTORS AFF FOOD INTAKE
difficulty chewing/Pt s/p right MCA infarct s/p TPA, + dysphagia/none/change in mental status/difficulty swallowing

## 2017-11-30 NOTE — DIETITIAN INITIAL EVALUATION ADULT. - PT NOT SOURCE
confused/other (specify)/Pt sleeping at the time of visit. RDN spoke with daughter at bedside, who provided Pt Hx.

## 2017-11-30 NOTE — PROGRESS NOTE ADULT - PROBLEM SELECTOR PLAN 1
concerning for gram negative PNA   likely secondary to Aspiration PNA  vs UTI   Chest Xray reviewed- Bibasilar and retrocardiac opacity, greater on the left, suggesting small pleural effusions with likely associated passive atelectasis. Underlying atelectasis of other cause and/or pneumonia is not excluded.  UA - positive LE with > 50 WBCs  Pt started on Zosyn/Incentive spirometry ordered/Encourage OOB to chair as tolerated    BC- NGTD  Await UC  ID consult - recommend contact isolation for viral conjuctivitis concerning for gram negative PNA   likely secondary to Aspiration PNA  vs UTI   Chest Xray reviewed- Bibasilar and retrocardiac opacity, greater on the left, suggesting small pleural effusions with likely associated passive atelectasis. Underlying atelectasis of other cause and/or pneumonia is not excluded.  UA - positive LE with > 50 WBCs  Pt started on Zosyn/Incentive spirometry ordered/Encourage OOB to chair as tolerated    BC- NGTD  UC- GNR , await species identification and Sx  ID consult - recommend contact isolation for viral conjuctivitis  Await duration and choice of Abx

## 2017-11-30 NOTE — PROGRESS NOTE ADULT - SUBJECTIVE AND OBJECTIVE BOX
INTERVAL HPI/OVERNIGHT EVENTS:    pt with no overt gi bleeding with bowel movements per staff  no abdominal complaints   started on eliquis last night     MEDICATIONS  (STANDING):  apixaban 2.5 milliGRAM(s) Oral every 12 hours  artificial tears (preservative free) Ophthalmic Solution 1 Drop(s) Left EYE two times a day  dextrose 5% + sodium chloride 0.3%. 1000 milliLiter(s) (70 mL/Hr) IV Continuous <Continuous>  lactobacillus acidophilus 1 Tablet(s) Oral three times a day with meals  metoprolol     tartrate 12.5 milliGRAM(s) Oral two times a day  pantoprazole    Tablet 40 milliGRAM(s) Oral two times a day before meals  piperacillin/tazobactam IVPB. 3.375 Gram(s) IV Intermittent every 8 hours  sucralfate 1 Gram(s) Oral four times a day    MEDICATIONS  (PRN):  acetaminophen   Tablet 650 milliGRAM(s) Oral every 6 hours PRN For Temp greater than 38 C (100.4 F)  acetaminophen   Tablet. 650 milliGRAM(s) Oral every 6 hours PRN Mild Pain (1 - 3)      Allergies    ACE inhibitors (Angioedema)  morphine (Unknown)    Intolerances    caffeine (Stomach Upset)  lactose (Other)      Review of Systems: *per daughter    General:  No wt loss, fevers, chills, night sweats, fatigue   Eyes:  Good vision, no reported pain  ENT:  No sore throat, pain, runny nose, dysphagia  CV:  No pain, palpitations, hypo/hypertension  Resp:  No dyspnea, cough, tachypnea, wheezing  GI:  No pain, No nausea, No vomiting, No diarrhea, No constipation, No weight loss, No fever, No pruritis, No rectal bleeding, No melena, No dysphagia  :  No pain, bleeding, incontinence, nocturia  Muscle:  No pain, weakness  Neuro:  No weakness, tingling, memory problems  Psych:  No fatigue, insomnia, mood problems, depression  Endocrine:  No polyuria, polydypsia, cold/heat intolerance  Heme:  No petechiae, ecchymosis, easy bruisability  Skin:  No rash, tattoos, scars, edema      Vital Signs Last 24 Hrs  T(C): 37.2 (30 Nov 2017 06:40), Max: 37.6 (29 Nov 2017 21:21)  T(F): 99 (30 Nov 2017 06:40), Max: 99.7 (29 Nov 2017 21:21)  HR: 63 (30 Nov 2017 06:40) (46 - 73)  BP: 134/56 (30 Nov 2017 06:40) (134/56 - 159/66)  BP(mean): --  RR: 18 (30 Nov 2017 06:40) (18 - 18)  SpO2: 100% (30 Nov 2017 06:40) (98% - 100%)    PHYSICAL EXAM:    Constitutional: NAD  HEENT: EOMI, throat clear  Neck: No LAD, supple  Respiratory: CTA and P  Cardiovascular: S1 and S2, RRR, no M  Gastrointestinal: BS+, soft, NT/ND, neg HSM,  Extremities: No peripheral edema, neg clubbing, cyanosis  Vascular: 2+ peripheral pulses  Neurological: A/O x 1-2, no focal deficits  Psychiatric: Normal mood, normal affect  Skin: No rashes      LABS:                        9.8    4.89  )-----------( 198      ( 29 Nov 2017 06:55 )             31.2     11-29    146<H>  |  113<H>  |  10  ----------------------------<  93  3.4<L>   |  21<L>  |  0.93    Ca    7.8<L>      29 Nov 2017 06:55  Mg     1.7     11-29            RADIOLOGY & ADDITIONAL TESTS:

## 2017-11-30 NOTE — DIETITIAN INITIAL EVALUATION ADULT. - OTHER INFO
Initial Dietitian Evaluation 2/2 to extended length of stay. No GI distress (nausea/vomiting/diarrhea/constipation.) No reported difficulties chewing or swallowing foods and fluids with current diet, Dysphagia 1, Pureed,  Honey Consistency per MBS recommendations 11/24.  Pt's daughter states that PO intake has been good throughout admission, however, it patient appears to be tired of some of the foods she is receiving.  RDN to implement food preferences.

## 2017-11-30 NOTE — PROGRESS NOTE ADULT - SUBJECTIVE AND OBJECTIVE BOX
Patient is a 89y old  Female who presents with a chief complaint of CVA (22 Nov 2017 09:17)      SUBJECTIVE / OVERNIGHT EVENTS: Reviewed events-   No fever overnight  Pt reports occ cough   Deneis dysuria or frequenncy      MEDICATIONS  (STANDING):  apixaban 2.5 milliGRAM(s) Oral every 12 hours  dextrose 5% + sodium chloride 0.3%. 1000 milliLiter(s) (70 mL/Hr) IV Continuous <Continuous>  lactobacillus acidophilus 1 Tablet(s) Oral three times a day with meals  metoprolol     tartrate 12.5 milliGRAM(s) Oral two times a day  pantoprazole  Injectable 40 milliGRAM(s) IV Push daily  piperacillin/tazobactam IVPB. 3.375 Gram(s) IV Intermittent every 8 hours  sucralfate 1 Gram(s) Oral four times a day    MEDICATIONS  (PRN):  acetaminophen   Tablet 650 milliGRAM(s) Oral every 6 hours PRN For Temp greater than 38 C (100.4 F)  acetaminophen   Tablet. 650 milliGRAM(s) Oral every 6 hours PRN Mild Pain (1 - 3)    Vital Signs Last 24 Hrs  T(C): 37.2 (30 Nov 2017 06:40), Max: 37.6 (29 Nov 2017 21:21)  T(F): 99 (30 Nov 2017 06:40), Max: 99.7 (29 Nov 2017 21:21)  HR: 63 (30 Nov 2017 06:40) (46 - 73)  BP: 134/56 (30 Nov 2017 06:40) (134/56 - 159/66)  BP(mean): --  RR: 18 (30 Nov 2017 06:40) (18 - 18)  SpO2: 100% (30 Nov 2017 06:40) (98% - 100%)    PHYSICAL EXAM:  General: NAD  HEENT: L sided facial droop. Rightward gaze preference. dry mucous membranes  Lymph Nodes: no cervical lymphadenopathy  Neck: supple, no JVD  Respiratory: CTA b/l no wheezes, rales, ronchi. sufficient respiratory effort  Cardiovascular: +s1/s2, no murmurs, rubs, gallops  Abdomen: soft, nontender, nondistended  Extremities: 2+ pulses in distal extremities b/l  Skin: warm, dry, non cyanosis, clubbing, edema  Neurological: AAOx 3  L sided weakness 2/5 on LUE and LLE. Patient has right sided gaze preference, dysarthria. L sided facial droop. Sensory intact. Able to raise eyebrows.      LABS:                                          9.8    4.89  )-----------( 198      ( 29 Nov 2017 06:55 )             31.2     11-29    146<H>  |  113<H>  |  10  ----------------------------<  93  3.4<L>   |  21<L>  |  0.93    Ca    7.8<L>      29 Nov 2017 06:55  Mg     1.7     11-29          Consultant(s) Notes Reviewed:  MARTIN, cards Patient is a 89y old  Female who presents with a chief complaint of CVA (22 Nov 2017 09:17)      SUBJECTIVE / OVERNIGHT EVENTS: Reviewed events-   No fever overnight  Pt reports occ cough   Deneis dysuria or frequenncy  Daughter refused labs this AM       MEDICATIONS  (STANDING):  apixaban 2.5 milliGRAM(s) Oral every 12 hours  dextrose 5% + sodium chloride 0.3%. 1000 milliLiter(s) (70 mL/Hr) IV Continuous <Continuous>  lactobacillus acidophilus 1 Tablet(s) Oral three times a day with meals  metoprolol     tartrate 12.5 milliGRAM(s) Oral two times a day  pantoprazole  Injectable 40 milliGRAM(s) IV Push daily  piperacillin/tazobactam IVPB. 3.375 Gram(s) IV Intermittent every 8 hours  sucralfate 1 Gram(s) Oral four times a day    MEDICATIONS  (PRN):  acetaminophen   Tablet 650 milliGRAM(s) Oral every 6 hours PRN For Temp greater than 38 C (100.4 F)  acetaminophen   Tablet. 650 milliGRAM(s) Oral every 6 hours PRN Mild Pain (1 - 3)    Vital Signs Last 24 Hrs  T(C): 37.2 (30 Nov 2017 06:40), Max: 37.6 (29 Nov 2017 21:21)  T(F): 99 (30 Nov 2017 06:40), Max: 99.7 (29 Nov 2017 21:21)  HR: 63 (30 Nov 2017 06:40) (46 - 73)  BP: 134/56 (30 Nov 2017 06:40) (134/56 - 159/66)  BP(mean): --  RR: 18 (30 Nov 2017 06:40) (18 - 18)  SpO2: 100% (30 Nov 2017 06:40) (98% - 100%)    PHYSICAL EXAM:  General: NAD  HEENT: L sided facial droop. Rightward gaze preference. dry mucous membranes  Lymph Nodes: no cervical lymphadenopathy  Neck: supple, no JVD  Respiratory: CTA b/l no wheezes, rales, ronchi. sufficient respiratory effort  Cardiovascular: +s1/s2, no murmurs, rubs, gallops  Abdomen: soft, nontender, nondistended  Extremities: 2+ pulses in distal extremities b/l  Skin: warm, dry, non cyanosis, clubbing, edema  Neurological: AAOx 3  L sided weakness 2/5 on LUE and LLE. Patient has right sided gaze preference, dysarthria. L sided facial droop. Sensory intact. Able to raise eyebrows.      LABS:                                          9.8    4.89  )-----------( 198      ( 29 Nov 2017 06:55 )             31.2     11-29    146<H>  |  113<H>  |  10  ----------------------------<  93  3.4<L>   |  21<L>  |  0.93    Ca    7.8<L>      29 Nov 2017 06:55  Mg     1.7     11-29          Consultant(s) Notes Reviewed:  GI, cards

## 2017-11-30 NOTE — PROGRESS NOTE ADULT - PROBLEM SELECTOR PLAN 2
likely Acute CVA with aphasia, and left hemiparesis s/p TPA  s/p MICU   Findings  consistent with a RMCA stroke which is confirmed on repeat  CT head This was most likely an embolic event, suspect cardioembolism although not confirmed but pt at increased risk for Afib due to age and dilated left atrium  Carotid Ultrasound: no significant stenosis  Eliquis started at lower dose, as per family request. If tolerates low dose x 1 week, then family ok with increasing dose to full dose  Atorvastatin 80 mg  and DVT ppx    No indications for performing MRI at this time as it would not likely change the management  TTE negative for LV or Atrial thrombus.  HbA1c 5.4,

## 2017-11-30 NOTE — PROGRESS NOTE ADULT - PROBLEM SELECTOR PLAN 5
Pt with intermittent episodes of Paroxsymal Afib with RVR   EP on board- advise Metoprolol   Eliquis started yday Pt with intermittent episodes of Paroxsymal Afib with RVR   EP on board- advise Metoprolol   Eliquis started yday at low dose as per family request

## 2017-11-30 NOTE — PROGRESS NOTE ADULT - SUBJECTIVE AND OBJECTIVE BOX
Patient complains of fatigue but denies chest pain, palpitations or shortness of breath. + productive cough with white-yellow sputum.       Vital Signs Last 24 Hrs  T(C): 37.2 (30 Nov 2017 06:40), Max: 37.6 (29 Nov 2017 21:21)  T(F): 99 (30 Nov 2017 06:40), Max: 99.7 (29 Nov 2017 21:21)  HR: 63 (30 Nov 2017 06:40) (46 - 73)  BP: 134/56 (30 Nov 2017 06:40) (134/56 - 159/66)  BP(mean): --  RR: 18 (30 Nov 2017 06:40) (18 - 18)  SpO2: 100% (30 Nov 2017 06:40) (98% - 100%)      Telemetry: Normal sinus rhythm 72 b/min with 1st degree AVB  frequent APC's   MEDICATIONS  (STANDING):  apixaban 2.5 milliGRAM(s) Oral every 12 hours  artificial tears (preservative free) Ophthalmic Solution 1 Drop(s) Left EYE two times a day  dextrose 5% + sodium chloride 0.3%. 1000 milliLiter(s) (70 mL/Hr) IV Continuous <Continuous>  lactobacillus acidophilus 1 Tablet(s) Oral three times a day with meals  metoprolol     tartrate 12.5 milliGRAM(s) Oral two times a day  pantoprazole    Tablet 40 milliGRAM(s) Oral two times a day before meals  piperacillin/tazobactam IVPB. 3.375 Gram(s) IV Intermittent every 8 hours  sucralfate 1 Gram(s) Oral four times a day    MEDICATIONS  (PRN):  acetaminophen   Tablet 650 milliGRAM(s) Oral every 6 hours PRN For Temp greater than 38 C (100.4 F)  acetaminophen   Tablet. 650 milliGRAM(s) Oral every 6 hours PRN Mild Pain (1 - 3)          Physical exam:   Gen- Patient appears fatigued but in NAD.   Resp- decreased breath sounds left lung with wheezing throughtout  CV- Normal S1 and S2. RRR  ABD- obese nontender +bowel sounds.  EXT- no edema. Venodynes   Neuro- persistent facial droop and left sided weakness.                            10.9   5.34  )-----------( 225      ( 30 Nov 2017 11:30 )             35.3       11-29    146<H>  |  113<H>  |  10  ----------------------------<  93  3.4<L>   |  21<L>  |  0.93    Ca    7.8<L>      29 Nov 2017 06:55  Mg     1.7     11-29

## 2017-11-30 NOTE — DIETITIAN INITIAL EVALUATION ADULT. - PERTINENT LABORATORY DATA
11-30 Na143 mmol/L Glu 107 mg/dL<H> K+ 3.5 mmol/L Cr  0.90 mg/dL BUN 10 mg/dL Phos n/a   Alb n/a   PAB n/a

## 2017-11-30 NOTE — PROGRESS NOTE ADULT - PROBLEM SELECTOR PLAN 3
- pt has been off a/c since 12/2016 secondary to gib   - given the patients history of a life threatening gi bleed during her hospital stay in [place] and inability to undergo endoscopic evaluation d/t her cardiac comorbidities she would be at a moderate to high risk for rebleeding if anticoagulation were to be restarted  - discussed at length with daughter with agreement and understanding that given negative occult and stable h/h role for endoscopic evaluation would not be beneficial and risks of undergoing anesthesia outweigh the benefits  - recommend protonix bid while on eliquis and carafate

## 2017-11-30 NOTE — DIETITIAN INITIAL EVALUATION ADULT. - ADHERENCE
good/Pt's daughter states that Pt was eating very well prior to admission , regular consistency diet.

## 2017-12-01 LAB
-  AMIKACIN: SIGNIFICANT CHANGE UP
-  AMIKACIN: SIGNIFICANT CHANGE UP
-  AMPICILLIN/SULBACTAM: SIGNIFICANT CHANGE UP
-  AMPICILLIN/SULBACTAM: SIGNIFICANT CHANGE UP
-  AMPICILLIN: SIGNIFICANT CHANGE UP
-  AMPICILLIN: SIGNIFICANT CHANGE UP
-  AZTREONAM: SIGNIFICANT CHANGE UP
-  AZTREONAM: SIGNIFICANT CHANGE UP
-  CEFAZOLIN: SIGNIFICANT CHANGE UP
-  CEFAZOLIN: SIGNIFICANT CHANGE UP
-  CEFEPIME: SIGNIFICANT CHANGE UP
-  CEFEPIME: SIGNIFICANT CHANGE UP
-  CEFOXITIN: SIGNIFICANT CHANGE UP
-  CEFOXITIN: SIGNIFICANT CHANGE UP
-  CEFTAZIDIME: SIGNIFICANT CHANGE UP
-  CEFTAZIDIME: SIGNIFICANT CHANGE UP
-  CEFTRIAXONE: SIGNIFICANT CHANGE UP
-  CEFTRIAXONE: SIGNIFICANT CHANGE UP
-  CIPROFLOXACIN: SIGNIFICANT CHANGE UP
-  CIPROFLOXACIN: SIGNIFICANT CHANGE UP
-  ERTAPENEM: SIGNIFICANT CHANGE UP
-  ERTAPENEM: SIGNIFICANT CHANGE UP
-  GENTAMICIN: SIGNIFICANT CHANGE UP
-  GENTAMICIN: SIGNIFICANT CHANGE UP
-  IMIPENEM: SIGNIFICANT CHANGE UP
-  IMIPENEM: SIGNIFICANT CHANGE UP
-  LEVOFLOXACIN: SIGNIFICANT CHANGE UP
-  LEVOFLOXACIN: SIGNIFICANT CHANGE UP
-  MEROPENEM: SIGNIFICANT CHANGE UP
-  MEROPENEM: SIGNIFICANT CHANGE UP
-  NITROFURANTOIN: SIGNIFICANT CHANGE UP
-  NITROFURANTOIN: SIGNIFICANT CHANGE UP
-  PIPERACILLIN/TAZOBACTAM: SIGNIFICANT CHANGE UP
-  PIPERACILLIN/TAZOBACTAM: SIGNIFICANT CHANGE UP
-  TIGECYCLINE: SIGNIFICANT CHANGE UP
-  TIGECYCLINE: SIGNIFICANT CHANGE UP
-  TOBRAMYCIN: SIGNIFICANT CHANGE UP
-  TOBRAMYCIN: SIGNIFICANT CHANGE UP
-  TRIMETHOPRIM/SULFAMETHOXAZOLE: SIGNIFICANT CHANGE UP
-  TRIMETHOPRIM/SULFAMETHOXAZOLE: SIGNIFICANT CHANGE UP
BACTERIA UR CULT: SIGNIFICANT CHANGE UP
BASOPHILS # BLD AUTO: 0.07 K/UL — SIGNIFICANT CHANGE UP (ref 0–0.2)
BASOPHILS NFR BLD AUTO: 1.2 % — SIGNIFICANT CHANGE UP (ref 0–2)
BUN SERPL-MCNC: 10 MG/DL — SIGNIFICANT CHANGE UP (ref 7–23)
CALCIUM SERPL-MCNC: 8.8 MG/DL — SIGNIFICANT CHANGE UP (ref 8.4–10.5)
CHLORIDE SERPL-SCNC: 115 MMOL/L — HIGH (ref 98–107)
CO2 SERPL-SCNC: 22 MMOL/L — SIGNIFICANT CHANGE UP (ref 22–31)
CREAT SERPL-MCNC: 0.88 MG/DL — SIGNIFICANT CHANGE UP (ref 0.5–1.3)
EOSINOPHIL # BLD AUTO: 0.19 K/UL — SIGNIFICANT CHANGE UP (ref 0–0.5)
EOSINOPHIL NFR BLD AUTO: 3.2 % — SIGNIFICANT CHANGE UP (ref 0–6)
GLUCOSE SERPL-MCNC: 90 MG/DL — SIGNIFICANT CHANGE UP (ref 70–99)
HCT VFR BLD CALC: 36.6 % — SIGNIFICANT CHANGE UP (ref 34.5–45)
HGB BLD-MCNC: 11.1 G/DL — LOW (ref 11.5–15.5)
IMM GRANULOCYTES # BLD AUTO: 0.04 # — SIGNIFICANT CHANGE UP
IMM GRANULOCYTES NFR BLD AUTO: 0.7 % — SIGNIFICANT CHANGE UP (ref 0–1.5)
LYMPHOCYTES # BLD AUTO: 1.78 K/UL — SIGNIFICANT CHANGE UP (ref 1–3.3)
LYMPHOCYTES # BLD AUTO: 29.7 % — SIGNIFICANT CHANGE UP (ref 13–44)
MAGNESIUM SERPL-MCNC: 1.7 MG/DL — SIGNIFICANT CHANGE UP (ref 1.6–2.6)
MCHC RBC-ENTMCNC: 29.8 PG — SIGNIFICANT CHANGE UP (ref 27–34)
MCHC RBC-ENTMCNC: 30.3 % — LOW (ref 32–36)
MCV RBC AUTO: 98.4 FL — SIGNIFICANT CHANGE UP (ref 80–100)
METHOD TYPE: SIGNIFICANT CHANGE UP
MONOCYTES # BLD AUTO: 0.76 K/UL — SIGNIFICANT CHANGE UP (ref 0–0.9)
MONOCYTES NFR BLD AUTO: 12.7 % — SIGNIFICANT CHANGE UP (ref 2–14)
NEUTROPHILS # BLD AUTO: 3.15 K/UL — SIGNIFICANT CHANGE UP (ref 1.8–7.4)
NEUTROPHILS NFR BLD AUTO: 52.5 % — SIGNIFICANT CHANGE UP (ref 43–77)
NRBC # FLD: 0 — SIGNIFICANT CHANGE UP
ORGANISM # SPEC MICROSCOPIC CNT: SIGNIFICANT CHANGE UP
ORGANISM # SPEC MICROSCOPIC CNT: SIGNIFICANT CHANGE UP
PLATELET # BLD AUTO: 218 K/UL — SIGNIFICANT CHANGE UP (ref 150–400)
PMV BLD: 10 FL — SIGNIFICANT CHANGE UP (ref 7–13)
POTASSIUM SERPL-MCNC: 3.9 MMOL/L — SIGNIFICANT CHANGE UP (ref 3.5–5.3)
POTASSIUM SERPL-SCNC: 3.9 MMOL/L — SIGNIFICANT CHANGE UP (ref 3.5–5.3)
RBC # BLD: 3.72 M/UL — LOW (ref 3.8–5.2)
RBC # FLD: 15.2 % — HIGH (ref 10.3–14.5)
SODIUM SERPL-SCNC: 144 MMOL/L — SIGNIFICANT CHANGE UP (ref 135–145)
WBC # BLD: 5.99 K/UL — SIGNIFICANT CHANGE UP (ref 3.8–10.5)
WBC # FLD AUTO: 5.99 K/UL — SIGNIFICANT CHANGE UP (ref 3.8–10.5)

## 2017-12-01 PROCEDURE — 99232 SBSQ HOSP IP/OBS MODERATE 35: CPT | Mod: GC

## 2017-12-01 RX ORDER — APIXABAN 2.5 MG/1
5 TABLET, FILM COATED ORAL EVERY 12 HOURS
Qty: 0 | Refills: 0 | Status: DISCONTINUED | OUTPATIENT
Start: 2017-12-01 | End: 2017-12-05

## 2017-12-01 RX ADMIN — PANTOPRAZOLE SODIUM 40 MILLIGRAM(S): 20 TABLET, DELAYED RELEASE ORAL at 19:30

## 2017-12-01 RX ADMIN — Medication 1 TABLET(S): at 19:30

## 2017-12-01 RX ADMIN — PANTOPRAZOLE SODIUM 40 MILLIGRAM(S): 20 TABLET, DELAYED RELEASE ORAL at 05:49

## 2017-12-01 RX ADMIN — Medication 1 TABLET(S): at 09:38

## 2017-12-01 RX ADMIN — PIPERACILLIN AND TAZOBACTAM 25 GRAM(S): 4; .5 INJECTION, POWDER, LYOPHILIZED, FOR SOLUTION INTRAVENOUS at 21:50

## 2017-12-01 RX ADMIN — APIXABAN 2.5 MILLIGRAM(S): 2.5 TABLET, FILM COATED ORAL at 05:48

## 2017-12-01 RX ADMIN — Medication 1 DROP(S): at 05:48

## 2017-12-01 RX ADMIN — Medication 1 GRAM(S): at 05:49

## 2017-12-01 RX ADMIN — Medication 1 DROP(S): at 19:31

## 2017-12-01 RX ADMIN — APIXABAN 5 MILLIGRAM(S): 2.5 TABLET, FILM COATED ORAL at 19:29

## 2017-12-01 RX ADMIN — Medication 1 TABLET(S): at 13:52

## 2017-12-01 RX ADMIN — Medication 1 GRAM(S): at 13:52

## 2017-12-01 RX ADMIN — PIPERACILLIN AND TAZOBACTAM 25 GRAM(S): 4; .5 INJECTION, POWDER, LYOPHILIZED, FOR SOLUTION INTRAVENOUS at 13:55

## 2017-12-01 RX ADMIN — Medication 1 GRAM(S): at 19:31

## 2017-12-01 RX ADMIN — Medication 12.5 MILLIGRAM(S): at 19:33

## 2017-12-01 RX ADMIN — PIPERACILLIN AND TAZOBACTAM 25 GRAM(S): 4; .5 INJECTION, POWDER, LYOPHILIZED, FOR SOLUTION INTRAVENOUS at 05:49

## 2017-12-01 NOTE — PROGRESS NOTE ADULT - PROBLEM SELECTOR PLAN 1
concerning for gram negative PNA   likely secondary to Aspiration PNA  vs UTI   Improving.  Tmax of 99 overnight   Chest Xray reviewed- Bibasilar and retrocardiac opacity, greater on the left, suggesting small pleural effusions with likely associated passive atelectasis. Underlying atelectasis of other cause and/or pneumonia is not excluded.  UA - positive LE with > 50 WBCs,UC- klebsiella    BC- NGTD  Pt  on Zosyn/Incentive spirometry ordered  Encourage OOB to chair as tolerated     ID on board concerning for gram negative PNA   likely secondary to Aspiration PNA  vs UTI   Improving.  Tmax of 99 overnight   Chest Xray reviewed- Bibasilar and retrocardiac opacity, greater on the left, suggesting small pleural effusions with likely associated passive atelectasis. Underlying atelectasis of other cause and/or pneumonia is not excluded.  UA - positive LE with > 50 WBCs,UC- klebsiella    BC- NGTD  Pt  on Zosyn/Incentive spirometry ordered  Encourage OOB to chair as tolerated   ID on board- Recommend 7 days of Abx

## 2017-12-01 NOTE — PROGRESS NOTE ADULT - ASSESSMENT
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 Patient developed fever to 100.6 on 11/21. Given persistent fever started on Zosyn on 11/28. U/A on 11/28 with > 50 WBC. CXR with possible pneumonia. Last febrile to Tmax of 102.8 on the night of 11/28. Likely aspiration PNA which is improving on Zosyn. UCx with two Klebsiella pneum species which are sensitive to Zosyn.  --Continue Zosyn; can transition to Augmentin on discharge to complete 7 day course

## 2017-12-01 NOTE — PROGRESS NOTE ADULT - ATTENDING COMMENTS
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     CT Head with new right MCA infarct. Received TPA in the ED with infiltrated IV during administration of TPA.     Patient developed fevers. Workup included a UA with > 50 WBC. CXR with possible pneumonia. Esophagram demonstrating aspiration. Bld cxs NGTD and urine cx with GNR >100K.    Now afebrile. WBC WNL.    Assessment:  -Fevers possibly from aspiration PNA/pneumonitis vs UTI  -Right MCA infarct      Recommend:  -Continue zosyn.  -Can transition to augmentin /125 mg PO BID to complete 12/4/2017.    Please call with questions.    Johnnie Martinez MD  Pager [phone]  After 5pm/weekends call [phone] .

## 2017-12-01 NOTE — PROGRESS NOTE ADULT - ATTENDING COMMENTS
Discussed with daughter about increasing dose to 5 mg BID as pt tolerating regimen with stable HH and no overt bleeding   Daughters to discuss and revert

## 2017-12-01 NOTE — PROGRESS NOTE ADULT - SUBJECTIVE AND OBJECTIVE BOX
Patient is a 89y old  Female who presents with a chief complaint of CVA (22 Nov 2017 09:17)      SUBJECTIVE / OVERNIGHT EVENTS:       MEDICATIONS  (STANDING):  apixaban 2.5 milliGRAM(s) Oral every 12 hours  artificial tears (preservative free) Ophthalmic Solution 1 Drop(s) Left EYE two times a day  lactobacillus acidophilus 1 Tablet(s) Oral three times a day with meals  metoprolol     tartrate 12.5 milliGRAM(s) Oral two times a day  pantoprazole    Tablet 40 milliGRAM(s) Oral two times a day before meals  piperacillin/tazobactam IVPB. 3.375 Gram(s) IV Intermittent every 8 hours  sucralfate 1 Gram(s) Oral four times a day    MEDICATIONS  (PRN):  acetaminophen   Tablet 650 milliGRAM(s) Oral every 6 hours PRN For Temp greater than 38 C (100.4 F)  acetaminophen   Tablet. 650 milliGRAM(s) Oral every 6 hours PRN Mild Pain (1 - 3)    Vital Signs Last 24 Hrs  T(C): 36.8 (01 Dec 2017 09:36), Max: 37.4 (30 Nov 2017 21:58)  T(F): 98.2 (01 Dec 2017 09:36), Max: 99.3 (30 Nov 2017 21:58)  HR: 59 (01 Dec 2017 09:36) (53 - 66)  BP: 129/46 (01 Dec 2017 09:36) (120/62 - 145/54)  BP(mean): --  RR: 18 (01 Dec 2017 09:36) (18 - 18)  SpO2: 98% (01 Dec 2017 09:36) (98% - 100%)    PHYSICAL EXAM:  General: NAD  HEENT: L sided facial droop. Rightward gaze preference. dry mucous membranes  Lymph Nodes: no cervical lymphadenopathy  Neck: supple, no JVD  Respiratory: CTA b/l no wheezes, rales, ronchi. sufficient respiratory effort  Cardiovascular: +s1/s2, no murmurs, rubs, gallops  Abdomen: soft, nontender, nondistended  Extremities: 2+ pulses in distal extremities b/l  Skin: warm, dry, non cyanosis, clubbing, edema  Neurological: AAOx 3  L sided weakness 2/5 on LUE and LLE. Patient has right sided gaze preference, dysarthria. L sided facial droop. Sensory intact. Able to raise eyebrows.      LABS:                                                            11.1   5.99  )-----------( 218      ( 01 Dec 2017 07:31 )             36.6     12-01    144  |  115<H>  |  10  ----------------------------<  90  3.9   |  22  |  0.88    Ca    8.8      01 Dec 2017 07:20  Mg     1.7     12-01        Consultant(s) Notes Reviewed:  anita SALAZAR Patient is a 89y old  Female who presents with a chief complaint of CVA (22 Nov 2017 09:17)      SUBJECTIVE / OVERNIGHT EVENTS: Pt seen and examined by me Daughter at bedside  Pt doing much better, more awake, verbal. Still with occasional cough  Denies abdominal pain/N/Vomiting       MEDICATIONS  (STANDING):  apixaban 2.5 milliGRAM(s) Oral every 12 hours  artificial tears (preservative free) Ophthalmic Solution 1 Drop(s) Left EYE two times a day  lactobacillus acidophilus 1 Tablet(s) Oral three times a day with meals  metoprolol     tartrate 12.5 milliGRAM(s) Oral two times a day  pantoprazole    Tablet 40 milliGRAM(s) Oral two times a day before meals  piperacillin/tazobactam IVPB. 3.375 Gram(s) IV Intermittent every 8 hours  sucralfate 1 Gram(s) Oral four times a day    MEDICATIONS  (PRN):  acetaminophen   Tablet 650 milliGRAM(s) Oral every 6 hours PRN For Temp greater than 38 C (100.4 F)  acetaminophen   Tablet. 650 milliGRAM(s) Oral every 6 hours PRN Mild Pain (1 - 3)    Vital Signs Last 24 Hrs  T(C): 36.8 (01 Dec 2017 09:36), Max: 37.4 (30 Nov 2017 21:58)  T(F): 98.2 (01 Dec 2017 09:36), Max: 99.3 (30 Nov 2017 21:58)  HR: 59 (01 Dec 2017 09:36) (53 - 66)  BP: 129/46 (01 Dec 2017 09:36) (120/62 - 145/54)  BP(mean): --  RR: 18 (01 Dec 2017 09:36) (18 - 18)  SpO2: 98% (01 Dec 2017 09:36) (98% - 100%)    PHYSICAL EXAM:  General: NAD  HEENT: L sided facial droop. Rightward gaze preference. dry mucous membranes  Lymph Nodes: no cervical lymphadenopathy  Neck: supple, no JVD  Respiratory: CTA b/l no wheezes, rales, ronchi. sufficient respiratory effort  Cardiovascular: +s1/s2, no murmurs, rubs, gallops  Abdomen: soft, nontender, nondistended  Extremities: 2+ pulses in distal extremities b/l  Skin: warm, dry, non cyanosis, clubbing, edema  Neurological: AAOx 3  L sided weakness 2/5 on LUE and LLE. Patient has right sided gaze preference, dysarthria. L sided facial droop. Sensory intact. Able to raise eyebrows.      LABS:                                                            11.1   5.99  )-----------( 218      ( 01 Dec 2017 07:31 )             36.6     12-01    144  |  115<H>  |  10  ----------------------------<  90  3.9   |  22  |  0.88    Ca    8.8      01 Dec 2017 07:20  Mg     1.7     12-01        Consultant(s) Notes Reviewed:  GI, cards

## 2017-12-01 NOTE — PROGRESS NOTE ADULT - PROBLEM SELECTOR PLAN 3
- pt has been off a/c since 12/2016 secondary to gib   - given the patients history of a life threatening gi bleed during her hospital stay in [place] and inability to undergo endoscopic evaluation d/t her cardiac comorbidities she would be at a moderate to high risk for rebleeding if anticoagulation were to be restarted  - discussed at length with daughter with agreement and understanding that given negative occult and stable h/h role for endoscopic evaluation would not be beneficial and risks of undergoing anesthesia outweigh the benefits  - cont protonix bid while on eliquis and carafate

## 2017-12-01 NOTE — PROGRESS NOTE ADULT - SUBJECTIVE AND OBJECTIVE BOX
Follow Up:  Fever    Interval History/ROS: No chills or fevers overnight. Minimal cough. No N/V/D. Daughter noted that Lily looks more alert today.     Allergies  ACE inhibitors (Angioedema)  morphine (Unknown)        ANTIMICROBIALS:  piperacillin/tazobactam IVPB. 3.375 every 8 hours      OTHER MEDS:  MEDICATIONS  (STANDING):  acetaminophen   Tablet 650 every 6 hours PRN  acetaminophen   Tablet. 650 every 6 hours PRN  apixaban 2.5 every 12 hours  metoprolol     tartrate 12.5 two times a day  pantoprazole    Tablet 40 two times a day before meals  sucralfate 1 four times a day      Vital Signs Last 24 Hrs  T(C): 36.8 (01 Dec 2017 09:36), Max: 37.4 (30 Nov 2017 21:58)  T(F): 98.2 (01 Dec 2017 09:36), Max: 99.3 (30 Nov 2017 21:58)  HR: 59 (01 Dec 2017 09:36) (53 - 66)  BP: 129/46 (01 Dec 2017 09:36) (120/62 - 145/54)  BP(mean): --  RR: 18 (01 Dec 2017 09:36) (18 - 18)  SpO2: 98% (01 Dec 2017 09:36) (98% - 100%)    PHYSICAL EXAMINATION:  General: Alert and Awake, NAD  HEENT: PERRL, EOMI, Minimal L conjunctival erythema and crusting, Oropharynx Clear, MMM  Cardiac: RRR, No M/R/G  Resp: Decreased BS at left lung base, No wh/rh/ra  Abdomen: Obese, NT/ND, No HSM, No rigidity or guarding  MSK: No LE edema. No calf tenderness  : No elmore  Skin: No rashes or lesions. Skin is warm and dry to the touch.   Neuro: Alert and Awake. L-sided facial droop, otherwise CN 2-12 Grossly intact. Moves all four extremities spontaneously.   Psych: Calm, Pleasant, Cooperative                          11.1   5.99  )-----------( 218      ( 01 Dec 2017 07:31 )             36.6       12-01    144  |  115<H>  |  10  ----------------------------<  90  3.9   |  22  |  0.88    Ca    8.8      01 Dec 2017 07:20  Mg     1.7     12-01      MICROBIOLOGY:  v  URINE MIDSTREAM  11-28-17 --  --  Klebsiella pneumoniae  Klebsiella pneumoniae 2      BLOOD VENOUS  11-27-17 --  --  --    RADIOLOGY:    No new images.

## 2017-12-01 NOTE — PROGRESS NOTE ADULT - PROBLEM SELECTOR PLAN 7
pt had been on MTX, Prednisone and Leflunomide  Daughter wish to hold off Predni as pt started on AC  Also restart MTX/Leflunomide once infection resolved  Daughters aware

## 2017-12-01 NOTE — PROGRESS NOTE ADULT - SUBJECTIVE AND OBJECTIVE BOX
INTERVAL HPI/OVERNIGHT EVENTS:    pt remains free of abdominal events  tolerating po intake with the assistance of her daughter  no episodes of melena or brbpr    MEDICATIONS  (STANDING):  apixaban 2.5 milliGRAM(s) Oral every 12 hours  artificial tears (preservative free) Ophthalmic Solution 1 Drop(s) Left EYE two times a day  lactobacillus acidophilus 1 Tablet(s) Oral three times a day with meals  metoprolol     tartrate 12.5 milliGRAM(s) Oral two times a day  pantoprazole    Tablet 40 milliGRAM(s) Oral two times a day before meals  piperacillin/tazobactam IVPB. 3.375 Gram(s) IV Intermittent every 8 hours  sucralfate 1 Gram(s) Oral four times a day    MEDICATIONS  (PRN):  acetaminophen   Tablet 650 milliGRAM(s) Oral every 6 hours PRN For Temp greater than 38 C (100.4 F)  acetaminophen   Tablet. 650 milliGRAM(s) Oral every 6 hours PRN Mild Pain (1 - 3)      Allergies    ACE inhibitors (Angioedema)  morphine (Unknown)    Intolerances    caffeine (Stomach Upset)  lactose (Other)      Review of Systems: *per daughter    General:  No wt loss, fevers, chills, night sweats, fatigue   Eyes:  Good vision, no reported pain  ENT:  No sore throat, pain, runny nose, dysphagia  CV:  No pain, palpitations, hypo/hypertension  Resp:  No dyspnea, cough, tachypnea, wheezing  GI:  No pain, No nausea, No vomiting, No diarrhea, No constipation, No weight loss, No fever, No pruritis, No rectal bleeding, No melena, No dysphagia  :  No pain, bleeding, incontinence, nocturia  Muscle:  No pain, weakness  Neuro:  No weakness, tingling, memory problems  Psych:  No fatigue, insomnia, mood problems, depression  Endocrine:  No polyuria, polydypsia, cold/heat intolerance  Heme:  No petechiae, ecchymosis, easy bruisability  Skin:  No rash, tattoos, scars, edema      Vital Signs Last 24 Hrs  T(C): 36.8 (01 Dec 2017 09:36), Max: 37.4 (30 Nov 2017 21:58)  T(F): 98.2 (01 Dec 2017 09:36), Max: 99.3 (30 Nov 2017 21:58)  HR: 59 (01 Dec 2017 09:36) (53 - 66)  BP: 129/46 (01 Dec 2017 09:36) (120/62 - 145/54)  BP(mean): --  RR: 18 (01 Dec 2017 09:36) (18 - 18)  SpO2: 98% (01 Dec 2017 09:36) (98% - 100%)    PHYSICAL EXAM:    Constitutional: NAD  HEENT: EOMI, throat clear  Neck: No LAD, supple  Respiratory: CTA and P  Cardiovascular: S1 and S2, RRR, no M  Gastrointestinal: BS+, soft, NT/ND, neg HSM,  Extremities: No peripheral edema, neg clubbing, cyanosis  Vascular: 2+ peripheral pulses  Neurological: A/O x 1-2, no focal deficits  Psychiatric: Normal mood, normal affect  Skin: No rashes      LABS:                        11.1   5.99  )-----------( 218      ( 01 Dec 2017 07:31 )             36.6     12-01    144  |  115<H>  |  10  ----------------------------<  90  3.9   |  22  |  0.88    Ca    8.8      01 Dec 2017 07:20  Mg     1.7     12-01            RADIOLOGY & ADDITIONAL TESTS:

## 2017-12-01 NOTE — PROGRESS NOTE ADULT - PROBLEM SELECTOR PLAN 5
Pt with intermittent episodes of Paroxsymal Afib with RVR   EP on board- advise Metoprolol   Eliquis started yday at low dose as per family request

## 2017-12-02 LAB
BACTERIA BLD CULT: SIGNIFICANT CHANGE UP
BACTERIA BLD CULT: SIGNIFICANT CHANGE UP
BUN SERPL-MCNC: 12 MG/DL — SIGNIFICANT CHANGE UP (ref 7–23)
CALCIUM SERPL-MCNC: 8.5 MG/DL — SIGNIFICANT CHANGE UP (ref 8.4–10.5)
CHLORIDE SERPL-SCNC: 109 MMOL/L — HIGH (ref 98–107)
CO2 SERPL-SCNC: 24 MMOL/L — SIGNIFICANT CHANGE UP (ref 22–31)
CREAT SERPL-MCNC: 0.89 MG/DL — SIGNIFICANT CHANGE UP (ref 0.5–1.3)
GLUCOSE SERPL-MCNC: 79 MG/DL — SIGNIFICANT CHANGE UP (ref 70–99)
HCT VFR BLD CALC: 30.3 % — LOW (ref 34.5–45)
HGB BLD-MCNC: 9.7 G/DL — LOW (ref 11.5–15.5)
MAGNESIUM SERPL-MCNC: 1.6 MG/DL — SIGNIFICANT CHANGE UP (ref 1.6–2.6)
MCHC RBC-ENTMCNC: 30.6 PG — SIGNIFICANT CHANGE UP (ref 27–34)
MCHC RBC-ENTMCNC: 32 % — SIGNIFICANT CHANGE UP (ref 32–36)
MCV RBC AUTO: 95.6 FL — SIGNIFICANT CHANGE UP (ref 80–100)
NRBC # FLD: 0 — SIGNIFICANT CHANGE UP
PLATELET # BLD AUTO: 231 K/UL — SIGNIFICANT CHANGE UP (ref 150–400)
PMV BLD: 9.8 FL — SIGNIFICANT CHANGE UP (ref 7–13)
POTASSIUM SERPL-MCNC: 3.4 MMOL/L — LOW (ref 3.5–5.3)
POTASSIUM SERPL-SCNC: 3.4 MMOL/L — LOW (ref 3.5–5.3)
RBC # BLD: 3.17 M/UL — LOW (ref 3.8–5.2)
RBC # FLD: 15.2 % — HIGH (ref 10.3–14.5)
SODIUM SERPL-SCNC: 144 MMOL/L — SIGNIFICANT CHANGE UP (ref 135–145)
WBC # BLD: 5.93 K/UL — SIGNIFICANT CHANGE UP (ref 3.8–10.5)
WBC # FLD AUTO: 5.93 K/UL — SIGNIFICANT CHANGE UP (ref 3.8–10.5)

## 2017-12-02 PROCEDURE — 99233 SBSQ HOSP IP/OBS HIGH 50: CPT

## 2017-12-02 RX ADMIN — APIXABAN 5 MILLIGRAM(S): 2.5 TABLET, FILM COATED ORAL at 18:10

## 2017-12-02 RX ADMIN — PIPERACILLIN AND TAZOBACTAM 25 GRAM(S): 4; .5 INJECTION, POWDER, LYOPHILIZED, FOR SOLUTION INTRAVENOUS at 14:48

## 2017-12-02 RX ADMIN — Medication 1 GRAM(S): at 11:56

## 2017-12-02 RX ADMIN — Medication 1 GRAM(S): at 23:08

## 2017-12-02 RX ADMIN — Medication 1 GRAM(S): at 06:50

## 2017-12-02 RX ADMIN — Medication 1 DROP(S): at 06:50

## 2017-12-02 RX ADMIN — Medication 12.5 MILLIGRAM(S): at 06:49

## 2017-12-02 RX ADMIN — PIPERACILLIN AND TAZOBACTAM 25 GRAM(S): 4; .5 INJECTION, POWDER, LYOPHILIZED, FOR SOLUTION INTRAVENOUS at 23:08

## 2017-12-02 RX ADMIN — PANTOPRAZOLE SODIUM 40 MILLIGRAM(S): 20 TABLET, DELAYED RELEASE ORAL at 18:10

## 2017-12-02 RX ADMIN — Medication 1 TABLET(S): at 09:41

## 2017-12-02 RX ADMIN — PANTOPRAZOLE SODIUM 40 MILLIGRAM(S): 20 TABLET, DELAYED RELEASE ORAL at 06:49

## 2017-12-02 RX ADMIN — Medication 1 GRAM(S): at 00:14

## 2017-12-02 RX ADMIN — Medication 12.5 MILLIGRAM(S): at 18:10

## 2017-12-02 RX ADMIN — Medication 1 GRAM(S): at 18:10

## 2017-12-02 RX ADMIN — Medication 1 TABLET(S): at 11:56

## 2017-12-02 RX ADMIN — Medication 1 DROP(S): at 18:14

## 2017-12-02 RX ADMIN — APIXABAN 5 MILLIGRAM(S): 2.5 TABLET, FILM COATED ORAL at 06:50

## 2017-12-02 RX ADMIN — PIPERACILLIN AND TAZOBACTAM 25 GRAM(S): 4; .5 INJECTION, POWDER, LYOPHILIZED, FOR SOLUTION INTRAVENOUS at 06:49

## 2017-12-02 RX ADMIN — Medication 1 TABLET(S): at 18:10

## 2017-12-02 NOTE — PROGRESS NOTE ADULT - SUBJECTIVE AND OBJECTIVE BOX
INTERVAL HPI/OVERNIGHT EVENTS:    pt seen with daughter present who is concerned over cost of Eliquis, her pharmacy told her it would be $360/month   pt communicated she is feeling well and having no difficulty with her breakfast  no abdominal pain   pt with daily bm's without any melena or brbpr noted  hgb trickling downward this morning    MEDICATIONS  (STANDING):  apixaban 5 milliGRAM(s) Oral every 12 hours  artificial tears (preservative free) Ophthalmic Solution 1 Drop(s) Left EYE two times a day  lactobacillus acidophilus 1 Tablet(s) Oral three times a day with meals  metoprolol     tartrate 12.5 milliGRAM(s) Oral two times a day  pantoprazole    Tablet 40 milliGRAM(s) Oral two times a day before meals  piperacillin/tazobactam IVPB. 3.375 Gram(s) IV Intermittent every 8 hours  sucralfate 1 Gram(s) Oral four times a day    MEDICATIONS  (PRN):  acetaminophen   Tablet 650 milliGRAM(s) Oral every 6 hours PRN For Temp greater than 38 C (100.4 F)  acetaminophen   Tablet. 650 milliGRAM(s) Oral every 6 hours PRN Mild Pain (1 - 3)      Allergies    ACE inhibitors (Angioedema)  morphine (Unknown)    Intolerances    caffeine (Stomach Upset)  lactose (Other)      Review of Systems:    General:  No wt loss, fevers, chills, night sweats, fatigue   Eyes:  Good vision, no reported pain  ENT:  No sore throat, pain, runny nose, dysphagia  CV:  No pain, palpitations, hypo/hypertension  Resp:  No dyspnea, cough, tachypnea, wheezing  GI:  No pain, No nausea, No vomiting, No diarrhea, No constipation, No weight loss, No fever, No pruritis, No rectal bleeding, No melena, No dysphagia  :  No pain, bleeding, incontinence, nocturia  Muscle:  No pain, weakness  Neuro:  No weakness, tingling, memory problems  Psych:  No fatigue, insomnia, mood problems, depression  Endocrine:  No polyuria, polydypsia, cold/heat intolerance  Heme:  No petechiae, ecchymosis, easy bruisability  Skin:  No rash, tattoos, scars, edema      Vital Signs Last 24 Hrs  T(C): 36.7 (02 Dec 2017 06:22), Max: 36.9 (01 Dec 2017 21:48)  T(F): 98 (02 Dec 2017 06:22), Max: 98.4 (01 Dec 2017 21:48)  HR: 57 (02 Dec 2017 06:22) (57 - 66)  BP: 125/45 (02 Dec 2017 06:22) (125/45 - 144/54)  BP(mean): --  RR: 18 (02 Dec 2017 06:22) (18 - 18)  SpO2: 96% (02 Dec 2017 06:22) (96% - 100%)    PHYSICAL EXAM:    Constitutional: NAD  HEENT: EOMI, throat clear  Neck: No LAD, supple  Respiratory: CTA and P  Cardiovascular: S1 and S2, RRR, no M  Gastrointestinal: BS+, soft, NT/ND, neg HSM,  Extremities: No peripheral edema, neg clubbing, cyanosis  Vascular: 2+ peripheral pulses  Neurological: A/O x 2-3, no focal deficits  Psychiatric: Normal mood, normal affect  Skin: No rashes      LABS:                        9.7    5.93  )-----------( 231      ( 02 Dec 2017 06:50 )             30.3     12-02    144  |  109<H>  |  12  ----------------------------<  79  3.4<L>   |  24  |  0.89    Ca    8.5      02 Dec 2017 06:50  Mg     1.6     12-02            RADIOLOGY & ADDITIONAL TESTS:

## 2017-12-02 NOTE — PROGRESS NOTE ADULT - PROBLEM SELECTOR PLAN 1
- new right MCA infarct s/p TPA in ED   - neurology workup appreciated  - TTE negative for LV or Atrial thrombus  - given the patients history of a life threatening gi bleed during her hospital stay in [place] and inability to undergo endoscopic evaluation d/t her cardiac comorbidities she would be at a moderate to high risk for rebleeding if anticoagulation were to be restarted  - occult negative (-) and has no overt/occult signs of GI bleeding   - Video Capsule EGD not performed as patient did not want to attempt swallowing due to size of the capsule  - discussed at length with daughter and patient with agreement and understanding that given negative occult and stable h/h role for endoscopic evaluation would not be beneficial and risks of undergoing anesthesia outweigh the benefits  - cont protonix bid while on a/c and carafate  - trend cbc as hgb trickling down this morning, 12/2, to 9.7 from 11.1 (no overt bleeding)

## 2017-12-02 NOTE — PROGRESS NOTE ADULT - PROBLEM SELECTOR PLAN 2
- acute right MCA CVA noted on CT scan confirmed on repeat CT head  - s/p tPA and MICU monitoring  - suspicion for cardioembolism (not confirmed) as patient increased risk (high [id number] scre)  - no significant stenosis on carotid ultrasound  - TTE negative for LV or atrial thrombus  - c/w statin   - on eliquis 5 mg bid, d/w daughter in agreement (patient at higher risk of bleeding), continue to monitor CBC (trickling down 11.1 to 9.7 today)

## 2017-12-02 NOTE — PROGRESS NOTE ADULT - SUBJECTIVE AND OBJECTIVE BOX
Patient is a 89y old  Female who presents with a chief complaint of CVA (22 Nov 2017 09:17)      INTERVAL HPI/OVERNIGHT EVENTS:    MEDICATIONS  (STANDING):  apixaban 5 milliGRAM(s) Oral every 12 hours  artificial tears (preservative free) Ophthalmic Solution 1 Drop(s) Left EYE two times a day  lactobacillus acidophilus 1 Tablet(s) Oral three times a day with meals  metoprolol     tartrate 12.5 milliGRAM(s) Oral two times a day  pantoprazole    Tablet 40 milliGRAM(s) Oral two times a day before meals  piperacillin/tazobactam IVPB. 3.375 Gram(s) IV Intermittent every 8 hours  sucralfate 1 Gram(s) Oral four times a day    MEDICATIONS  (PRN):  acetaminophen   Tablet 650 milliGRAM(s) Oral every 6 hours PRN For Temp greater than 38 C (100.4 F)  acetaminophen   Tablet. 650 milliGRAM(s) Oral every 6 hours PRN Mild Pain (1 - 3)      Allergies    ACE inhibitors (Angioedema)  morphine (Unknown)    Intolerances    caffeine (Stomach Upset)  lactose (Other)      REVIEW OF SYSTEMS:  Please see interval HPI:    Vital Signs Last 24 Hrs  T(C): 37.4 (02 Dec 2017 15:30), Max: 37.4 (02 Dec 2017 15:30)  T(F): 99.4 (02 Dec 2017 15:30), Max: 99.4 (02 Dec 2017 15:30)  HR: 55 (02 Dec 2017 15:30) (55 - 66)  BP: 125/55 (02 Dec 2017 15:30) (125/45 - 144/54)  BP(mean): --  RR: 17 (02 Dec 2017 15:30) (17 - 18)  SpO2: 98% (02 Dec 2017 15:30) (96% - 100%)  I&O's Detail        PHYSICAL EXAM:  GENERAL:   HEAD:    EYES:   ENMT:   NECK:   NERVOUS SYSTEM:    CHEST/LUNG:   HEART:   ABDOMEN:   EXTREMITIES:    LYMPH:   SKIN:     LABS:                        9.7    5.93  )-----------( 231      ( 02 Dec 2017 06:50 )             30.3     02 Dec 2017 06:50    144    |  109    |  12     ----------------------------<  79     3.4     |  24     |  0.89     Ca    8.5        02 Dec 2017 06:50  Mg     1.6       02 Dec 2017 06:50        CAPILLARY BLOOD GLUCOSE        BLOOD CULTURE    RADIOLOGY & ADDITIONAL TESTS:    Imaging Personally Reviewed:  [ ] YES     Consultant(s) Notes Reviewed:      Care Discussed with Consultants/Other Providers: Patient is a 89y old  Female who presents with a chief complaint of CVA (22 Nov 2017 09:17)    INTERVAL HPI/OVERNIGHT EVENTS:  No acute events overnight. Daughter at bedside with multiple questions, patient without complaints. Pending rehab placement.   Daughter hesitant to treat patient with medications (given age, concern for side effects), understands need to f/u PMD for further monitoring. Anticipatory guidance provided re: side effects. Daughter prefers eliquis over other medications (such as coumadin - too many blood draws).     MEDICATIONS  (STANDING):  apixaban 5 milliGRAM(s) Oral every 12 hours  artificial tears (preservative free) Ophthalmic Solution 1 Drop(s) Left EYE two times a day  lactobacillus acidophilus 1 Tablet(s) Oral three times a day with meals  metoprolol     tartrate 12.5 milliGRAM(s) Oral two times a day  pantoprazole    Tablet 40 milliGRAM(s) Oral two times a day before meals  piperacillin/tazobactam IVPB. 3.375 Gram(s) IV Intermittent every 8 hours  sucralfate 1 Gram(s) Oral four times a day    MEDICATIONS  (PRN):  acetaminophen   Tablet 650 milliGRAM(s) Oral every 6 hours PRN For Temp greater than 38 C (100.4 F)  acetaminophen   Tablet. 650 milliGRAM(s) Oral every 6 hours PRN Mild Pain (1 - 3)    Allergies    ACE inhibitors (Angioedema)  morphine (Unknown)    Intolerances    caffeine (Stomach Upset)  lactose (Other)    REVIEW OF SYSTEMS:  Please see interval HPI:    Vital Signs Last 24 Hrs  T(C): 37.4 (02 Dec 2017 15:30), Max: 37.4 (02 Dec 2017 15:30)  T(F): 99.4 (02 Dec 2017 15:30), Max: 99.4 (02 Dec 2017 15:30)  HR: 55 (02 Dec 2017 15:30) (55 - 66)  BP: 125/55 (02 Dec 2017 15:30) (125/45 - 144/54)  BP(mean): --  RR: 17 (02 Dec 2017 15:30) (17 - 18)  SpO2: 98% (02 Dec 2017 15:30) (96% - 100%)  I&O's Detail    PHYSICAL EXAM:  GENERAL: NAD, lying in bed  HEAD:  NC/AT  EYES: EOMI  ENMT: MMM  NECK: supple  NERVOUS SYSTEM:  left sided facial droop, moving all extremities  CHEST/LUNG: No respiratory distress on RA, clear anteriorly  HEART: S1S2 RRR  ABDOMEN: soft, non-tender +BS  EXTREMITIES:  no c/c/e      LABS:                        9.7    5.93  )-----------( 231      ( 02 Dec 2017 06:50 )             30.3     02 Dec 2017 06:50    144    |  109    |  12     ----------------------------<  79     3.4     |  24     |  0.89     Ca    8.5        02 Dec 2017 06:50  Mg     1.6       02 Dec 2017 06:50    CAPILLARY BLOOD GLUCOSE    BLOOD CULTURE    RADIOLOGY & ADDITIONAL TESTS:  Telemetry personally reviewed: sinus 50s    Imaging Personally Reviewed:  [ ] YES   CT head: acute right MCA infarct, no hemorrhage    Consultant(s) Notes Reviewed:  GI, ID, EP    Care Discussed with Consultants/Other Providers:

## 2017-12-02 NOTE — PROGRESS NOTE ADULT - PROBLEM SELECTOR PLAN 3
- pt has been off a/c since 12/2016 secondary to gib   - given the patients history of a life threatening gi bleed during her hospital stay in [place] and inability to undergo endoscopic evaluation d/t her cardiac comorbidities she would be at a moderate to high risk for rebleeding if anticoagulation were to be restarted  - discussed at length with daughter with agreement and understanding that given negative occult and stable h/h role for endoscopic evaluation would not be beneficial and risks of undergoing anesthesia outweigh the benefits  - cont protonix bid while on a/c and carafate bid  - trend cbc as hgb trickling down this morning, 12/2, to 9.7 from 11.1 (no overt bleeding)

## 2017-12-02 NOTE — PROGRESS NOTE ADULT - PROBLEM SELECTOR PLAN 6
- in past has been on MTX, prednisone and leflunomide  - hold prednisone as started on A/C per daughter  - consider restarting MTX/leflunomide once infection resolved

## 2017-12-02 NOTE — PROGRESS NOTE ADULT - ATTENDING COMMENTS
Prior to discharge, should price out eliquis, see if affordable for patient/family, otherwise may need to consider other A/C options... Unclear if daughter would want coumadin given need for INR monitoring...

## 2017-12-02 NOTE — PROGRESS NOTE ADULT - PROBLEM SELECTOR PLAN 4
- on metoprolol for rate control as per EP (hold parameters for BP and HR)  - c/w eliquis for A/C  - appreciate GI recs, c/w protonix bid and carafate while on A/C  - continue to monitor CBC

## 2017-12-02 NOTE — PROGRESS NOTE ADULT - PROBLEM SELECTOR PLAN 1
- currently afebrile, without leukocytosis  - on zosyn to cover gram negative PNA (concern for aspiration) as well as Klebsiella UTI  - appreciate ID recs,   - plan to complete 7 day course of abx, upon discharge can transition to PO augmentin bid as per ID (last day of abx 12/4)

## 2017-12-03 LAB
BUN SERPL-MCNC: 11 MG/DL — SIGNIFICANT CHANGE UP (ref 7–23)
CALCIUM SERPL-MCNC: 8.8 MG/DL — SIGNIFICANT CHANGE UP (ref 8.4–10.5)
CHLORIDE SERPL-SCNC: 107 MMOL/L — SIGNIFICANT CHANGE UP (ref 98–107)
CO2 SERPL-SCNC: 23 MMOL/L — SIGNIFICANT CHANGE UP (ref 22–31)
CREAT SERPL-MCNC: 0.91 MG/DL — SIGNIFICANT CHANGE UP (ref 0.5–1.3)
GLUCOSE SERPL-MCNC: 83 MG/DL — SIGNIFICANT CHANGE UP (ref 70–99)
HCT VFR BLD CALC: 33.5 % — LOW (ref 34.5–45)
HGB BLD-MCNC: 10.4 G/DL — LOW (ref 11.5–15.5)
MAGNESIUM SERPL-MCNC: 1.7 MG/DL — SIGNIFICANT CHANGE UP (ref 1.6–2.6)
MCHC RBC-ENTMCNC: 29.7 PG — SIGNIFICANT CHANGE UP (ref 27–34)
MCHC RBC-ENTMCNC: 31 % — LOW (ref 32–36)
MCV RBC AUTO: 95.7 FL — SIGNIFICANT CHANGE UP (ref 80–100)
NRBC # FLD: 0 — SIGNIFICANT CHANGE UP
PLATELET # BLD AUTO: 273 K/UL — SIGNIFICANT CHANGE UP (ref 150–400)
PMV BLD: 9.5 FL — SIGNIFICANT CHANGE UP (ref 7–13)
POTASSIUM SERPL-MCNC: 3.5 MMOL/L — SIGNIFICANT CHANGE UP (ref 3.5–5.3)
POTASSIUM SERPL-SCNC: 3.5 MMOL/L — SIGNIFICANT CHANGE UP (ref 3.5–5.3)
RBC # BLD: 3.5 M/UL — LOW (ref 3.8–5.2)
RBC # FLD: 15.3 % — HIGH (ref 10.3–14.5)
SODIUM SERPL-SCNC: 144 MMOL/L — SIGNIFICANT CHANGE UP (ref 135–145)
WBC # BLD: 5.94 K/UL — SIGNIFICANT CHANGE UP (ref 3.8–10.5)
WBC # FLD AUTO: 5.94 K/UL — SIGNIFICANT CHANGE UP (ref 3.8–10.5)

## 2017-12-03 RX ADMIN — Medication 12.5 MILLIGRAM(S): at 19:45

## 2017-12-03 RX ADMIN — Medication 1 DROP(S): at 17:23

## 2017-12-03 RX ADMIN — Medication 1 GRAM(S): at 13:31

## 2017-12-03 RX ADMIN — APIXABAN 5 MILLIGRAM(S): 2.5 TABLET, FILM COATED ORAL at 06:24

## 2017-12-03 RX ADMIN — PANTOPRAZOLE SODIUM 40 MILLIGRAM(S): 20 TABLET, DELAYED RELEASE ORAL at 17:26

## 2017-12-03 RX ADMIN — Medication 12.5 MILLIGRAM(S): at 06:24

## 2017-12-03 RX ADMIN — Medication 1 DROP(S): at 06:24

## 2017-12-03 RX ADMIN — PANTOPRAZOLE SODIUM 40 MILLIGRAM(S): 20 TABLET, DELAYED RELEASE ORAL at 06:24

## 2017-12-03 RX ADMIN — Medication 1 TABLET(S): at 09:07

## 2017-12-03 RX ADMIN — PIPERACILLIN AND TAZOBACTAM 25 GRAM(S): 4; .5 INJECTION, POWDER, LYOPHILIZED, FOR SOLUTION INTRAVENOUS at 06:25

## 2017-12-03 RX ADMIN — PIPERACILLIN AND TAZOBACTAM 25 GRAM(S): 4; .5 INJECTION, POWDER, LYOPHILIZED, FOR SOLUTION INTRAVENOUS at 23:08

## 2017-12-03 RX ADMIN — Medication 1 TABLET(S): at 13:31

## 2017-12-03 RX ADMIN — APIXABAN 5 MILLIGRAM(S): 2.5 TABLET, FILM COATED ORAL at 17:22

## 2017-12-03 RX ADMIN — Medication 1 GRAM(S): at 06:24

## 2017-12-03 RX ADMIN — Medication 1 GRAM(S): at 23:08

## 2017-12-03 RX ADMIN — Medication 1 TABLET(S): at 17:22

## 2017-12-03 RX ADMIN — PIPERACILLIN AND TAZOBACTAM 25 GRAM(S): 4; .5 INJECTION, POWDER, LYOPHILIZED, FOR SOLUTION INTRAVENOUS at 15:09

## 2017-12-03 RX ADMIN — Medication 1 GRAM(S): at 17:23

## 2017-12-03 NOTE — PROGRESS NOTE ADULT - PROBLEM SELECTOR PLAN 2
- acute right MCA CVA noted on CT scan confirmed on repeat CT head  - s/p tPA and MICU monitoring  - suspicion for cardioembolism (not confirmed) as patient increased risk (high [id number] scre)  - no significant stenosis on carotid ultrasound  - TTE negative for LV or atrial thrombus  - c/w statin   - on eliquis 5 mg bid, d/w daughter now concerned about high co pay

## 2017-12-03 NOTE — PROGRESS NOTE ADULT - ASSESSMENT
89 yo F prior hx CVA, PUD, prior GIB, OA/RA, p/w left sided weakness, s/p tPA, found to have a right MCA infarct in imaging. Course c/b fever (2/2 aspiration PNA vs UTI), (CXR concerning for possible PNA, Ucx growing >100K Klebsiella), clinically improving on Zosyn.

## 2017-12-03 NOTE — PROGRESS NOTE ADULT - PROBLEM SELECTOR PLAN 4
- on metoprolol for rate control as per EP (hold parameters for BP and HR)  - c/w eliquis for A/C  - appreciate GI recs, c/w protonix bid and carafate while on A/C  - continue to monitor CBC  to discuss with cm/sw about eliquis and copay

## 2017-12-03 NOTE — PROGRESS NOTE ADULT - PROBLEM SELECTOR PLAN 1
- new right MCA infarct s/p TPA in ED   - neurology workup appreciated  - TTE negative for LV or Atrial thrombus  - given the patients history of a life threatening gi bleed during her hospital stay in [place] and inability to undergo endoscopic evaluation d/t her cardiac comorbidities she would be at a moderate to high risk for rebleeding if anticoagulation were to be restarted  - occult negative (-) and has no overt/occult signs of GI bleeding   - Video Capsule EGD not performed as patient did not want to attempt swallowing due to size of the capsule  - discussed at length with daughter and patient with agreement and understanding that given negative occult and stable h/h role for endoscopic evaluation would not be beneficial and risks of undergoing anesthesia outweigh the benefits  - cont protonix bid while on a/c and carafate  - cont to trend cbc while on a/c

## 2017-12-03 NOTE — PROGRESS NOTE ADULT - PROBLEM SELECTOR PLAN 3
- pt has been off a/c since 12/2016 secondary to gib   - given the patients history of a life threatening gi bleed during her hospital stay in [place] and inability to undergo endoscopic evaluation d/t her cardiac comorbidities she would be at a moderate to high risk for rebleeding if anticoagulation were to be restarted  - discussed at length with daughter with agreement and understanding that given negative occult and stable h/h role for endoscopic evaluation would not be beneficial and risks of undergoing anesthesia outweigh the benefits  - cont protonix bid while on a/c and carafate bid  - cont to trend cbc while on a/c

## 2017-12-03 NOTE — PROGRESS NOTE ADULT - SUBJECTIVE AND OBJECTIVE BOX
INTERVAL HPI/OVERNIGHT EVENTS:    bm soft yesterday, brown, no melena or brbpr    MEDICATIONS  (STANDING):  apixaban 5 milliGRAM(s) Oral every 12 hours  artificial tears (preservative free) Ophthalmic Solution 1 Drop(s) Left EYE two times a day  lactobacillus acidophilus 1 Tablet(s) Oral three times a day with meals  metoprolol     tartrate 12.5 milliGRAM(s) Oral two times a day  pantoprazole    Tablet 40 milliGRAM(s) Oral two times a day before meals  piperacillin/tazobactam IVPB. 3.375 Gram(s) IV Intermittent every 8 hours  sucralfate 1 Gram(s) Oral four times a day    MEDICATIONS  (PRN):  acetaminophen   Tablet 650 milliGRAM(s) Oral every 6 hours PRN For Temp greater than 38 C (100.4 F)  acetaminophen   Tablet. 650 milliGRAM(s) Oral every 6 hours PRN Mild Pain (1 - 3)      Allergies    ACE inhibitors (Angioedema)  morphine (Unknown)    Intolerances    caffeine (Stomach Upset)  lactose (Other)      Review of Systems:    General:  No wt loss, fevers, chills, night sweats, fatigue   Eyes:  Good vision, no reported pain  ENT:  No sore throat, pain, runny nose, dysphagia  CV:  No pain, palpitations, hypo/hypertension  Resp:  No dyspnea, cough, tachypnea, wheezing  GI:  No pain, No nausea, No vomiting, No diarrhea, No constipation, No weight loss, No fever, No pruritis, No rectal bleeding, No melena, No dysphagia  :  No pain, bleeding, incontinence, nocturia  Muscle:  No pain, weakness  Neuro:  No weakness, tingling, memory problems  Psych:  No fatigue, insomnia, mood problems, depression  Endocrine:  No polyuria, polydypsia, cold/heat intolerance  Heme:  No petechiae, ecchymosis, easy bruisability  Skin:  No rash, tattoos, scars, edema      Vital Signs Last 24 Hrs  T(C): 37.1 (03 Dec 2017 06:30), Max: 37.4 (02 Dec 2017 15:30)  T(F): 98.7 (03 Dec 2017 06:30), Max: 99.4 (02 Dec 2017 15:30)  HR: 58 (03 Dec 2017 06:30) (55 - 80)  BP: 147/65 (03 Dec 2017 06:30) (125/55 - 149/62)  BP(mean): --  RR: 19 (03 Dec 2017 06:30) (17 - 19)  SpO2: 95% (03 Dec 2017 06:30) (95% - 98%)    PHYSICAL EXAM:    Constitutional: NAD  HEENT: EOMI, throat clear  Neck: No LAD, supple  Respiratory: CTA and P  Cardiovascular: S1 and S2, RRR, no M  Gastrointestinal: BS+, soft, NT/ND, neg HSM,  Extremities: No peripheral edema, neg clubbing, cyanosis  Vascular: 2+ peripheral pulses  Neurological: A/O x 2-3, no focal deficits  Psychiatric: Normal mood, normal affect  Skin: No rashes      LABS:                        10.4   5.94  )-----------( 273      ( 03 Dec 2017 07:09 )             33.5     12-03    144  |  107  |  11  ----------------------------<  83  3.5   |  23  |  0.91    Ca    8.8      03 Dec 2017 07:09  Mg     1.7     12-03            RADIOLOGY & ADDITIONAL TESTS:

## 2017-12-03 NOTE — PROGRESS NOTE ADULT - SUBJECTIVE AND OBJECTIVE BOX
Patient is a 89y old  Female who presents with a chief complaint of CVA (22 Nov 2017 09:17)      SUBJECTIVE / OVERNIGHT EVENTS: Pt was seen and examined at bedside at 105pm, no overnight events, pt's daughter at bedside, reports that she had enquired about eliquis and has to pay high copay and is reluctant about Eliquis now, no new complaints with patient except that she states that she just had PT and reports feeling tired. No other issues reported.    MEDICATIONS  (STANDING):  apixaban 5 milliGRAM(s) Oral every 12 hours  artificial tears (preservative free) Ophthalmic Solution 1 Drop(s) Left EYE two times a day  lactobacillus acidophilus 1 Tablet(s) Oral three times a day with meals  metoprolol     tartrate 12.5 milliGRAM(s) Oral two times a day  pantoprazole    Tablet 40 milliGRAM(s) Oral two times a day before meals  piperacillin/tazobactam IVPB. 3.375 Gram(s) IV Intermittent every 8 hours  sucralfate 1 Gram(s) Oral four times a day    MEDICATIONS  (PRN):  acetaminophen   Tablet 650 milliGRAM(s) Oral every 6 hours PRN For Temp greater than 38 C (100.4 F)  acetaminophen   Tablet. 650 milliGRAM(s) Oral every 6 hours PRN Mild Pain (1 - 3)      Vital Signs Last 24 Hrs  T(C): 36.7 (03 Dec 2017 14:43), Max: 37.4 (02 Dec 2017 15:30)  T(F): 98.1 (03 Dec 2017 14:43), Max: 99.4 (02 Dec 2017 15:30)  HR: 60 (03 Dec 2017 14:43) (55 - 80)  BP: 160/71 (03 Dec 2017 14:43) (125/55 - 160/71)  BP(mean): --  RR: 17 (03 Dec 2017 14:43) (17 - 19)  SpO2: 97% (03 Dec 2017 14:43) (95% - 98%)  CAPILLARY BLOOD GLUCOSE        I&O's Summary      PHYSICAL EXAM:  GENERAL: NAD, well-developed  EYES: Right gaze preference  CHEST/LUNG: Clear to auscultation bilaterally; No wheeze  HEART: Regular rate and rhythm  ABDOMEN: Soft, Nontender, Nondistended  EXTREMITIES:  b/l LE trace edema  PSYCH: calm  NEUROLOGY: alert, awake, responsive, rt gaze preference, and left facial droop  SKIN: No rashes or lesions    LABS:                        10.4   5.94  )-----------( 273      ( 03 Dec 2017 07:09 )             33.5     12-03    144  |  107  |  11  ----------------------------<  83  3.5   |  23  |  0.91    Ca    8.8      03 Dec 2017 07:09  Mg     1.7     12-03                RADIOLOGY & ADDITIONAL TESTS:    Imaging Personally Reviewed:    Consultant(s) Notes Reviewed:      Care Discussed with Consultants/Other Providers:

## 2017-12-04 LAB
BUN SERPL-MCNC: 11 MG/DL — SIGNIFICANT CHANGE UP (ref 7–23)
CALCIUM SERPL-MCNC: 8.5 MG/DL — SIGNIFICANT CHANGE UP (ref 8.4–10.5)
CHLORIDE SERPL-SCNC: 109 MMOL/L — HIGH (ref 98–107)
CO2 SERPL-SCNC: 24 MMOL/L — SIGNIFICANT CHANGE UP (ref 22–31)
CREAT SERPL-MCNC: 0.96 MG/DL — SIGNIFICANT CHANGE UP (ref 0.5–1.3)
GLUCOSE SERPL-MCNC: 77 MG/DL — SIGNIFICANT CHANGE UP (ref 70–99)
HCT VFR BLD CALC: 33 % — LOW (ref 34.5–45)
HGB BLD-MCNC: 10.2 G/DL — LOW (ref 11.5–15.5)
MAGNESIUM SERPL-MCNC: 1.6 MG/DL — SIGNIFICANT CHANGE UP (ref 1.6–2.6)
MCHC RBC-ENTMCNC: 29.3 PG — SIGNIFICANT CHANGE UP (ref 27–34)
MCHC RBC-ENTMCNC: 30.9 % — LOW (ref 32–36)
MCV RBC AUTO: 94.8 FL — SIGNIFICANT CHANGE UP (ref 80–100)
NRBC # FLD: 0 — SIGNIFICANT CHANGE UP
PLATELET # BLD AUTO: 269 K/UL — SIGNIFICANT CHANGE UP (ref 150–400)
PMV BLD: 9.5 FL — SIGNIFICANT CHANGE UP (ref 7–13)
POTASSIUM SERPL-MCNC: 3.2 MMOL/L — LOW (ref 3.5–5.3)
POTASSIUM SERPL-SCNC: 3.2 MMOL/L — LOW (ref 3.5–5.3)
RBC # BLD: 3.48 M/UL — LOW (ref 3.8–5.2)
RBC # FLD: 15.4 % — HIGH (ref 10.3–14.5)
SODIUM SERPL-SCNC: 145 MMOL/L — SIGNIFICANT CHANGE UP (ref 135–145)
WBC # BLD: 5.67 K/UL — SIGNIFICANT CHANGE UP (ref 3.8–10.5)
WBC # FLD AUTO: 5.67 K/UL — SIGNIFICANT CHANGE UP (ref 3.8–10.5)

## 2017-12-04 PROCEDURE — 99233 SBSQ HOSP IP/OBS HIGH 50: CPT

## 2017-12-04 RX ORDER — RIVAROXABAN 15 MG-20MG
1 KIT ORAL
Qty: 30 | Refills: 0 | OUTPATIENT
Start: 2017-12-04 | End: 2018-01-03

## 2017-12-04 RX ORDER — POTASSIUM CHLORIDE 20 MEQ
20 PACKET (EA) ORAL ONCE
Qty: 0 | Refills: 0 | Status: COMPLETED | OUTPATIENT
Start: 2017-12-04 | End: 2017-12-04

## 2017-12-04 RX ADMIN — Medication 1 GRAM(S): at 13:01

## 2017-12-04 RX ADMIN — Medication 1 GRAM(S): at 23:03

## 2017-12-04 RX ADMIN — PIPERACILLIN AND TAZOBACTAM 25 GRAM(S): 4; .5 INJECTION, POWDER, LYOPHILIZED, FOR SOLUTION INTRAVENOUS at 06:33

## 2017-12-04 RX ADMIN — Medication 1 TABLET(S): at 18:34

## 2017-12-04 RX ADMIN — PANTOPRAZOLE SODIUM 40 MILLIGRAM(S): 20 TABLET, DELAYED RELEASE ORAL at 18:34

## 2017-12-04 RX ADMIN — Medication 20 MILLIEQUIVALENT(S): at 13:01

## 2017-12-04 RX ADMIN — Medication 1 GRAM(S): at 18:34

## 2017-12-04 RX ADMIN — APIXABAN 5 MILLIGRAM(S): 2.5 TABLET, FILM COATED ORAL at 18:34

## 2017-12-04 RX ADMIN — PIPERACILLIN AND TAZOBACTAM 25 GRAM(S): 4; .5 INJECTION, POWDER, LYOPHILIZED, FOR SOLUTION INTRAVENOUS at 23:03

## 2017-12-04 RX ADMIN — PIPERACILLIN AND TAZOBACTAM 25 GRAM(S): 4; .5 INJECTION, POWDER, LYOPHILIZED, FOR SOLUTION INTRAVENOUS at 14:19

## 2017-12-04 RX ADMIN — Medication 1 DROP(S): at 18:41

## 2017-12-04 RX ADMIN — Medication 1 GRAM(S): at 06:32

## 2017-12-04 RX ADMIN — Medication 1 TABLET(S): at 09:36

## 2017-12-04 RX ADMIN — Medication 1 TABLET(S): at 13:01

## 2017-12-04 RX ADMIN — APIXABAN 5 MILLIGRAM(S): 2.5 TABLET, FILM COATED ORAL at 06:32

## 2017-12-04 RX ADMIN — PANTOPRAZOLE SODIUM 40 MILLIGRAM(S): 20 TABLET, DELAYED RELEASE ORAL at 06:33

## 2017-12-04 RX ADMIN — Medication 1 DROP(S): at 06:33

## 2017-12-04 NOTE — PROGRESS NOTE ADULT - PROBLEM SELECTOR PLAN 2
- acute right MCA CVA noted on CT scan confirmed on repeat CT head  - s/p tPA and MICU monitoring  - suspicion for cardioembolism (not confirmed) as patient increased risk (high [id number] scre)  - no significant stenosis on carotid ultrasound  - TTE negative for LV or atrial thrombus  - c/w statin   - on eliquis 5 mg bid, d/w daughter now concerned about high co pay case d/w CM/SW/Tele PA

## 2017-12-04 NOTE — PROGRESS NOTE ADULT - SUBJECTIVE AND OBJECTIVE BOX
Patient is a 89y old  Female who presents with a chief complaint of CVA (22 Nov 2017 09:17)      SUBJECTIVE / OVERNIGHT EVENTS: Daughter at bedside frustrated that eliquis cost so much after finally agreeing to AC. no bloody/black stool/no CP/SOB    MEDICATIONS  (STANDING):  apixaban 5 milliGRAM(s) Oral every 12 hours  artificial tears (preservative free) Ophthalmic Solution 1 Drop(s) Left EYE two times a day  lactobacillus acidophilus 1 Tablet(s) Oral three times a day with meals  metoprolol     tartrate 12.5 milliGRAM(s) Oral two times a day  pantoprazole    Tablet 40 milliGRAM(s) Oral two times a day before meals  piperacillin/tazobactam IVPB. 3.375 Gram(s) IV Intermittent every 8 hours  potassium chloride   Powder 20 milliEquivalent(s) Oral once  sucralfate 1 Gram(s) Oral four times a day    MEDICATIONS  (PRN):  acetaminophen   Tablet 650 milliGRAM(s) Oral every 6 hours PRN For Temp greater than 38 C (100.4 F)  acetaminophen   Tablet. 650 milliGRAM(s) Oral every 6 hours PRN Mild Pain (1 - 3)        CAPILLARY BLOOD GLUCOSE        I&O's Summary      T(C): 36.9 (12-04-17 @ 06:30), Max: 36.9 (12-04-17 @ 06:30)  HR: 51 (12-04-17 @ 06:30) (51 - 61)  BP: 137/58 (12-04-17 @ 06:30) (137/58 - 160/71)  RR: 18 (12-04-17 @ 06:30) (17 - 18)  SpO2: 97% (12-04-17 @ 06:30) (97% - 97%)    PHYSICAL EXAM:  GENERAL: NAD, well-developed  HEAD:  Atraumatic, Normocephalic  EYES: Rt gaze prefence  NECK: Supple, No JVD  CHEST/LUNG: Clear to auscultation bilaterally; No wheeze  HEART: Regular rate and rhythm; No murmurs, rubs, or gallops  ABDOMEN: Soft, Nontender, Nondistended; Bowel sounds present  EXTREMITIES:  2+ Peripheral Pulses, No clubbing, cyanosis, or edema  PSYCH: AAOx2  NEUROLOGY: Lt hemiparesis 2/5 mild dsyarthria      LABS:                        10.2   5.67  )-----------( 269      ( 04 Dec 2017 06:40 )             33.0     12-04    145  |  109<H>  |  11  ----------------------------<  77  3.2<L>   |  24  |  0.96    Ca    8.5      04 Dec 2017 06:40  Mg     1.6     12-04                  RADIOLOGY & ADDITIONAL TESTS:    Imaging Personally Reviewed:    Consultant(s) Notes Reviewed:      Care Discussed with Consultants/Other Providers:

## 2017-12-04 NOTE — PROGRESS NOTE ADULT - PROBLEM SELECTOR PLAN 1
- new right MCA infarct s/p TPA in ED   - neurology workup appreciated  - TTE negative for LV or Atrial thrombus  - given the patients history of a life threatening gi bleed during her hospital stay in [place] and inability to undergo endoscopic evaluation d/t her cardiac comorbidities she would be at a moderate to high risk for rebleeding if anticoagulation were to be restarted  - occult negative (-) and has no overt/occult signs of GI bleeding   - Video Capsule EGD not performed as patient did not want to attempt swallowing due to size of the capsule  - discussed at length with daughter and patient with agreement and understanding that given negative occult and stable h/h role for endoscopic evaluation would not be beneficial and risks of undergoing anesthesia outweigh the benefits  - cont protonix bid while on a/c and carafate  - cont to trend cbc while on a/c  at present remains stable

## 2017-12-04 NOTE — PROGRESS NOTE ADULT - SUBJECTIVE AND OBJECTIVE BOX
INTERVAL HPI/OVERNIGHT EVENTS: Pt seen and examined at bedside, no new complaints, no BRBPR    MEDICATIONS  (STANDING):  apixaban 5 milliGRAM(s) Oral every 12 hours  artificial tears (preservative free) Ophthalmic Solution 1 Drop(s) Left EYE two times a day  lactobacillus acidophilus 1 Tablet(s) Oral three times a day with meals  metoprolol     tartrate 12.5 milliGRAM(s) Oral two times a day  pantoprazole    Tablet 40 milliGRAM(s) Oral two times a day before meals  piperacillin/tazobactam IVPB. 3.375 Gram(s) IV Intermittent every 8 hours  sucralfate 1 Gram(s) Oral four times a day    MEDICATIONS  (PRN):  acetaminophen   Tablet 650 milliGRAM(s) Oral every 6 hours PRN For Temp greater than 38 C (100.4 F)  acetaminophen   Tablet. 650 milliGRAM(s) Oral every 6 hours PRN Mild Pain (1 - 3)      Allergies    ACE inhibitors (Angioedema)  morphine (Unknown)    Intolerances    caffeine (Stomach Upset)  lactose (Other)      ROS:   General:  No wt loss, fevers, chills, night sweats, fatigue,   Eyes:  Good vision, no reported pain  ENT:  No sore throat, pain, runny nose, dysphagia  CV:  No pain, palpitations, hypo/hypertension  Resp:  No dyspnea, cough, tachypnea, wheezing  GI:  No pain, No nausea, No vomiting, No diarrhea, No constipation, No weight loss, No fever, No pruritis, No rectal bleeding, No tarry stools, No dysphagia,  :  No pain, bleeding, incontinence, nocturia  Muscle:  No pain, weakness  Neuro:  No weakness, tingling, memory problems  Psych:  No fatigue, insomnia, mood problems, depression  Endocrine:  No polyuria, polydipsia, cold/heat intolerance  Heme:  No petechiae, ecchymosis, easy bruisability  Skin:  No rash, tattoos, scars, edema      PHYSICAL EXAM:   Vital Signs:  Vital Signs Last 24 Hrs  T(C): 36.9 (04 Dec 2017 06:30), Max: 36.9 (04 Dec 2017 06:30)  T(F): 98.4 (04 Dec 2017 06:30), Max: 98.4 (04 Dec 2017 06:30)  HR: 51 (04 Dec 2017 06:30) (51 - 61)  BP: 137/58 (04 Dec 2017 06:30) (137/58 - 155/76)  BP(mean): --  RR: 18 (04 Dec 2017 06:30) (18 - 18)  SpO2: 97% (04 Dec 2017 06:30) (97% - 97%)  Daily     Daily     GENERAL:  Appears stated age, well-groomed, well-nourished, no distress  HEENT:  NC/AT,  conjunctivae clear and pink, no thyromegaly, nodules, adenopathy, no JVD, sclera -anicteric  CHEST:  Full & symmetric excursion, no increased effort, breath sounds clear  HEART:  Regular rhythm, S1, S2, no murmur/rub/S3/S4, no abdominal bruit, no edema  ABDOMEN:  Soft, non-tender, non-distended, normoactive bowel sounds,  no masses ,no hepato-splenomegaly, no signs of chronic liver disease  EXTEREMITIES:  no cyanosis,clubbing or edema  SKIN:  No rash/erythema/ecchymoses/petechiae/wounds/abscess/warm/dry  NEURO:  Alert, oriented, no asterixis, no tremor, no encephalopathy      LABS:                        10.2   5.67  )-----------( 269      ( 04 Dec 2017 06:40 )             33.0     12-04    145  |  109<H>  |  11  ----------------------------<  77  3.2<L>   |  24  |  0.96    Ca    8.5      04 Dec 2017 06:40  Mg     1.6     12-04            RADIOLOGY & ADDITIONAL TESTS:

## 2017-12-04 NOTE — PROGRESS NOTE ADULT - PROBLEM SELECTOR PLAN 1
PNA (concern for gram negative PNA due to aspiration) /UTI (K.PNA)  - currently afebrile, without leukocytosis   - plan to complete Zosyn 7/7 day course of antibiotics

## 2017-12-04 NOTE — PROGRESS NOTE ADULT - PROBLEM SELECTOR PLAN 3
- pt has been off a/c since 12/2016 secondary to gib   - given the patients history of a life threatening gi bleed during her hospital stay in [place] and inability to undergo endoscopic evaluation d/t her cardiac comorbidities she would be at a moderate to high risk for rebleeding if anticoagulation were to be restarted  - discussed at length with daughter with agreement and understanding that given negative occult and stable h/h role for endoscopic evaluation would not be beneficial and risks of undergoing anesthesia outweigh the benefits  - cont protonix bid while on a/c and carafate bid  - cont to trend cbc while on a/c

## 2017-12-04 NOTE — PROGRESS NOTE ADULT - PROBLEM SELECTOR PLAN 4
- on metoprolol for rate control as per EP (hold parameters for BP and HR)  - c/w eliquis for A/C for now  - appreciate GI recs, c/w protonix bid and carafate while on A/C  - continue to monitor CBC  to discuss with cm/sw about eliquis and copay and will f/u with EP in regards with other alternatives and cost if cost still too high for family will consider coumadin.

## 2017-12-04 NOTE — CHART NOTE - NSCHARTNOTEFT_GEN_A_CORE
In regards to AC. NOACs are all covered but with $360 copay for 30 day supply.    d/w EP--> recc Coumadin if NOAC not a feasible choice.     d/w Daughter-->she is concerned about multiple issues surrounding coumadin most importantly the weekly blood draws since pt is a very hard stick/ hard to transport pt back and forth to doctors office.   As per CM home care for blood draws are not an option for this pt due to her current insurance.  They will try to contact [facility] in am to explore option of home visits for blood draw.   Daughter currently undecided on AC. Will continue Eliquis for now. Will readdress with team in am and explore any other options.     d/w Dr. lambert

## 2017-12-05 LAB
BUN SERPL-MCNC: 10 MG/DL — SIGNIFICANT CHANGE UP (ref 7–23)
CALCIUM SERPL-MCNC: 8.2 MG/DL — LOW (ref 8.4–10.5)
CHLORIDE SERPL-SCNC: 110 MMOL/L — HIGH (ref 98–107)
CO2 SERPL-SCNC: 24 MMOL/L — SIGNIFICANT CHANGE UP (ref 22–31)
CREAT SERPL-MCNC: 0.95 MG/DL — SIGNIFICANT CHANGE UP (ref 0.5–1.3)
GLUCOSE SERPL-MCNC: 86 MG/DL — SIGNIFICANT CHANGE UP (ref 70–99)
HCT VFR BLD CALC: 30.5 % — LOW (ref 34.5–45)
HGB BLD-MCNC: 9.5 G/DL — LOW (ref 11.5–15.5)
INR BLD: 1.71 — HIGH (ref 0.88–1.17)
MAGNESIUM SERPL-MCNC: 1.6 MG/DL — SIGNIFICANT CHANGE UP (ref 1.6–2.6)
MCHC RBC-ENTMCNC: 29.7 PG — SIGNIFICANT CHANGE UP (ref 27–34)
MCHC RBC-ENTMCNC: 31.1 % — LOW (ref 32–36)
MCV RBC AUTO: 95.3 FL — SIGNIFICANT CHANGE UP (ref 80–100)
NRBC # FLD: 0 — SIGNIFICANT CHANGE UP
PHOSPHATE SERPL-MCNC: 2 MG/DL — LOW (ref 2.5–4.5)
PLATELET # BLD AUTO: 259 K/UL — SIGNIFICANT CHANGE UP (ref 150–400)
PMV BLD: 9.1 FL — SIGNIFICANT CHANGE UP (ref 7–13)
POTASSIUM SERPL-MCNC: 3.2 MMOL/L — LOW (ref 3.5–5.3)
POTASSIUM SERPL-SCNC: 3.2 MMOL/L — LOW (ref 3.5–5.3)
PROTHROM AB SERPL-ACNC: 19.4 SEC — HIGH (ref 9.8–13.1)
RBC # BLD: 3.2 M/UL — LOW (ref 3.8–5.2)
RBC # FLD: 15.5 % — HIGH (ref 10.3–14.5)
SODIUM SERPL-SCNC: 146 MMOL/L — HIGH (ref 135–145)
WBC # BLD: 5.99 K/UL — SIGNIFICANT CHANGE UP (ref 3.8–10.5)
WBC # FLD AUTO: 5.99 K/UL — SIGNIFICANT CHANGE UP (ref 3.8–10.5)

## 2017-12-05 PROCEDURE — 99233 SBSQ HOSP IP/OBS HIGH 50: CPT

## 2017-12-05 RX ORDER — SODIUM CHLORIDE 9 MG/ML
1000 INJECTION, SOLUTION INTRAVENOUS
Qty: 0 | Refills: 0 | Status: DISCONTINUED | OUTPATIENT
Start: 2017-12-05 | End: 2017-12-09

## 2017-12-05 RX ORDER — WARFARIN SODIUM 2.5 MG/1
2 TABLET ORAL ONCE
Qty: 0 | Refills: 0 | Status: COMPLETED | OUTPATIENT
Start: 2017-12-05 | End: 2017-12-05

## 2017-12-05 RX ORDER — WARFARIN SODIUM 2.5 MG/1
2 TABLET ORAL ONCE
Qty: 0 | Refills: 0 | Status: DISCONTINUED | OUTPATIENT
Start: 2017-12-05 | End: 2017-12-05

## 2017-12-05 RX ORDER — METOPROLOL TARTRATE 50 MG
12.5 TABLET ORAL
Qty: 0 | Refills: 0 | Status: DISCONTINUED | OUTPATIENT
Start: 2017-12-05 | End: 2017-12-12

## 2017-12-05 RX ORDER — POTASSIUM CHLORIDE 20 MEQ
40 PACKET (EA) ORAL ONCE
Qty: 0 | Refills: 0 | Status: COMPLETED | OUTPATIENT
Start: 2017-12-05 | End: 2017-12-05

## 2017-12-05 RX ORDER — ENOXAPARIN SODIUM 100 MG/ML
70 INJECTION SUBCUTANEOUS
Qty: 0 | Refills: 0 | Status: DISCONTINUED | OUTPATIENT
Start: 2017-12-05 | End: 2017-12-11

## 2017-12-05 RX ADMIN — Medication 12.5 MILLIGRAM(S): at 17:19

## 2017-12-05 RX ADMIN — Medication 1 TABLET(S): at 09:22

## 2017-12-05 RX ADMIN — PIPERACILLIN AND TAZOBACTAM 25 GRAM(S): 4; .5 INJECTION, POWDER, LYOPHILIZED, FOR SOLUTION INTRAVENOUS at 21:45

## 2017-12-05 RX ADMIN — SODIUM CHLORIDE 60 MILLILITER(S): 9 INJECTION, SOLUTION INTRAVENOUS at 18:50

## 2017-12-05 RX ADMIN — PIPERACILLIN AND TAZOBACTAM 25 GRAM(S): 4; .5 INJECTION, POWDER, LYOPHILIZED, FOR SOLUTION INTRAVENOUS at 06:00

## 2017-12-05 RX ADMIN — Medication 1 TABLET(S): at 13:37

## 2017-12-05 RX ADMIN — PANTOPRAZOLE SODIUM 40 MILLIGRAM(S): 20 TABLET, DELAYED RELEASE ORAL at 17:19

## 2017-12-05 RX ADMIN — Medication 1 DROP(S): at 21:45

## 2017-12-05 RX ADMIN — Medication 40 MILLIEQUIVALENT(S): at 23:24

## 2017-12-05 RX ADMIN — WARFARIN SODIUM 2 MILLIGRAM(S): 2.5 TABLET ORAL at 17:18

## 2017-12-05 RX ADMIN — Medication 1 DROP(S): at 06:01

## 2017-12-05 RX ADMIN — Medication 1 GRAM(S): at 11:16

## 2017-12-05 RX ADMIN — Medication 650 MILLIGRAM(S): at 06:00

## 2017-12-05 RX ADMIN — PANTOPRAZOLE SODIUM 40 MILLIGRAM(S): 20 TABLET, DELAYED RELEASE ORAL at 06:01

## 2017-12-05 RX ADMIN — Medication 1 GRAM(S): at 23:24

## 2017-12-05 RX ADMIN — ENOXAPARIN SODIUM 70 MILLIGRAM(S): 100 INJECTION SUBCUTANEOUS at 06:00

## 2017-12-05 RX ADMIN — Medication 650 MILLIGRAM(S): at 06:45

## 2017-12-05 RX ADMIN — Medication 1 GRAM(S): at 06:01

## 2017-12-05 RX ADMIN — ENOXAPARIN SODIUM 70 MILLIGRAM(S): 100 INJECTION SUBCUTANEOUS at 17:19

## 2017-12-05 RX ADMIN — PIPERACILLIN AND TAZOBACTAM 25 GRAM(S): 4; .5 INJECTION, POWDER, LYOPHILIZED, FOR SOLUTION INTRAVENOUS at 13:37

## 2017-12-05 RX ADMIN — Medication 1 GRAM(S): at 17:18

## 2017-12-05 RX ADMIN — Medication 1 TABLET(S): at 17:19

## 2017-12-05 NOTE — PROGRESS NOTE ADULT - PROBLEM SELECTOR PLAN 4
- on metoprolol for rate control as per EP (hold parameters for BP and HR)  - c/w eliquis for A/C for now  - appreciate GI recs, c/w protonix bid and carafate while on A/C  - continue to monitor CBC  -start coumadin with bridge monitor INR -1.7 dose coumadin 2 mg

## 2017-12-05 NOTE — PROGRESS NOTE ADULT - SUBJECTIVE AND OBJECTIVE BOX
Patient is a 89y old  Female who presents with a chief complaint of CVA (22 Nov 2017 09:17)      SUBJECTIVE / OVERNIGHT EVENTS: Pt in NAD     MEDICATIONS  (STANDING):  artificial tears (preservative free) Ophthalmic Solution 1 Drop(s) Left EYE two times a day  enoxaparin Injectable 70 milliGRAM(s) SubCutaneous two times a day  lactobacillus acidophilus 1 Tablet(s) Oral three times a day with meals  metoprolol     tartrate 12.5 milliGRAM(s) Oral two times a day  pantoprazole    Tablet 40 milliGRAM(s) Oral two times a day before meals  piperacillin/tazobactam IVPB. 3.375 Gram(s) IV Intermittent every 8 hours  potassium chloride   Powder 40 milliEquivalent(s) Oral once  sucralfate 1 Gram(s) Oral four times a day    MEDICATIONS  (PRN):  acetaminophen   Tablet 650 milliGRAM(s) Oral every 6 hours PRN For Temp greater than 38 C (100.4 F)  acetaminophen   Tablet. 650 milliGRAM(s) Oral every 6 hours PRN Mild Pain (1 - 3)        CAPILLARY BLOOD GLUCOSE        I&O's Summary      T(C): 37.2 (12-05-17 @ 06:00), Max: 37.7 (12-04-17 @ 21:30)  HR: 59 (12-05-17 @ 06:00) (59 - 65)  BP: 154/64 (12-05-17 @ 06:00) (140/52 - 157/70)  RR: 16 (12-05-17 @ 06:00) (16 - 18)  SpO2: 98% (12-05-17 @ 06:00) (95% - 98%)    PHYSICAL EXAM:      LABS:                        9.5    5.99  )-----------( 259      ( 05 Dec 2017 07:21 )             30.5     12-05    146<H>  |  110<H>  |  10  ----------------------------<  86  3.2<L>   |  24  |  0.95    Ca    8.2<L>      05 Dec 2017 07:21  Phos  2.0     12-05  Mg     1.6     12-05      PT/INR - ( 05 Dec 2017 07:21 )   PT: 19.4 SEC;   INR: 1.71                      RADIOLOGY & ADDITIONAL TESTS:    Imaging Personally Reviewed:    Consultant(s) Notes Reviewed:      Care Discussed with Consultants/Other Providers: Patient is a 89y old  Female who presents with a chief complaint of CVA (22 Nov 2017 09:17)      SUBJECTIVE / OVERNIGHT EVENTS: Pt in NAD . Tele rates controlled     MEDICATIONS  (STANDING):  artificial tears (preservative free) Ophthalmic Solution 1 Drop(s) Left EYE two times a day  enoxaparin Injectable 70 milliGRAM(s) SubCutaneous two times a day  lactobacillus acidophilus 1 Tablet(s) Oral three times a day with meals  metoprolol     tartrate 12.5 milliGRAM(s) Oral two times a day  pantoprazole    Tablet 40 milliGRAM(s) Oral two times a day before meals  piperacillin/tazobactam IVPB. 3.375 Gram(s) IV Intermittent every 8 hours  potassium chloride   Powder 40 milliEquivalent(s) Oral once  sucralfate 1 Gram(s) Oral four times a day    MEDICATIONS  (PRN):  acetaminophen   Tablet 650 milliGRAM(s) Oral every 6 hours PRN For Temp greater than 38 C (100.4 F)  acetaminophen   Tablet. 650 milliGRAM(s) Oral every 6 hours PRN Mild Pain (1 - 3)        CAPILLARY BLOOD GLUCOSE        I&O's Summary      T(C): 37.2 (12-05-17 @ 06:00), Max: 37.7 (12-04-17 @ 21:30)  HR: 59 (12-05-17 @ 06:00) (59 - 65)  BP: 154/64 (12-05-17 @ 06:00) (140/52 - 157/70)  RR: 16 (12-05-17 @ 06:00) (16 - 18)  SpO2: 98% (12-05-17 @ 06:00) (95% - 98%)    PHYSICAL EXAM:      LABS:                        9.5    5.99  )-----------( 259      ( 05 Dec 2017 07:21 )             30.5     12-05    146<H>  |  110<H>  |  10  ----------------------------<  86  3.2<L>   |  24  |  0.95    Ca    8.2<L>      05 Dec 2017 07:21  Phos  2.0     12-05  Mg     1.6     12-05      PT/INR - ( 05 Dec 2017 07:21 )   PT: 19.4 SEC;   INR: 1.71                      RADIOLOGY & ADDITIONAL TESTS:    Imaging Personally Reviewed:    Consultant(s) Notes Reviewed:      Care Discussed with Consultants/Other Providers: Patient is a 89y old  Female who presents with a chief complaint of CVA (22 Nov 2017 09:17)      SUBJECTIVE / OVERNIGHT EVENTS: Pt in NAD . Tele rates controlled     MEDICATIONS  (STANDING):  artificial tears (preservative free) Ophthalmic Solution 1 Drop(s) Left EYE two times a day  enoxaparin Injectable 70 milliGRAM(s) SubCutaneous two times a day  lactobacillus acidophilus 1 Tablet(s) Oral three times a day with meals  metoprolol     tartrate 12.5 milliGRAM(s) Oral two times a day  pantoprazole    Tablet 40 milliGRAM(s) Oral two times a day before meals  piperacillin/tazobactam IVPB. 3.375 Gram(s) IV Intermittent every 8 hours  potassium chloride   Powder 40 milliEquivalent(s) Oral once  sucralfate 1 Gram(s) Oral four times a day    MEDICATIONS  (PRN):  acetaminophen   Tablet 650 milliGRAM(s) Oral every 6 hours PRN For Temp greater than 38 C (100.4 F)  acetaminophen   Tablet. 650 milliGRAM(s) Oral every 6 hours PRN Mild Pain (1 - 3)        CAPILLARY BLOOD GLUCOSE        I&O's Summary      T(C): 37.2 (12-05-17 @ 06:00), Max: 37.7 (12-04-17 @ 21:30)  HR: 59 (12-05-17 @ 06:00) (59 - 65)  BP: 154/64 (12-05-17 @ 06:00) (140/52 - 157/70)  RR: 16 (12-05-17 @ 06:00) (16 - 18)  SpO2: 98% (12-05-17 @ 06:00) (95% - 98%)    PHYSICAL EXAM:  GENERAL: NAD, well-developed  HEAD:  Atraumatic, Normocephalic  EYES: Rt gaze prefence  NECK: Supple, No JVD  CHEST/LUNG: Clear to auscultation bilaterally; No wheeze  HEART: Regular rate and rhythm; No murmurs, rubs, or gallops  ABDOMEN: Soft, Nontender, Nondistended; Bowel sounds present  EXTREMITIES:  2+ Peripheral Pulses, No clubbing, cyanosis, or edema  PSYCH: AAOx3  NEUROLOGY: Lt hemiparesis 2/5 mild dsyarthria    LABS:                        9.5    5.99  )-----------( 259      ( 05 Dec 2017 07:21 )             30.5     12-05    146<H>  |  110<H>  |  10  ----------------------------<  86  3.2<L>   |  24  |  0.95    Ca    8.2<L>      05 Dec 2017 07:21  Phos  2.0     12-05  Mg     1.6     12-05      PT/INR - ( 05 Dec 2017 07:21 )   PT: 19.4 SEC;   INR: 1.71                      RADIOLOGY & ADDITIONAL TESTS:    Imaging Personally Reviewed:    Consultant(s) Notes Reviewed:      Care Discussed with Consultants/Other Providers:

## 2017-12-05 NOTE — CHART NOTE - NSCHARTNOTEFT_GEN_A_CORE
Coumadin and Lovenox risks vs benefits discussed with patient's daughters Karma and Tessy.  They have agreed to proceed with Lovenox to Coumadin bridge and wishes for her to be therapeutic prior to discharge.    Lovenox Ordered, Will FU PT/INR in AM and dose Coumadin accordingly.      Will continue to monitor.

## 2017-12-05 NOTE — PROGRESS NOTE ADULT - PROBLEM SELECTOR PLAN 3
- pt has been off a/c since 12/2016 secondary to gib   - given the patients history of a life threatening gi bleed during her hospital stay in [place] and inability to undergo endoscopic evaluation d/t her cardiac comorbidities she would be at a moderate to high risk for rebleeding if anticoagulation were to be restarted  - discussed at length with daughter with agreement and understanding that given negative occult and stable h/h role for endoscopic evaluation would not be beneficial and risks of undergoing anesthesia outweigh the benefits  - cont protonix bid while on a/c and carafate bid  - cont to trend cbc while on a/c

## 2017-12-05 NOTE — PROGRESS NOTE ADULT - PROBLEM SELECTOR PLAN 1
PNA (concern for gram negative PNA due to aspiration) /UTI (K.PNA)  - currently afebrile, without leukocytosis   - plan to complete Zosyn 7/7 day course of antibiotics discontinue zosyn today

## 2017-12-05 NOTE — PROGRESS NOTE ADULT - SUBJECTIVE AND OBJECTIVE BOX
INTERVAL HPI/OVERNIGHT EVENTS: Pt seen and examined at bedside, no new changes    MEDICATIONS  (STANDING):  artificial tears (preservative free) Ophthalmic Solution 1 Drop(s) Left EYE two times a day  enoxaparin Injectable 70 milliGRAM(s) SubCutaneous two times a day  lactobacillus acidophilus 1 Tablet(s) Oral three times a day with meals  metoprolol     tartrate 12.5 milliGRAM(s) Oral two times a day  pantoprazole    Tablet 40 milliGRAM(s) Oral two times a day before meals  piperacillin/tazobactam IVPB. 3.375 Gram(s) IV Intermittent every 8 hours  potassium chloride   Powder 40 milliEquivalent(s) Oral once  sucralfate 1 Gram(s) Oral four times a day    MEDICATIONS  (PRN):  acetaminophen   Tablet 650 milliGRAM(s) Oral every 6 hours PRN For Temp greater than 38 C (100.4 F)  acetaminophen   Tablet. 650 milliGRAM(s) Oral every 6 hours PRN Mild Pain (1 - 3)      Allergies    ACE inhibitors (Angioedema)  morphine (Unknown)    Intolerances    caffeine (Stomach Upset)  lactose (Other)      ROS:   General:  No wt loss, fevers, chills, night sweats, fatigue,   Eyes:  Good vision, no reported pain  ENT:  No sore throat, pain, runny nose, dysphagia  CV:  No pain, palpitations, hypo/hypertension  Resp:  No dyspnea, cough, tachypnea, wheezing  GI:  No pain, No nausea, No vomiting, No diarrhea, No constipation, No weight loss, No fever, No pruritis, No rectal bleeding, No tarry stools, No dysphagia,  :  No pain, bleeding, incontinence, nocturia  Muscle:  No pain, weakness  Neuro:  No weakness, tingling, memory problems  Psych:  No fatigue, insomnia, mood problems, depression  Endocrine:  No polyuria, polydipsia, cold/heat intolerance  Heme:  No petechiae, ecchymosis, easy bruisability  Skin:  No rash, tattoos, scars, edema      PHYSICAL EXAM:   Vital Signs:  Vital Signs Last 24 Hrs  T(C): 37.2 (05 Dec 2017 06:00), Max: 37.7 (04 Dec 2017 21:30)  T(F): 98.9 (05 Dec 2017 06:00), Max: 99.8 (04 Dec 2017 21:30)  HR: 59 (05 Dec 2017 06:00) (59 - 65)  BP: 154/64 (05 Dec 2017 06:00) (140/52 - 157/70)  BP(mean): --  RR: 16 (05 Dec 2017 06:00) (16 - 18)  SpO2: 98% (05 Dec 2017 06:00) (95% - 98%)  Daily     Daily     GENERAL:  Appears stated age, well-groomed, well-nourished, no distress  HEENT:  NC/AT,  conjunctivae clear and pink, no thyromegaly, nodules, adenopathy, no JVD, sclera -anicteric  CHEST:  Full & symmetric excursion, no increased effort, breath sounds clear  HEART:  Regular rhythm, S1, S2, no murmur/rub/S3/S4, no abdominal bruit, no edema  ABDOMEN:  Soft, non-tender, non-distended, normoactive bowel sounds,  no masses ,no hepato-splenomegaly, no signs of chronic liver disease  EXTEREMITIES:  no cyanosis,clubbing or edema  SKIN:  No rash/erythema/ecchymoses/petechiae/wounds/abscess/warm/dry  NEURO:  Alert, oriented, no asterixis, no tremor, no encephalopathy      LABS:                        9.5    5.99  )-----------( 259      ( 05 Dec 2017 07:21 )             30.5     12-05    146<H>  |  110<H>  |  10  ----------------------------<  86  3.2<L>   |  24  |  0.95    Ca    8.2<L>      05 Dec 2017 07:21  Phos  2.0     12-05  Mg     1.6     12-05      PT/INR - ( 05 Dec 2017 07:21 )   PT: 19.4 SEC;   INR: 1.71                RADIOLOGY & ADDITIONAL TESTS:

## 2017-12-05 NOTE — PROGRESS NOTE ADULT - PROBLEM SELECTOR PLAN 1
- new right MCA infarct s/p TPA in ED   - neurology workup appreciated  - TTE negative for LV or Atrial thrombus  - given the patients history of a life threatening gi bleed during her hospital stay in [place] and inability to undergo endoscopic evaluation d/t her cardiac comorbidities she would be at a moderate to high risk for rebleeding if anticoagulation were to be restarted  - occult negative (-) and has no overt/occult signs of GI bleeding   - Video Capsule EGD not performed as patient did not want to attempt swallowing due to size of the capsule  - discussed at length with daughter and patient with agreement and understanding that given negative occult and stable h/h role for endoscopic evaluation would not be beneficial and risks of undergoing anesthesia outweigh the benefits  - cont protonix bid while on a/c and carafate  - cont to trend cbc while on a/c  at present remains stable

## 2017-12-05 NOTE — PROGRESS NOTE ADULT - PROBLEM SELECTOR PLAN 2
- acute right MCA CVA noted on CT scan confirmed on repeat CT head  - s/p tPA and MICU monitoring  - suspicion for cardioembolism (not confirmed) as patient increased risk (high [id number] scre)  - no significant stenosis on carotid ultrasound  - TTE negative for LV or atrial thrombus  - c/w statin   -co-Pay too high for DOAC as per family started on lovenox bridge to coumadin

## 2017-12-06 LAB
BUN SERPL-MCNC: 9 MG/DL — SIGNIFICANT CHANGE UP (ref 7–23)
CALCIUM SERPL-MCNC: 8.4 MG/DL — SIGNIFICANT CHANGE UP (ref 8.4–10.5)
CHLORIDE SERPL-SCNC: 108 MMOL/L — HIGH (ref 98–107)
CO2 SERPL-SCNC: 24 MMOL/L — SIGNIFICANT CHANGE UP (ref 22–31)
CREAT SERPL-MCNC: 0.89 MG/DL — SIGNIFICANT CHANGE UP (ref 0.5–1.3)
GLUCOSE SERPL-MCNC: 75 MG/DL — SIGNIFICANT CHANGE UP (ref 70–99)
HCT VFR BLD CALC: 31.1 % — LOW (ref 34.5–45)
HGB BLD-MCNC: 9.9 G/DL — LOW (ref 11.5–15.5)
INR BLD: 1.49 — HIGH (ref 0.88–1.17)
MCHC RBC-ENTMCNC: 30.7 PG — SIGNIFICANT CHANGE UP (ref 27–34)
MCHC RBC-ENTMCNC: 31.8 % — LOW (ref 32–36)
MCV RBC AUTO: 96.6 FL — SIGNIFICANT CHANGE UP (ref 80–100)
NRBC # FLD: 0 — SIGNIFICANT CHANGE UP
PLATELET # BLD AUTO: 257 K/UL — SIGNIFICANT CHANGE UP (ref 150–400)
PMV BLD: 9.3 FL — SIGNIFICANT CHANGE UP (ref 7–13)
POTASSIUM SERPL-MCNC: 4.1 MMOL/L — SIGNIFICANT CHANGE UP (ref 3.5–5.3)
POTASSIUM SERPL-SCNC: 4.1 MMOL/L — SIGNIFICANT CHANGE UP (ref 3.5–5.3)
PROTHROM AB SERPL-ACNC: 16.8 SEC — HIGH (ref 9.8–13.1)
RBC # BLD: 3.22 M/UL — LOW (ref 3.8–5.2)
RBC # FLD: 15.3 % — HIGH (ref 10.3–14.5)
SODIUM SERPL-SCNC: 143 MMOL/L — SIGNIFICANT CHANGE UP (ref 135–145)
WBC # BLD: 5.48 K/UL — SIGNIFICANT CHANGE UP (ref 3.8–10.5)
WBC # FLD AUTO: 5.48 K/UL — SIGNIFICANT CHANGE UP (ref 3.8–10.5)

## 2017-12-06 PROCEDURE — 93010 ELECTROCARDIOGRAM REPORT: CPT

## 2017-12-06 PROCEDURE — 99233 SBSQ HOSP IP/OBS HIGH 50: CPT

## 2017-12-06 RX ORDER — WARFARIN SODIUM 2.5 MG/1
5 TABLET ORAL ONCE
Qty: 0 | Refills: 0 | Status: DISCONTINUED | OUTPATIENT
Start: 2017-12-06 | End: 2017-12-06

## 2017-12-06 RX ORDER — WARFARIN SODIUM 2.5 MG/1
4 TABLET ORAL ONCE
Qty: 0 | Refills: 0 | Status: COMPLETED | OUTPATIENT
Start: 2017-12-06 | End: 2017-12-06

## 2017-12-06 RX ADMIN — ENOXAPARIN SODIUM 70 MILLIGRAM(S): 100 INJECTION SUBCUTANEOUS at 17:03

## 2017-12-06 RX ADMIN — Medication 1 GRAM(S): at 23:13

## 2017-12-06 RX ADMIN — Medication 1 DROP(S): at 17:03

## 2017-12-06 RX ADMIN — PANTOPRAZOLE SODIUM 40 MILLIGRAM(S): 20 TABLET, DELAYED RELEASE ORAL at 17:04

## 2017-12-06 RX ADMIN — WARFARIN SODIUM 4 MILLIGRAM(S): 2.5 TABLET ORAL at 17:03

## 2017-12-06 RX ADMIN — Medication 1 TABLET(S): at 09:51

## 2017-12-06 RX ADMIN — Medication 1 TABLET(S): at 17:03

## 2017-12-06 RX ADMIN — Medication 1 TABLET(S): at 12:30

## 2017-12-06 RX ADMIN — Medication 12.5 MILLIGRAM(S): at 17:03

## 2017-12-06 RX ADMIN — Medication 1 GRAM(S): at 12:30

## 2017-12-06 RX ADMIN — ENOXAPARIN SODIUM 70 MILLIGRAM(S): 100 INJECTION SUBCUTANEOUS at 06:29

## 2017-12-06 RX ADMIN — Medication 1 DROP(S): at 06:29

## 2017-12-06 RX ADMIN — Medication 12.5 MILLIGRAM(S): at 07:01

## 2017-12-06 RX ADMIN — Medication 1 GRAM(S): at 17:03

## 2017-12-06 RX ADMIN — Medication 1 GRAM(S): at 06:29

## 2017-12-06 RX ADMIN — PANTOPRAZOLE SODIUM 40 MILLIGRAM(S): 20 TABLET, DELAYED RELEASE ORAL at 06:28

## 2017-12-06 NOTE — PROGRESS NOTE ADULT - PROBLEM SELECTOR PLAN 4
- on metoprolol for rate control as per EP (hold parameters for BP and HR)  - appreciate GI recs, c/w protonix bid and carafate while on A/C  - continue to monitor CBC  - coumadin with bridge monitor INR -1.49 dose coumadin 4 mg x1

## 2017-12-06 NOTE — PROGRESS NOTE ADULT - PROBLEM SELECTOR PLAN 2
- acute right MCA CVA noted on CT scan confirmed on repeat CT head  - s/p tPA and MICU monitoring  - suspicion for cardioembolism (not confirmed) as patient increased risk (high [id number] scre)  - no significant stenosis on carotid ultrasound  - TTE negative for LV or atrial thrombus  - c/w statin   -co-Pay too high for DOAC as per family started on lovenox bridge to coumadin will keep hospitalized until INR therapuetic given prior GI bleed and now on dual AC

## 2017-12-06 NOTE — PROGRESS NOTE ADULT - SUBJECTIVE AND OBJECTIVE BOX
INTERVAL HPI/OVERNIGHT EVENTS: Pt seen and examined at bedside, no new changes or complaints    MEDICATIONS  (STANDING):  artificial tears (preservative free) Ophthalmic Solution 1 Drop(s) Left EYE two times a day  enoxaparin Injectable 70 milliGRAM(s) SubCutaneous two times a day  lactobacillus acidophilus 1 Tablet(s) Oral three times a day with meals  metoprolol     tartrate 12.5 milliGRAM(s) Oral two times a day  pantoprazole    Tablet 40 milliGRAM(s) Oral two times a day before meals  sodium chloride 0.45%. 1000 milliLiter(s) (60 mL/Hr) IV Continuous <Continuous>  sucralfate 1 Gram(s) Oral four times a day    MEDICATIONS  (PRN):  acetaminophen   Tablet 650 milliGRAM(s) Oral every 6 hours PRN For Temp greater than 38 C (100.4 F)  acetaminophen   Tablet. 650 milliGRAM(s) Oral every 6 hours PRN Mild Pain (1 - 3)      Allergies    ACE inhibitors (Angioedema)  morphine (Unknown)    Intolerances    caffeine (Stomach Upset)  lactose (Other)      ROS:   General:  No wt loss, fevers, chills, night sweats, fatigue,   Eyes:  Good vision, no reported pain  ENT:  No sore throat, pain, runny nose, dysphagia  CV:  No pain, palpitations, hypo/hypertension  Resp:  No dyspnea, cough, tachypnea, wheezing  GI:  No pain, No nausea, No vomiting, No diarrhea, No constipation, No weight loss, No fever, No pruritis, No rectal bleeding, No tarry stools, No dysphagia,  :  No pain, bleeding, incontinence, nocturia  Muscle:  No pain, weakness  Neuro:  No weakness, tingling, memory problems  Psych:  No fatigue, insomnia, mood problems, depression  Endocrine:  No polyuria, polydipsia, cold/heat intolerance  Heme:  No petechiae, ecchymosis, easy bruisability  Skin:  No rash, tattoos, scars, edema      PHYSICAL EXAM:   Vital Signs:  Vital Signs Last 24 Hrs  T(C): 37.5 (06 Dec 2017 06:17), Max: 37.5 (06 Dec 2017 06:17)  T(F): 99.5 (06 Dec 2017 06:17), Max: 99.5 (06 Dec 2017 06:17)  HR: 112 (06 Dec 2017 06:59) (55 - 112)  BP: 132/99 (06 Dec 2017 06:59) (132/99 - 160/76)  BP(mean): --  RR: 17 (06 Dec 2017 06:59) (17 - 31)  SpO2: 97% (06 Dec 2017 06:59) (97% - 99%)  Daily     Daily     GENERAL:  Appears stated age, well-groomed, well-nourished, no distress  HEENT:  NC/AT,  conjunctivae clear and pink, no thyromegaly, nodules, adenopathy, no JVD, sclera -anicteric  CHEST:  Full & symmetric excursion, no increased effort, breath sounds clear  HEART:  Regular rhythm, S1, S2, no murmur/rub/S3/S4, no abdominal bruit, no edema  ABDOMEN:  Soft, non-tender, non-distended, normoactive bowel sounds,  no masses ,no hepato-splenomegaly, no signs of chronic liver disease  EXTEREMITIES:  no cyanosis,clubbing or edema  SKIN:  No rash/erythema/ecchymoses/petechiae/wounds/abscess/warm/dry  NEURO:  Alert, oriented, no asterixis, no tremor, no encephalopathy      LABS:                        9.9    5.48  )-----------( 257      ( 06 Dec 2017 06:18 )             31.1     12-06    143  |  108<H>  |  9   ----------------------------<  75  4.1   |  24  |  0.89    Ca    8.4      06 Dec 2017 06:19  Phos  2.0     12-05  Mg     1.6     12-05      PT/INR - ( 06 Dec 2017 06:17 )   PT: 16.8 SEC;   INR: 1.49                RADIOLOGY & ADDITIONAL TESTS:

## 2017-12-06 NOTE — PROGRESS NOTE ADULT - PROBLEM SELECTOR PLAN 1
PNA (concern for gram negative PNA due to aspiration) /UTI (K.PNA)  - currently afebrile, without leukocytosis   - s/p antibiotics

## 2017-12-06 NOTE — PROGRESS NOTE ADULT - SUBJECTIVE AND OBJECTIVE BOX
Patient is a 89y old  Female who presents with a chief complaint of CVA (22 Nov 2017 09:17)      SUBJECTIVE / OVERNIGHT EVENTS: Pt in NAD more alert this AM +BM no blood per rectum/blackstool. Tele +short burst of afib on tele     MEDICATIONS  (STANDING):  artificial tears (preservative free) Ophthalmic Solution 1 Drop(s) Left EYE two times a day  enoxaparin Injectable 70 milliGRAM(s) SubCutaneous two times a day  lactobacillus acidophilus 1 Tablet(s) Oral three times a day with meals  metoprolol     tartrate 12.5 milliGRAM(s) Oral two times a day  pantoprazole    Tablet 40 milliGRAM(s) Oral two times a day before meals  sodium chloride 0.45%. 1000 milliLiter(s) (60 mL/Hr) IV Continuous <Continuous>  sucralfate 1 Gram(s) Oral four times a day  warfarin 4 milliGRAM(s) Oral once    MEDICATIONS  (PRN):  acetaminophen   Tablet 650 milliGRAM(s) Oral every 6 hours PRN For Temp greater than 38 C (100.4 F)  acetaminophen   Tablet. 650 milliGRAM(s) Oral every 6 hours PRN Mild Pain (1 - 3)        CAPILLARY BLOOD GLUCOSE        I&O's Summary      T(C): 37.5 (12-06-17 @ 06:17), Max: 37.5 (12-06-17 @ 06:17)  HR: 112 (12-06-17 @ 06:59) (55 - 112)  BP: 132/99 (12-06-17 @ 06:59) (132/99 - 160/76)  RR: 17 (12-06-17 @ 06:59) (17 - 31)  SpO2: 97% (12-06-17 @ 06:59) (97% - 99%)    PHYSICAL EXAM:  GENERAL: NAD, well-developed  HEAD:  Atraumatic, Normocephalic  EYES: Rt gaze prefence  NECK: Supple, No JVD  CHEST/LUNG: Clear to auscultation bilaterally; No wheeze  HEART: Regular rate and rhythm; No murmurs, rubs, or gallops  ABDOMEN: Soft, Nontender, Nondistended; Bowel sounds present  EXTREMITIES:  2+ Peripheral Pulses, No clubbing, cyanosis, or edema  PSYCH: AAOx3  NEUROLOGY: Lt hemiparesis 2/5 mild dsyarthria      LABS:                        9.9    5.48  )-----------( 257      ( 06 Dec 2017 06:18 )             31.1     12-06    143  |  108<H>  |  9   ----------------------------<  75  4.1   |  24  |  0.89    Ca    8.4      06 Dec 2017 06:19  Phos  2.0     12-05  Mg     1.6     12-05      PT/INR - ( 06 Dec 2017 06:17 )   PT: 16.8 SEC;   INR: 1.49                      RADIOLOGY & ADDITIONAL TESTS:    Imaging Personally Reviewed:    Consultant(s) Notes Reviewed:      Care Discussed with Consultants/Other Providers:

## 2017-12-06 NOTE — PROGRESS NOTE ADULT - PROBLEM SELECTOR PLAN 3
- pt has been off a/c since 12/2016 secondary to gib   - given the patients history of a life threatening gi bleed during her hospital stay in [place] and inability to undergo endoscopic evaluation d/t her cardiac comorbidities she would be at a moderate to high risk for rebleeding if anticoagulation were to be restarted  - discussed at length with daughter with agreement and understanding that given negative occult and stable h/h role for endoscopic evaluation would not be beneficial and risks of undergoing anesthesia outweigh the benefits  - cont protonix bid while on a/c and carafate bid  - cont to trend cbc while on a/c  being bridged to coumadin with lovenox, counts remain stable

## 2017-12-06 NOTE — PROGRESS NOTE ADULT - PROBLEM SELECTOR PLAN 1
- new right MCA infarct s/p TPA in ED   - neurology workup appreciated  - TTE negative for LV or Atrial thrombus  - given the patients history of a life threatening gi bleed during her hospital stay in [place] and inability to undergo endoscopic evaluation d/t her cardiac comorbidities she would be at a moderate to high risk for rebleeding if anticoagulation were to be restarted  - occult negative (-) and has no overt/occult signs of GI bleeding   - Video Capsule EGD not performed as patient did not want to attempt swallowing due to size of the capsule  - discussed at length with daughter and patient with agreement and understanding that given negative occult and stable h/h role for endoscopic evaluation would not be beneficial and risks of undergoing anesthesia outweigh the benefits  - cont protonix bid while on a/c and carafate  - cont to trend cbc while on a/c  at present remains stable

## 2017-12-07 LAB
BUN SERPL-MCNC: 8 MG/DL — SIGNIFICANT CHANGE UP (ref 7–23)
CALCIUM SERPL-MCNC: 8.8 MG/DL — SIGNIFICANT CHANGE UP (ref 8.4–10.5)
CHLORIDE SERPL-SCNC: 107 MMOL/L — SIGNIFICANT CHANGE UP (ref 98–107)
CO2 SERPL-SCNC: 23 MMOL/L — SIGNIFICANT CHANGE UP (ref 22–31)
CREAT SERPL-MCNC: 0.84 MG/DL — SIGNIFICANT CHANGE UP (ref 0.5–1.3)
GLUCOSE SERPL-MCNC: 77 MG/DL — SIGNIFICANT CHANGE UP (ref 70–99)
HCT VFR BLD CALC: 35.3 % — SIGNIFICANT CHANGE UP (ref 34.5–45)
HGB BLD-MCNC: 10.7 G/DL — LOW (ref 11.5–15.5)
INR BLD: 1.31 — HIGH (ref 0.88–1.17)
MCHC RBC-ENTMCNC: 28.9 PG — SIGNIFICANT CHANGE UP (ref 27–34)
MCHC RBC-ENTMCNC: 30.3 % — LOW (ref 32–36)
MCV RBC AUTO: 95.4 FL — SIGNIFICANT CHANGE UP (ref 80–100)
NRBC # FLD: 0 — SIGNIFICANT CHANGE UP
PLATELET # BLD AUTO: 288 K/UL — SIGNIFICANT CHANGE UP (ref 150–400)
PMV BLD: 9.4 FL — SIGNIFICANT CHANGE UP (ref 7–13)
POTASSIUM SERPL-MCNC: 3.8 MMOL/L — SIGNIFICANT CHANGE UP (ref 3.5–5.3)
POTASSIUM SERPL-SCNC: 3.8 MMOL/L — SIGNIFICANT CHANGE UP (ref 3.5–5.3)
PROTHROM AB SERPL-ACNC: 14.7 SEC — HIGH (ref 9.8–13.1)
RBC # BLD: 3.7 M/UL — LOW (ref 3.8–5.2)
RBC # FLD: 15.5 % — HIGH (ref 10.3–14.5)
SODIUM SERPL-SCNC: 143 MMOL/L — SIGNIFICANT CHANGE UP (ref 135–145)
WBC # BLD: 6.81 K/UL — SIGNIFICANT CHANGE UP (ref 3.8–10.5)
WBC # FLD AUTO: 6.81 K/UL — SIGNIFICANT CHANGE UP (ref 3.8–10.5)

## 2017-12-07 PROCEDURE — 99233 SBSQ HOSP IP/OBS HIGH 50: CPT

## 2017-12-07 RX ORDER — WARFARIN SODIUM 2.5 MG/1
5 TABLET ORAL ONCE
Qty: 0 | Refills: 0 | Status: COMPLETED | OUTPATIENT
Start: 2017-12-07 | End: 2017-12-07

## 2017-12-07 RX ADMIN — Medication 1 GRAM(S): at 12:23

## 2017-12-07 RX ADMIN — ENOXAPARIN SODIUM 70 MILLIGRAM(S): 100 INJECTION SUBCUTANEOUS at 17:27

## 2017-12-07 RX ADMIN — Medication 12.5 MILLIGRAM(S): at 17:27

## 2017-12-07 RX ADMIN — ENOXAPARIN SODIUM 70 MILLIGRAM(S): 100 INJECTION SUBCUTANEOUS at 06:30

## 2017-12-07 RX ADMIN — Medication 1 TABLET(S): at 09:35

## 2017-12-07 RX ADMIN — Medication 650 MILLIGRAM(S): at 22:15

## 2017-12-07 RX ADMIN — PANTOPRAZOLE SODIUM 40 MILLIGRAM(S): 20 TABLET, DELAYED RELEASE ORAL at 06:29

## 2017-12-07 RX ADMIN — Medication 1 GRAM(S): at 17:27

## 2017-12-07 RX ADMIN — Medication 1 TABLET(S): at 17:27

## 2017-12-07 RX ADMIN — Medication 650 MILLIGRAM(S): at 21:18

## 2017-12-07 RX ADMIN — Medication 1 DROP(S): at 17:28

## 2017-12-07 RX ADMIN — Medication 1 TABLET(S): at 12:23

## 2017-12-07 RX ADMIN — PANTOPRAZOLE SODIUM 40 MILLIGRAM(S): 20 TABLET, DELAYED RELEASE ORAL at 17:28

## 2017-12-07 RX ADMIN — WARFARIN SODIUM 5 MILLIGRAM(S): 2.5 TABLET ORAL at 17:27

## 2017-12-07 RX ADMIN — Medication 1 GRAM(S): at 06:29

## 2017-12-07 RX ADMIN — Medication 1 DROP(S): at 06:29

## 2017-12-07 NOTE — PROGRESS NOTE ADULT - PROBLEM SELECTOR PLAN 3
- acute right MCA CVA noted on CT scan confirmed on repeat CT head  - s/p tPA and MICU monitoring  - suspicion for cardioembolism (not confirmed) as patient increased risk (high [id number] scre)  - no significant stenosis on carotid ultrasound  - TTE negative for LV or atrial thrombus  - c/w statin   -co-Pay too high for DOAC as per family started on lovenox bridge to coumadin will keep hospitalized until INR therapuetic given prior GI bleed and now on dual AC

## 2017-12-07 NOTE — PROGRESS NOTE ADULT - PROBLEM SELECTOR PLAN 1
- new right MCA infarct s/p TPA in ED   - neurology workup appreciated  - TTE negative for LV or Atrial thrombus  - given the patients history of a life threatening gi bleed during her hospital stay in [place] and inability to undergo endoscopic evaluation d/t her cardiac comorbidities she would be at a moderate to high risk for rebleeding if anticoagulation were to be restarted  - occult negative (-) and has no overt/occult signs of GI bleeding   - Video Capsule EGD not performed as patient did not want to attempt swallowing due to size of the capsule  - discussed at length with daughter and patient with agreement and understanding that given negative occult and stable h/h role for endoscopic evaluation would not be beneficial and risks of undergoing anesthesia outweigh the benefits  - cont protonix bid while on a/c and carafate  - cont to trend cbc while on a/c  at present remains stable

## 2017-12-07 NOTE — PROGRESS NOTE ADULT - ASSESSMENT
89 yo F prior hx CVA, PUD, prior GIB, OA/RA, p/w left sided weakness, s/p tPA, found to have a right MCA infarct in imaging. Course c/b fever (2/2 aspiration PNA vs UTI), (CXR concerning for possible PNA, Ucx growing >100K Klebsiella), clinically improving on Zosyn. 89 yo F prior hx CVA, PUD, prior GIB, OA/RA, p/w left sided weakness, s/p tPA, found to have a right MCA infarct in imaging. Course c/b fever (2/2 aspiration PNA vs UTI), (CXR concerning for possible PNA, Ucx growing >100K Klebsiella), clinically improved on Zosyn.

## 2017-12-07 NOTE — PROGRESS NOTE ADULT - PROBLEM SELECTOR PLAN 3
- pt has been off a/c since 12/2016 secondary to gib   - given the patients history of a life threatening gi bleed during her hospital stay in [place] and inability to undergo endoscopic evaluation d/t her cardiac comorbidities she would be at a moderate to high risk for rebleeding if anticoagulation were to be restarted  - discussed at length with daughter with agreement and understanding that given negative occult and stable h/h role for endoscopic evaluation would not be beneficial and risks of undergoing anesthesia outweigh the benefits  - cont protonix bid while on a/c and carafate bid  - cont to trend cbc while on a/c  being bridged to coumadin with lovenox, counts remain stable, dose coumadin per primary team

## 2017-12-07 NOTE — PROGRESS NOTE ADULT - SUBJECTIVE AND OBJECTIVE BOX
INTERVAL HPI/OVERNIGHT EVENTS: Pt seen and examined at bedside, no new complaints    MEDICATIONS  (STANDING):  artificial tears (preservative free) Ophthalmic Solution 1 Drop(s) Left EYE two times a day  enoxaparin Injectable 70 milliGRAM(s) SubCutaneous two times a day  lactobacillus acidophilus 1 Tablet(s) Oral three times a day with meals  metoprolol     tartrate 12.5 milliGRAM(s) Oral two times a day  pantoprazole    Tablet 40 milliGRAM(s) Oral two times a day before meals  sodium chloride 0.45%. 1000 milliLiter(s) (60 mL/Hr) IV Continuous <Continuous>  sucralfate 1 Gram(s) Oral four times a day  warfarin 5 milliGRAM(s) Oral once    MEDICATIONS  (PRN):  acetaminophen   Tablet 650 milliGRAM(s) Oral every 6 hours PRN For Temp greater than 38 C (100.4 F)  acetaminophen   Tablet. 650 milliGRAM(s) Oral every 6 hours PRN Mild Pain (1 - 3)      Allergies    ACE inhibitors (Angioedema)  morphine (Unknown)    Intolerances    caffeine (Stomach Upset)  lactose (Other)      ROS:   General:  No wt loss, fevers, chills, night sweats, fatigue,   Eyes:  Good vision, no reported pain  ENT:  No sore throat, pain, runny nose, dysphagia  CV:  No pain, palpitations, hypo/hypertension  Resp:  No dyspnea, cough, tachypnea, wheezing  GI:  No pain, No nausea, No vomiting, No diarrhea, No constipation, No weight loss, No fever, No pruritis, No rectal bleeding, No tarry stools, No dysphagia,  :  No pain, bleeding, incontinence, nocturia  Muscle:  No pain, weakness  Neuro:  No weakness, tingling, memory problems  Psych:  No fatigue, insomnia, mood problems, depression  Endocrine:  No polyuria, polydipsia, cold/heat intolerance  Heme:  No petechiae, ecchymosis, easy bruisability  Skin:  No rash, tattoos, scars, edema      PHYSICAL EXAM:   Vital Signs:  Vital Signs Last 24 Hrs  T(C): 37.2 (07 Dec 2017 06:25), Max: 37.2 (07 Dec 2017 06:25)  T(F): 98.9 (07 Dec 2017 06:25), Max: 98.9 (07 Dec 2017 06:25)  HR: 56 (07 Dec 2017 06:25) (55 - 60)  BP: 166/69 (07 Dec 2017 06:25) (150/67 - 166/69)  BP(mean): --  RR: 18 (07 Dec 2017 06:25) (18 - 18)  SpO2: 100% (07 Dec 2017 06:25) (100% - 100%)  Daily     Daily     GENERAL:  Appears stated age, well-groomed, well-nourished, no distress  HEENT:  NC/AT,  conjunctivae clear and pink, no thyromegaly, nodules, adenopathy, no JVD, sclera -anicteric  CHEST:  Full & symmetric excursion, no increased effort, breath sounds clear  HEART:  Regular rhythm, S1, S2, no murmur/rub/S3/S4, no abdominal bruit, no edema  ABDOMEN:  Soft, non-tender, non-distended, normoactive bowel sounds,  no masses ,no hepato-splenomegaly, no signs of chronic liver disease  EXTEREMITIES:  no cyanosis,clubbing or edema  SKIN:  No rash/erythema/ecchymoses/petechiae/wounds/abscess/warm/dry  NEURO:  Alert, oriented, no asterixis, no tremor, no encephalopathy      LABS:                        10.7   6.81  )-----------( 288      ( 07 Dec 2017 07:23 )             35.3     12-07    143  |  107  |  8   ----------------------------<  77  3.8   |  23  |  0.84    Ca    8.8      07 Dec 2017 07:23      PT/INR - ( 07 Dec 2017 07:23 )   PT: 14.7 SEC;   INR: 1.31                RADIOLOGY & ADDITIONAL TESTS:

## 2017-12-07 NOTE — PROGRESS NOTE ADULT - SUBJECTIVE AND OBJECTIVE BOX
Patient is a 89y old  Female who presents with a chief complaint of CVA (22 Nov 2017 09:17)      SUBJECTIVE / OVERNIGHT EVENTS: Pt in NAD. Tele NSVT rates primarily 60's    MEDICATIONS  (STANDING):  artificial tears (preservative free) Ophthalmic Solution 1 Drop(s) Left EYE two times a day  enoxaparin Injectable 70 milliGRAM(s) SubCutaneous two times a day  lactobacillus acidophilus 1 Tablet(s) Oral three times a day with meals  metoprolol     tartrate 12.5 milliGRAM(s) Oral two times a day  pantoprazole    Tablet 40 milliGRAM(s) Oral two times a day before meals  sodium chloride 0.45%. 1000 milliLiter(s) (60 mL/Hr) IV Continuous <Continuous>  sucralfate 1 Gram(s) Oral four times a day    MEDICATIONS  (PRN):  acetaminophen   Tablet 650 milliGRAM(s) Oral every 6 hours PRN For Temp greater than 38 C (100.4 F)  acetaminophen   Tablet. 650 milliGRAM(s) Oral every 6 hours PRN Mild Pain (1 - 3)        CAPILLARY BLOOD GLUCOSE        I&O's Summary      T(C): 37.2 (12-07-17 @ 06:25), Max: 37.2 (12-07-17 @ 06:25)  HR: 56 (12-07-17 @ 06:25) (55 - 60)  BP: 166/69 (12-07-17 @ 06:25) (150/67 - 166/69)  RR: 18 (12-07-17 @ 06:25) (18 - 18)  SpO2: 100% (12-07-17 @ 06:25) (100% - 100%)    PHYSICAL EXAM:  GENERAL: NAD, well-developed  HEAD:  Atraumatic, Normocephalic  EYES: EOMI, PERRLA, conjunctiva and sclera clear  NECK: Supple, No JVD  CHEST/LUNG: Clear to auscultation bilaterally; No wheeze  HEART: Regular rate and rhythm; No murmurs, rubs, or gallops  ABDOMEN: Soft, Nontender, Nondistended; Bowel sounds present  EXTREMITIES:  2+ Peripheral Pulses, No clubbing, cyanosis, or edema      LABS:                        10.7   6.81  )-----------( 288      ( 07 Dec 2017 07:23 )             35.3     12-07    143  |  107  |  8   ----------------------------<  77  3.8   |  23  |  0.84    Ca    8.8      07 Dec 2017 07:23      PT/INR - ( 07 Dec 2017 07:23 )   PT: 14.7 SEC;   INR: 1.31                      RADIOLOGY & ADDITIONAL TESTS:    Imaging Personally Reviewed:    Consultant(s) Notes Reviewed:      Care Discussed with Consultants/Other Providers: Patient is a 89y old  Female who presents with a chief complaint of CVA (22 Nov 2017 09:17)      SUBJECTIVE / OVERNIGHT EVENTS: Pt in NAD. Tele rates primarily 60's    MEDICATIONS  (STANDING):  artificial tears (preservative free) Ophthalmic Solution 1 Drop(s) Left EYE two times a day  enoxaparin Injectable 70 milliGRAM(s) SubCutaneous two times a day  lactobacillus acidophilus 1 Tablet(s) Oral three times a day with meals  metoprolol     tartrate 12.5 milliGRAM(s) Oral two times a day  pantoprazole    Tablet 40 milliGRAM(s) Oral two times a day before meals  sodium chloride 0.45%. 1000 milliLiter(s) (60 mL/Hr) IV Continuous <Continuous>  sucralfate 1 Gram(s) Oral four times a day    MEDICATIONS  (PRN):  acetaminophen   Tablet 650 milliGRAM(s) Oral every 6 hours PRN For Temp greater than 38 C (100.4 F)  acetaminophen   Tablet. 650 milliGRAM(s) Oral every 6 hours PRN Mild Pain (1 - 3)        CAPILLARY BLOOD GLUCOSE        I&O's Summary      T(C): 37.2 (12-07-17 @ 06:25), Max: 37.2 (12-07-17 @ 06:25)  HR: 56 (12-07-17 @ 06:25) (55 - 60)  BP: 166/69 (12-07-17 @ 06:25) (150/67 - 166/69)  RR: 18 (12-07-17 @ 06:25) (18 - 18)  SpO2: 100% (12-07-17 @ 06:25) (100% - 100%)    PHYSICAL EXAM:  GENERAL: NAD, well-developed  HEAD:  Atraumatic, Normocephalic  EYES: Rt gaze prefence  NECK: Supple, No JVD  CHEST/LUNG: Clear to auscultation bilaterally; No wheeze  HEART: Regular rate and rhythm; No murmurs, rubs, or gallops  ABDOMEN: Soft, Nontender, Nondistended; Bowel sounds present  EXTREMITIES:  2+ Peripheral Pulses, No clubbing, cyanosis, or edema  PSYCH: AAOx3  NEUROLOGY: Lt hemiparesis 2/5 mild dsyarthria      LABS:                        10.7   6.81  )-----------( 288      ( 07 Dec 2017 07:23 )             35.3     12-07    143  |  107  |  8   ----------------------------<  77  3.8   |  23  |  0.84    Ca    8.8      07 Dec 2017 07:23      PT/INR - ( 07 Dec 2017 07:23 )   PT: 14.7 SEC;   INR: 1.31                      RADIOLOGY & ADDITIONAL TESTS:    Imaging Personally Reviewed:    Consultant(s) Notes Reviewed:      Care Discussed with Consultants/Other Providers:

## 2017-12-07 NOTE — PROGRESS NOTE ADULT - PROBLEM SELECTOR PLAN 1
- on metoprolol for rate control as per EP (hold parameters for BP and HR)  - appreciate GI recs, c/w protonix bid and carafate while on A/C  - continue to monitor CBC  - coumadin with bridge monitor INR -1.31 dose coumadin 4 mg x1

## 2017-12-08 LAB
BUN SERPL-MCNC: 8 MG/DL — SIGNIFICANT CHANGE UP (ref 7–23)
CALCIUM SERPL-MCNC: 9.3 MG/DL — SIGNIFICANT CHANGE UP (ref 8.4–10.5)
CHLORIDE SERPL-SCNC: 106 MMOL/L — SIGNIFICANT CHANGE UP (ref 98–107)
CO2 SERPL-SCNC: 24 MMOL/L — SIGNIFICANT CHANGE UP (ref 22–31)
CREAT SERPL-MCNC: 0.81 MG/DL — SIGNIFICANT CHANGE UP (ref 0.5–1.3)
GLUCOSE SERPL-MCNC: 95 MG/DL — SIGNIFICANT CHANGE UP (ref 70–99)
HCT VFR BLD CALC: 43 % — SIGNIFICANT CHANGE UP (ref 34.5–45)
HGB BLD-MCNC: 13.2 G/DL — SIGNIFICANT CHANGE UP (ref 11.5–15.5)
INR BLD: 1.27 — HIGH (ref 0.88–1.17)
MCHC RBC-ENTMCNC: 29.8 PG — SIGNIFICANT CHANGE UP (ref 27–34)
MCHC RBC-ENTMCNC: 30.7 % — LOW (ref 32–36)
MCV RBC AUTO: 97.1 FL — SIGNIFICANT CHANGE UP (ref 80–100)
NRBC # FLD: 0 — SIGNIFICANT CHANGE UP
PLATELET # BLD AUTO: 297 K/UL — SIGNIFICANT CHANGE UP (ref 150–400)
PMV BLD: 8.8 FL — SIGNIFICANT CHANGE UP (ref 7–13)
POTASSIUM SERPL-MCNC: 3.6 MMOL/L — SIGNIFICANT CHANGE UP (ref 3.5–5.3)
POTASSIUM SERPL-SCNC: 3.6 MMOL/L — SIGNIFICANT CHANGE UP (ref 3.5–5.3)
PROTHROM AB SERPL-ACNC: 14.2 SEC — HIGH (ref 9.8–13.1)
RBC # BLD: 4.43 M/UL — SIGNIFICANT CHANGE UP (ref 3.8–5.2)
RBC # FLD: 15.3 % — HIGH (ref 10.3–14.5)
SODIUM SERPL-SCNC: 144 MMOL/L — SIGNIFICANT CHANGE UP (ref 135–145)
WBC # BLD: 6.06 K/UL — SIGNIFICANT CHANGE UP (ref 3.8–10.5)
WBC # FLD AUTO: 6.06 K/UL — SIGNIFICANT CHANGE UP (ref 3.8–10.5)

## 2017-12-08 RX ORDER — WARFARIN SODIUM 2.5 MG/1
5 TABLET ORAL ONCE
Qty: 0 | Refills: 0 | Status: COMPLETED | OUTPATIENT
Start: 2017-12-08 | End: 2017-12-08

## 2017-12-08 RX ADMIN — Medication 12.5 MILLIGRAM(S): at 17:45

## 2017-12-08 RX ADMIN — PANTOPRAZOLE SODIUM 40 MILLIGRAM(S): 20 TABLET, DELAYED RELEASE ORAL at 06:30

## 2017-12-08 RX ADMIN — Medication 1 GRAM(S): at 13:16

## 2017-12-08 RX ADMIN — Medication 1 TABLET(S): at 17:44

## 2017-12-08 RX ADMIN — ENOXAPARIN SODIUM 70 MILLIGRAM(S): 100 INJECTION SUBCUTANEOUS at 17:44

## 2017-12-08 RX ADMIN — Medication 1 DROP(S): at 06:30

## 2017-12-08 RX ADMIN — Medication 1 GRAM(S): at 17:46

## 2017-12-08 RX ADMIN — Medication 1 GRAM(S): at 06:30

## 2017-12-08 RX ADMIN — Medication 1 TABLET(S): at 09:55

## 2017-12-08 RX ADMIN — WARFARIN SODIUM 5 MILLIGRAM(S): 2.5 TABLET ORAL at 17:44

## 2017-12-08 RX ADMIN — Medication 1 TABLET(S): at 13:16

## 2017-12-08 RX ADMIN — Medication 1 GRAM(S): at 23:42

## 2017-12-08 RX ADMIN — PANTOPRAZOLE SODIUM 40 MILLIGRAM(S): 20 TABLET, DELAYED RELEASE ORAL at 17:44

## 2017-12-08 RX ADMIN — Medication 1 DROP(S): at 17:45

## 2017-12-08 RX ADMIN — ENOXAPARIN SODIUM 70 MILLIGRAM(S): 100 INJECTION SUBCUTANEOUS at 06:30

## 2017-12-08 NOTE — PROGRESS NOTE ADULT - PROBLEM SELECTOR PLAN 3
- pt has been off a/c since 12/2016 secondary to gib   - given the patients history of a life threatening gi bleed during her hospital stay in [place] and inability to undergo endoscopic evaluation d/t her cardiac comorbidities she would be at a moderate to high risk for rebleeding if anticoagulation were to be restarted  - discussed at length with daughter with agreement and understanding that given negative occult and stable h/h role for endoscopic evaluation would not be beneficial and risks of undergoing anesthesia outweigh the benefits  - cont protonix bid while on a/c and carafate bid  - cont to trend cbc while on a/c  being bridged to coumadin with lovenox, counts remain stable, dose coumadin per primary team

## 2017-12-08 NOTE — PROGRESS NOTE ADULT - PROBLEM SELECTOR PLAN 6
- in past has been on MTX, prednisone and leflunomide  - hold prednisone as started on A/C per daughter  - consider restarting MTX/leflunomide daughter to get rheumatologist number and spoke to pharmacist in regards to RA meds with interfering with AC

## 2017-12-08 NOTE — PROGRESS NOTE ADULT - ASSESSMENT
89 yo F prior hx CVA, PUD, prior GIB, OA/RA, p/w left sided weakness, s/p tPA, found to have a right MCA infarct in imaging. Course c/b fever (2/2 aspiration PNA vs UTI), (CXR concerning for possible PNA, Ucx growing >100K Klebsiella), clinically improved on Zosyn.

## 2017-12-08 NOTE — PROGRESS NOTE ADULT - PROBLEM SELECTOR PLAN 1
- on metoprolol for rate control as per EP (hold parameters for BP and HR)  - appreciate GI recs, c/w protonix bid and carafate while on A/C  - continue to monitor CBC  - coumadin with bridge monitor INR -1.31 dose coumadin

## 2017-12-08 NOTE — CHART NOTE - NSCHARTNOTEFT_GEN_A_CORE
NUTRITION SERVICES                                                                                  MALNUTRITION ALERT     Attention Health Care Provider: Upon nutritional assessment by the Registered Dietitian your patient was determined to meet criteria / has evidence of the following diagnosis/diagnoses:    [ ] Mild Protein Calorie Malnutrition   [ X ] Moderate Protein Calorie Malnutrition   [ ] Severe Protein Calorie Malnutrition   [ ] Unspecified Protein Calorie Malnutrition   [ ] Underweight / BMI <19  [ ] Morbid Obesity / BMI >40      By signing this assessment you are acknowledging the diagnosis/diagnoses.       PLAN OF CARE: Refer to Initial Dietitian Evaluation or Nutrition Follow-Up Documentation for Nutritional Recommendations.    1) Continue Dysphagia 1 Puree, Honey Consistency diet   2) Will provide the following preferences discussed with daughter (yogurt at breakfast 3x/week, extra Honey Consistency thickening packets, Vanilla Ensure Pudding, Pureed Desserts, Baby sweet potatoes)

## 2017-12-08 NOTE — PROGRESS NOTE ADULT - SUBJECTIVE AND OBJECTIVE BOX
Patient is a 89y old  Female who presents with a chief complaint of CVA (22 Nov 2017 09:17)      SUBJECTIVE / OVERNIGHT EVENTS: Pt in NAD     MEDICATIONS  (STANDING):  artificial tears (preservative free) Ophthalmic Solution 1 Drop(s) Left EYE two times a day  enoxaparin Injectable 70 milliGRAM(s) SubCutaneous two times a day  lactobacillus acidophilus 1 Tablet(s) Oral three times a day with meals  metoprolol     tartrate 12.5 milliGRAM(s) Oral two times a day  pantoprazole    Tablet 40 milliGRAM(s) Oral two times a day before meals  sodium chloride 0.45%. 1000 milliLiter(s) (60 mL/Hr) IV Continuous <Continuous>  sucralfate 1 Gram(s) Oral four times a day  warfarin 5 milliGRAM(s) Oral once    MEDICATIONS  (PRN):  acetaminophen   Tablet 650 milliGRAM(s) Oral every 6 hours PRN For Temp greater than 38 C (100.4 F)  acetaminophen   Tablet. 650 milliGRAM(s) Oral every 6 hours PRN Mild Pain (1 - 3)        CAPILLARY BLOOD GLUCOSE        I&O's Summary      T(C): 36.7 (12-08-17 @ 06:28), Max: 37.1 (12-07-17 @ 17:28)  HR: 59 (12-08-17 @ 06:28) (59 - 67)  BP: 143/62 (12-08-17 @ 06:28) (143/62 - 161/85)  RR: 18 (12-08-17 @ 06:28) (17 - 18)  SpO2: 98% (12-08-17 @ 06:28) (98% - 100%)    PHYSICAL EXAM:    LABS:                        13.2   6.06  )-----------( 297      ( 08 Dec 2017 11:54 )             43.0     12-08    144  |  106  |  8   ----------------------------<  95  3.6   |  24  |  0.81    Ca    9.3      08 Dec 2017 11:54      PT/INR - ( 08 Dec 2017 11:54 )   PT: 14.2 SEC;   INR: 1.27                      RADIOLOGY & ADDITIONAL TESTS:    Imaging Personally Reviewed:    Consultant(s) Notes Reviewed:      Care Discussed with Consultants/Other Providers: Patient is a 89y old  Female who presents with a chief complaint of CVA (22 Nov 2017 09:17)      SUBJECTIVE / OVERNIGHT EVENTS: Pt in NAD     MEDICATIONS  (STANDING):  artificial tears (preservative free) Ophthalmic Solution 1 Drop(s) Left EYE two times a day  enoxaparin Injectable 70 milliGRAM(s) SubCutaneous two times a day  lactobacillus acidophilus 1 Tablet(s) Oral three times a day with meals  metoprolol     tartrate 12.5 milliGRAM(s) Oral two times a day  pantoprazole    Tablet 40 milliGRAM(s) Oral two times a day before meals  sodium chloride 0.45%. 1000 milliLiter(s) (60 mL/Hr) IV Continuous <Continuous>  sucralfate 1 Gram(s) Oral four times a day  warfarin 5 milliGRAM(s) Oral once    MEDICATIONS  (PRN):  acetaminophen   Tablet 650 milliGRAM(s) Oral every 6 hours PRN For Temp greater than 38 C (100.4 F)  acetaminophen   Tablet. 650 milliGRAM(s) Oral every 6 hours PRN Mild Pain (1 - 3)        CAPILLARY BLOOD GLUCOSE        I&O's Summary      T(C): 36.7 (12-08-17 @ 06:28), Max: 37.1 (12-07-17 @ 17:28)  HR: 59 (12-08-17 @ 06:28) (59 - 67)  BP: 143/62 (12-08-17 @ 06:28) (143/62 - 161/85)  RR: 18 (12-08-17 @ 06:28) (17 - 18)  SpO2: 98% (12-08-17 @ 06:28) (98% - 100%)    PHYSICAL EXAM:  GENERAL: NAD, well-developed  HEAD:  Atraumatic, Normocephalic  EYES: Rt gaze prefence  NECK: Supple, No JVD  CHEST/LUNG: Clear to auscultation bilaterally; No wheeze  HEART: Regular rate and rhythm; No murmurs, rubs, or gallops  ABDOMEN: Soft, Nontender, Nondistended; Bowel sounds present  EXTREMITIES:  2+ Peripheral Pulses, No clubbing, cyanosis, or edema  PSYCH: AAOx3  NEUROLOGY: Lt hemiparesis 2/5     LABS:                        13.2   6.06  )-----------( 297      ( 08 Dec 2017 11:54 )             43.0     12-08    144  |  106  |  8   ----------------------------<  95  3.6   |  24  |  0.81    Ca    9.3      08 Dec 2017 11:54      PT/INR - ( 08 Dec 2017 11:54 )   PT: 14.2 SEC;   INR: 1.27                      RADIOLOGY & ADDITIONAL TESTS:    Imaging Personally Reviewed:    Consultant(s) Notes Reviewed:      Care Discussed with Consultants/Other Providers:

## 2017-12-08 NOTE — PROGRESS NOTE ADULT - PROBLEM SELECTOR PLAN 3
- acute right MCA CVA noted on CT scan confirmed on repeat CT head  - s/p tPA and MICU monitoring  - suspicion for cardioembolism (not confirmed) as patient increased risk (high [id number] scre)  - no significant stenosis on carotid ultrasound  - TTE negative for LV or atrial thrombus  - c/w statin   -co-Pay too high for DOAC as per family started on lovenox bridge to coumadin will keep hospitalized until INR therapuetic given prior GI bleed and now on dual AC

## 2017-12-08 NOTE — CHART NOTE - NSCHARTNOTEFT_GEN_A_CORE
Source: Patient [ ]    Family [ X ] daughter     other [  ]    Patient seen for length of stay follow-up. Met with daughter at bedside who is present at all meals. Daughter is very involved in care, patiently feeds and provides persistent encouragement at all meal times. Patient continues to tolerate Dysphagia 1 Puree, Honey Consistency diet per SLP recommendations. Although PO intake varies, the past 2 days reports better accept and 100% of breakfast this morning. Food preferences obtained in effort to optimize PO intake (baby jar food varieties, sweet potato, pureed desserts, puddings, extra Honey Consistency thickening packets, yogurt at breakfast).       CURRENT DIET : Dysphagia 1 Puree, Honey Consistency; Ensure Enlive 240mls 2x daily (700kcal, 40g protein)- likes vanilla only        _______WEIGHTS:________  (11/21) 161.8#/ 73.4kg   No new weight in chart  Bedscale indicated 79.3kg reflects weight gain possible       EDEMA: 1+ generalized   SKIN: no pressure injuries noted       _______PERTINENT MEDICATIONS:________     metoprolol     tartrate  pantoprazole    Tablet  sodium chloride 0.45%.  sucralfate      _______PERTINENT LABS:_________    Sodium, Serum: 144 (12-08 @ 11:54)  Potassium, Serum: 3.6 (12-08 @ 11:54)  Glucose, Serum: 95 (12-08 @ 11:54)  Blood Urea Nitrogen, Serum: 8 (12-08 @ 11:54)      Hemoglobin A1C, Whole Blood: 5.4 % [4.0 - 5.6] (11-21-17 @ 02:33)      Estimated Needs:   [ X ] no change since previous assessment    Previous Nutrition Diagnosis:   [ X ] Issues chewing/swallowing   Nutrition Diagnosis is [ X ] ongoing  [ ] resolved [ ] not applicable          New Nutrition Diagnosis:   [ X ] Malnutrition, moderate  related to patient meets criteria for moderate malnutrition in context of chronic illness   As evidenced by 1+ generalized edema, <75% of nutrition needs >1 month        ___________________ADDITIONAL RECOMMENDATIONS___________________    1) Continue Dysphagia 1 Puree, Honey Consistency diet   2) Will provide the following preferences discussed with daughter (yogurt at breakfast 3x/week, extra Honey Consistency thickening packets, Vanilla Ensure Pudding, Pureed Desserts, Baby sweet potatoes)     Liudmila Louis RDN, CDN, CDE (Pager [phone])

## 2017-12-08 NOTE — PROGRESS NOTE ADULT - PROBLEM SELECTOR PLAN 1
- new right MCA infarct s/p TPA in ED   - neurology workup appreciated  - TTE negative for LV or Atrial thrombus  - given the patients history of a life threatening gi bleed during her hospital stay in [place] and inability to undergo endoscopic evaluation d/t her cardiac comorbidities she would be at a moderate to high risk for rebleeding if anticoagulation were to be restarted  - occult negative (-) and has no overt/occult signs of GI bleeding   - Video Capsule EGD not performed as patient did not want to attempt swallowing due to size of the capsule  - discussed at length with daughter and patient with agreement and understanding that given negative occult and stable h/h role for endoscopic evaluation would not be beneficial and risks of undergoing anesthesia outweigh the benefits  - cont protonix bid while on a/c and carafate  - cont to trend cbc while on a/c  at present remains stable

## 2017-12-08 NOTE — PROGRESS NOTE ADULT - PROBLEM SELECTOR PLAN 5
- mild hypernatremia (was 146 gentle IVF encourage PO intake)-->s/p IVF Na now 143 - mild hypernatremia (was 146 gentle IVF encourage PO intake)-->s/p IVF -->>resolved

## 2017-12-09 DIAGNOSIS — E87.6 HYPOKALEMIA: ICD-10-CM

## 2017-12-09 DIAGNOSIS — Z71.89 OTHER SPECIFIED COUNSELING: ICD-10-CM

## 2017-12-09 DIAGNOSIS — E44.0 MODERATE PROTEIN-CALORIE MALNUTRITION: ICD-10-CM

## 2017-12-09 LAB
BUN SERPL-MCNC: 7 MG/DL — SIGNIFICANT CHANGE UP (ref 7–23)
CALCIUM SERPL-MCNC: 8.9 MG/DL — SIGNIFICANT CHANGE UP (ref 8.4–10.5)
CHLORIDE SERPL-SCNC: 109 MMOL/L — HIGH (ref 98–107)
CK MB BLD-MCNC: 1.49 NG/ML — SIGNIFICANT CHANGE UP (ref 1–4.7)
CK MB BLD-MCNC: SIGNIFICANT CHANGE UP (ref 0–2.5)
CK SERPL-CCNC: 38 U/L — SIGNIFICANT CHANGE UP (ref 25–170)
CO2 SERPL-SCNC: 23 MMOL/L — SIGNIFICANT CHANGE UP (ref 22–31)
CREAT SERPL-MCNC: 0.76 MG/DL — SIGNIFICANT CHANGE UP (ref 0.5–1.3)
GLUCOSE SERPL-MCNC: 95 MG/DL — SIGNIFICANT CHANGE UP (ref 70–99)
HCT VFR BLD CALC: 36.2 % — SIGNIFICANT CHANGE UP (ref 34.5–45)
HGB BLD-MCNC: 11 G/DL — LOW (ref 11.5–15.5)
INR BLD: 1.75 — HIGH (ref 0.88–1.17)
MCHC RBC-ENTMCNC: 29 PG — SIGNIFICANT CHANGE UP (ref 27–34)
MCHC RBC-ENTMCNC: 30.4 % — LOW (ref 32–36)
MCV RBC AUTO: 95.5 FL — SIGNIFICANT CHANGE UP (ref 80–100)
NRBC # FLD: 0 — SIGNIFICANT CHANGE UP
PLATELET # BLD AUTO: 298 K/UL — SIGNIFICANT CHANGE UP (ref 150–400)
PMV BLD: 9.6 FL — SIGNIFICANT CHANGE UP (ref 7–13)
POTASSIUM SERPL-MCNC: 3.3 MMOL/L — LOW (ref 3.5–5.3)
POTASSIUM SERPL-SCNC: 3.3 MMOL/L — LOW (ref 3.5–5.3)
PROTHROM AB SERPL-ACNC: 19.6 SEC — HIGH (ref 9.8–13.1)
RBC # BLD: 3.79 M/UL — LOW (ref 3.8–5.2)
RBC # FLD: 15.3 % — HIGH (ref 10.3–14.5)
SODIUM SERPL-SCNC: 144 MMOL/L — SIGNIFICANT CHANGE UP (ref 135–145)
TROPONIN T SERPL-MCNC: < 0.06 NG/ML — SIGNIFICANT CHANGE UP (ref 0–0.06)
WBC # BLD: 6.1 K/UL — SIGNIFICANT CHANGE UP (ref 3.8–10.5)
WBC # FLD AUTO: 6.1 K/UL — SIGNIFICANT CHANGE UP (ref 3.8–10.5)

## 2017-12-09 PROCEDURE — 93010 ELECTROCARDIOGRAM REPORT: CPT

## 2017-12-09 PROCEDURE — 99497 ADVNCD CARE PLAN 30 MIN: CPT

## 2017-12-09 PROCEDURE — 99233 SBSQ HOSP IP/OBS HIGH 50: CPT

## 2017-12-09 RX ORDER — WARFARIN SODIUM 2.5 MG/1
5 TABLET ORAL ONCE
Qty: 0 | Refills: 0 | Status: DISCONTINUED | OUTPATIENT
Start: 2017-12-09 | End: 2017-12-09

## 2017-12-09 RX ORDER — POTASSIUM CHLORIDE 20 MEQ
40 PACKET (EA) ORAL ONCE
Qty: 0 | Refills: 0 | Status: COMPLETED | OUTPATIENT
Start: 2017-12-09 | End: 2017-12-09

## 2017-12-09 RX ORDER — POTASSIUM CHLORIDE 20 MEQ
40 PACKET (EA) ORAL ONCE
Qty: 0 | Refills: 0 | Status: DISCONTINUED | OUTPATIENT
Start: 2017-12-09 | End: 2017-12-09

## 2017-12-09 RX ORDER — WARFARIN SODIUM 2.5 MG/1
5 TABLET ORAL ONCE
Qty: 0 | Refills: 0 | Status: COMPLETED | OUTPATIENT
Start: 2017-12-09 | End: 2017-12-09

## 2017-12-09 RX ADMIN — Medication 1 TABLET(S): at 18:11

## 2017-12-09 RX ADMIN — Medication 1 DROP(S): at 23:11

## 2017-12-09 RX ADMIN — Medication 1 DROP(S): at 06:26

## 2017-12-09 RX ADMIN — Medication 1 TABLET(S): at 13:02

## 2017-12-09 RX ADMIN — Medication 12.5 MILLIGRAM(S): at 18:11

## 2017-12-09 RX ADMIN — Medication 1 GRAM(S): at 13:02

## 2017-12-09 RX ADMIN — Medication 1 GRAM(S): at 18:11

## 2017-12-09 RX ADMIN — ENOXAPARIN SODIUM 70 MILLIGRAM(S): 100 INJECTION SUBCUTANEOUS at 18:12

## 2017-12-09 RX ADMIN — PANTOPRAZOLE SODIUM 40 MILLIGRAM(S): 20 TABLET, DELAYED RELEASE ORAL at 18:11

## 2017-12-09 RX ADMIN — WARFARIN SODIUM 5 MILLIGRAM(S): 2.5 TABLET ORAL at 18:14

## 2017-12-09 RX ADMIN — ENOXAPARIN SODIUM 70 MILLIGRAM(S): 100 INJECTION SUBCUTANEOUS at 06:19

## 2017-12-09 RX ADMIN — Medication 1 GRAM(S): at 23:11

## 2017-12-09 RX ADMIN — Medication 12.5 MILLIGRAM(S): at 06:20

## 2017-12-09 RX ADMIN — Medication 650 MILLIGRAM(S): at 23:15

## 2017-12-09 RX ADMIN — Medication 1 GRAM(S): at 06:20

## 2017-12-09 RX ADMIN — PANTOPRAZOLE SODIUM 40 MILLIGRAM(S): 20 TABLET, DELAYED RELEASE ORAL at 06:19

## 2017-12-09 RX ADMIN — Medication 40 MILLIEQUIVALENT(S): at 13:02

## 2017-12-09 RX ADMIN — Medication 1 TABLET(S): at 09:04

## 2017-12-09 NOTE — PROGRESS NOTE ADULT - PROBLEM SELECTOR PLAN 3
- being bridged to coumadin, currently subtherapeutic, dosing per primary team   - given the patients history of a life threatening gi bleed during her hospital stay in [place] and inability to undergo endoscopic evaluation d/t her cardiac comorbidities she would be at a moderate to high risk for rebleeding if anticoagulation were to be restarted  - discussed at length with daughter with agreement and understanding that given negative occult and stable h/h role for endoscopic evaluation would not be beneficial and risks of undergoing anesthesia outweigh the benefits  - cont protonix bid while on a/c and carafate bid  - cont to trend cbc while on a/c  being bridged to coumadin with lovenox, counts remain stable, dose coumadin per primary team

## 2017-12-09 NOTE — PROGRESS NOTE ADULT - PROBLEM SELECTOR PLAN 1
- on metoprolol for rate control as per EP (hold parameters for BP and HR)  - appreciate GI recs, c/w protonix bid and carafate while on A/C  - continue to monitor CBC, so far H/H stable  - dose coumadin 5mg, INR trend on the rise

## 2017-12-09 NOTE — PROGRESS NOTE ADULT - PROBLEM SELECTOR PLAN 8
Daughter concerned with number of blood draws and fact that patient is a difficult stick, arterial attempts done. Discussed option of quality vs quantity of life and palliation but daughter states she would prefer to take the risk with A/C at this time as opposed to recurrent stroke. Does not want a palliative care consult at this time. Will continue A/C with daily blood draws until INR therapeutic.  17 min spent

## 2017-12-09 NOTE — PROGRESS NOTE ADULT - SUBJECTIVE AND OBJECTIVE BOX
Patient is a 89y old  Female who presents with a chief complaint of CVA (22 Nov 2017 09:17)      SUBJECTIVE / OVERNIGHT EVENTS: No acute events overnight. No new complaints. Daughter at bedside concerned regarding daily blood draws and that patient is a difficult stick.    MEDICATIONS  (STANDING):  artificial tears (preservative free) Ophthalmic Solution 1 Drop(s) Left EYE two times a day  enoxaparin Injectable 70 milliGRAM(s) SubCutaneous two times a day  lactobacillus acidophilus 1 Tablet(s) Oral three times a day with meals  metoprolol     tartrate 12.5 milliGRAM(s) Oral two times a day  pantoprazole    Tablet 40 milliGRAM(s) Oral two times a day before meals  sucralfate 1 Gram(s) Oral four times a day  warfarin 5 milliGRAM(s) Oral once    MEDICATIONS  (PRN):  acetaminophen   Tablet 650 milliGRAM(s) Oral every 6 hours PRN For Temp greater than 38 C (100.4 F)  acetaminophen   Tablet. 650 milliGRAM(s) Oral every 6 hours PRN Mild Pain (1 - 3)      T(C): 37.2 (12-09-17 @ 06:17), Max: 37.3 (12-08-17 @ 15:23)  HR: 73 (12-09-17 @ 11:42) (63 - 74)  BP: 145/74 (12-09-17 @ 11:42) (141/88 - 160/66)  RR: 16 (12-09-17 @ 07:07) (16 - 18)  SpO2: 100% (12-09-17 @ 07:07) (99% - 100%)  CAPILLARY BLOOD GLUCOSE        I&O's Summary      PHYSICAL EXAM:  resting in bed comfortably  arousable  S1/S2  lungs clear b/l  abd soft, NT  no LE edema    LABS:                        11.0   6.10  )-----------( 298      ( 09 Dec 2017 07:15 )             36.2     12-09    144  |  109<H>  |  7   ----------------------------<  95  3.3<L>   |  23  |  0.76    Ca    8.9      09 Dec 2017 07:15      PT/INR - ( 09 Dec 2017 07:15 )   PT: 19.6 SEC;   INR: 1.75            CARDIAC MARKERS ( 09 Dec 2017 07:15 )  x     / < 0.06 ng/mL / 38 u/L / 1.49 ng/mL / x              RADIOLOGY & ADDITIONAL TESTS:

## 2017-12-09 NOTE — PROGRESS NOTE ADULT - SUBJECTIVE AND OBJECTIVE BOX
INTERVAL HPI/OVERNIGHT EVENTS: Pt seen and examined at bedside, no new changes    MEDICATIONS  (STANDING):  artificial tears (preservative free) Ophthalmic Solution 1 Drop(s) Left EYE two times a day  enoxaparin Injectable 70 milliGRAM(s) SubCutaneous two times a day  lactobacillus acidophilus 1 Tablet(s) Oral three times a day with meals  metoprolol     tartrate 12.5 milliGRAM(s) Oral two times a day  pantoprazole    Tablet 40 milliGRAM(s) Oral two times a day before meals  sucralfate 1 Gram(s) Oral four times a day  warfarin 5 milliGRAM(s) Oral once  warfarin 5 milliGRAM(s) Oral once    MEDICATIONS  (PRN):  acetaminophen   Tablet 650 milliGRAM(s) Oral every 6 hours PRN For Temp greater than 38 C (100.4 F)  acetaminophen   Tablet. 650 milliGRAM(s) Oral every 6 hours PRN Mild Pain (1 - 3)      Allergies    ACE inhibitors (Angioedema)  morphine (Unknown)    Intolerances    caffeine (Stomach Upset)  lactose (Other)      Review of Systems:    General:  No wt loss, fevers, chills, night sweats,fatigue,   Eyes:  Good vision, no reported pain  ENT:  No sore throat, pain, runny nose, dysphagia  CV:  No pain, palpitatioins, hypo/hypertension  Resp:  No dyspnea, cough, tachypnea, wheezing  GI:  No pain, No nausea, No vomiting, No diarrhea, No constipatiion, No weight loss, No fever, No pruritis, No rectal bleeding, No tarry stools, No dysphagia,  :  No pain, bleeding, incontinence, nocturia  Muscle:  No pain, weakness  Neuro:  No weakness, tingling, memory problems  Psych:  No fatigue, insomnia, mood problems, depression  Endocrine:  No polyuria, polydypsia, cold/heat intolerance  Heme:  No petechiae, ecchymosis, easy bruisability  Skin:  No rash, tattoos, scars, edema      Vital Signs Last 24 Hrs  T(C): 37.2 (09 Dec 2017 06:17), Max: 37.3 (08 Dec 2017 15:23)  T(F): 99 (09 Dec 2017 06:17), Max: 99.1 (08 Dec 2017 15:23)  HR: 73 (09 Dec 2017 11:42) (63 - 74)  BP: 145/74 (09 Dec 2017 11:42) (141/88 - 160/66)  BP(mean): --  RR: 16 (09 Dec 2017 07:07) (16 - 18)  SpO2: 100% (09 Dec 2017 07:07) (99% - 100%)    PHYSICAL EXAM:    Constitutional: NAD, well-developed  HEENT: EOMI, throat clear  Neck: No LAD, supple  Respiratory: CTA and P  Cardiovascular: S1 and S2, RRR, no M  Gastrointestinal: BS+, soft, NT/ND, neg HSM,  Extremities: No peripheral edema, neg clubing, cyanosis  Vascular: 2+ peripheral pulses  Neurological: A/O x 3, no focal deficits  Psychiatric: Normal mood, normal affect  Skin: No rashes      LABS:                        11.0   6.10  )-----------( 298      ( 09 Dec 2017 07:15 )             36.2     12-09    144  |  109<H>  |  7   ----------------------------<  95  3.3<L>   |  23  |  0.76    Ca    8.9      09 Dec 2017 07:15      PT/INR - ( 09 Dec 2017 07:15 )   PT: 19.6 SEC;   INR: 1.75                RADIOLOGY & ADDITIONAL TESTS:

## 2017-12-09 NOTE — PROGRESS NOTE ADULT - ASSESSMENT
89 yo F prior hx CVA, PUD, prior GIB, OA/RA, p/w left sided weakness, s/p tPA, found to have a right MCA infarct in imaging. Course c/b fever (2/2 aspiration PNA vs UTI), (CXR concerning for possible PNA, Ucx growing >100K Klebsiella), clinically improved on Zosyn.

## 2017-12-09 NOTE — PROGRESS NOTE ADULT - PROBLEM SELECTOR PLAN 2
- acute right MCA CVA noted on CT scan confirmed on repeat CT head  - s/p tPA and MICU monitoring  - suspicion for cardioembolism (not confirmed) as patient increased risk (high [id number] scre)  - no significant stenosis on carotid ultrasound  - TTE negative for LV or atrial thrombus  - c/w statin   -co-Pay too high for DOAC as per family started on lovenox bridge to coumadin will keep hospitalized until INR therapuetic given prior GI bleed and now on dual AC

## 2017-12-09 NOTE — PROGRESS NOTE ADULT - PROBLEM SELECTOR PLAN 1
- new right MCA infarct s/p TPA in ED   - neurology workup appreciated  - TTE negative for LV or Atrial thrombus  - given the patients history of a life threatening gi bleed during her hospital stay in [place] and inability to undergo endoscopic evaluation d/t her cardiac comorbidities she would be at a moderate to high risk for rebleeding if anticoagulation were to be restarted  - occult negative (-) and has no overt/occult signs of GI bleeding   - Video Capsule EGD not performed as patient did not want to attempt swallowing due to size of the capsule  - discussed at length with daughter and patient with agreement and understanding that given negative occult and stable h/h role for endoscopic evaluation would not be beneficial and risks of undergoing anesthesia outweigh the benefits  - cont protonix bid while on a/c and carafate  - cont to trend cbc while on a/c  at present remains stable  awaiting rehab placement

## 2017-12-10 LAB
APTT BLD: 64.3 SEC — HIGH (ref 27.5–37.4)
BUN SERPL-MCNC: 8 MG/DL — SIGNIFICANT CHANGE UP (ref 7–23)
CALCIUM SERPL-MCNC: 9 MG/DL — SIGNIFICANT CHANGE UP (ref 8.4–10.5)
CHLORIDE SERPL-SCNC: 110 MMOL/L — HIGH (ref 98–107)
CO2 SERPL-SCNC: 25 MMOL/L — SIGNIFICANT CHANGE UP (ref 22–31)
CREAT SERPL-MCNC: 0.75 MG/DL — SIGNIFICANT CHANGE UP (ref 0.5–1.3)
GLUCOSE SERPL-MCNC: 88 MG/DL — SIGNIFICANT CHANGE UP (ref 70–99)
HCT VFR BLD CALC: 37.9 % — SIGNIFICANT CHANGE UP (ref 34.5–45)
HGB BLD-MCNC: 11.4 G/DL — LOW (ref 11.5–15.5)
INR BLD: 2.28 — HIGH (ref 0.88–1.17)
MCHC RBC-ENTMCNC: 29.1 PG — SIGNIFICANT CHANGE UP (ref 27–34)
MCHC RBC-ENTMCNC: 30.1 % — LOW (ref 32–36)
MCV RBC AUTO: 96.7 FL — SIGNIFICANT CHANGE UP (ref 80–100)
NRBC # FLD: 0 — SIGNIFICANT CHANGE UP
PLATELET # BLD AUTO: 295 K/UL — SIGNIFICANT CHANGE UP (ref 150–400)
PMV BLD: 9.7 FL — SIGNIFICANT CHANGE UP (ref 7–13)
POTASSIUM SERPL-MCNC: 3.9 MMOL/L — SIGNIFICANT CHANGE UP (ref 3.5–5.3)
POTASSIUM SERPL-SCNC: 3.9 MMOL/L — SIGNIFICANT CHANGE UP (ref 3.5–5.3)
PROTHROM AB SERPL-ACNC: 25.7 SEC — HIGH (ref 9.8–13.1)
RBC # BLD: 3.92 M/UL — SIGNIFICANT CHANGE UP (ref 3.8–5.2)
RBC # FLD: 15.4 % — HIGH (ref 10.3–14.5)
SODIUM SERPL-SCNC: 146 MMOL/L — HIGH (ref 135–145)
WBC # BLD: 6.63 K/UL — SIGNIFICANT CHANGE UP (ref 3.8–10.5)
WBC # FLD AUTO: 6.63 K/UL — SIGNIFICANT CHANGE UP (ref 3.8–10.5)

## 2017-12-10 PROCEDURE — 99233 SBSQ HOSP IP/OBS HIGH 50: CPT

## 2017-12-10 RX ORDER — WARFARIN SODIUM 2.5 MG/1
5 TABLET ORAL ONCE
Qty: 0 | Refills: 0 | Status: COMPLETED | OUTPATIENT
Start: 2017-12-10 | End: 2017-12-10

## 2017-12-10 RX ORDER — WARFARIN SODIUM 2.5 MG/1
5 TABLET ORAL ONCE
Qty: 0 | Refills: 0 | Status: DISCONTINUED | OUTPATIENT
Start: 2017-12-10 | End: 2017-12-10

## 2017-12-10 RX ADMIN — Medication 1 DROP(S): at 06:39

## 2017-12-10 RX ADMIN — WARFARIN SODIUM 5 MILLIGRAM(S): 2.5 TABLET ORAL at 18:11

## 2017-12-10 RX ADMIN — PANTOPRAZOLE SODIUM 40 MILLIGRAM(S): 20 TABLET, DELAYED RELEASE ORAL at 18:10

## 2017-12-10 RX ADMIN — ENOXAPARIN SODIUM 70 MILLIGRAM(S): 100 INJECTION SUBCUTANEOUS at 06:39

## 2017-12-10 RX ADMIN — Medication 1 TABLET(S): at 18:11

## 2017-12-10 RX ADMIN — Medication 1 GRAM(S): at 06:38

## 2017-12-10 RX ADMIN — Medication 650 MILLIGRAM(S): at 00:15

## 2017-12-10 RX ADMIN — Medication 1 GRAM(S): at 21:50

## 2017-12-10 RX ADMIN — Medication 1 TABLET(S): at 08:39

## 2017-12-10 RX ADMIN — Medication 650 MILLIGRAM(S): at 22:15

## 2017-12-10 RX ADMIN — ENOXAPARIN SODIUM 70 MILLIGRAM(S): 100 INJECTION SUBCUTANEOUS at 17:09

## 2017-12-10 RX ADMIN — Medication 650 MILLIGRAM(S): at 23:15

## 2017-12-10 RX ADMIN — Medication 1 DROP(S): at 18:11

## 2017-12-10 RX ADMIN — Medication 12.5 MILLIGRAM(S): at 06:39

## 2017-12-10 RX ADMIN — PANTOPRAZOLE SODIUM 40 MILLIGRAM(S): 20 TABLET, DELAYED RELEASE ORAL at 06:39

## 2017-12-10 RX ADMIN — Medication 1 TABLET(S): at 13:36

## 2017-12-10 RX ADMIN — Medication 1 GRAM(S): at 18:10

## 2017-12-10 RX ADMIN — Medication 12.5 MILLIGRAM(S): at 18:10

## 2017-12-10 RX ADMIN — Medication 1 GRAM(S): at 13:35

## 2017-12-10 NOTE — PROGRESS NOTE ADULT - SUBJECTIVE AND OBJECTIVE BOX
INTERVAL HPI/OVERNIGHT EVENTS: Pt seen and examined at bedside, no new changes    MEDICATIONS  (STANDING):  artificial tears (preservative free) Ophthalmic Solution 1 Drop(s) Left EYE two times a day  enoxaparin Injectable 70 milliGRAM(s) SubCutaneous two times a day  lactobacillus acidophilus 1 Tablet(s) Oral three times a day with meals  metoprolol     tartrate 12.5 milliGRAM(s) Oral two times a day  pantoprazole    Tablet 40 milliGRAM(s) Oral two times a day before meals  sucralfate 1 Gram(s) Oral four times a day  warfarin 5 milliGRAM(s) Oral once    MEDICATIONS  (PRN):  acetaminophen   Tablet 650 milliGRAM(s) Oral every 6 hours PRN For Temp greater than 38 C (100.4 F)  acetaminophen   Tablet. 650 milliGRAM(s) Oral every 6 hours PRN Mild Pain (1 - 3)      Allergies    ACE inhibitors (Angioedema)  morphine (Unknown)    Intolerances    caffeine (Stomach Upset)  lactose (Other)      Review of Systems:    General:  No wt loss, fevers, chills, night sweats,fatigue,   Eyes:  Good vision, no reported pain  ENT:  No sore throat, pain, runny nose, dysphagia  CV:  No pain, palpitatioins, hypo/hypertension  Resp:  No dyspnea, cough, tachypnea, wheezing  GI:  No pain, No nausea, No vomiting, No diarrhea, No constipatiion, No weight loss, No fever, No pruritis, No rectal bleeding, No tarry stools, No dysphagia,  :  No pain, bleeding, incontinence, nocturia  Muscle:  No pain, weakness  Neuro:  No weakness, tingling, memory problems  Psych:  No fatigue, insomnia, mood problems, depression  Endocrine:  No polyuria, polydypsia, cold/heat intolerance  Heme:  No petechiae, ecchymosis, easy bruisability  Skin:  No rash, tattoos, scars, edema      Vital Signs Last 24 Hrs  T(C): 36.8 (10 Dec 2017 06:35), Max: 37.3 (09 Dec 2017 23:10)  T(F): 98.3 (10 Dec 2017 06:35), Max: 99.2 (09 Dec 2017 23:10)  HR: 60 (10 Dec 2017 06:35) (60 - 78)  BP: 144/62 (10 Dec 2017 06:35) (144/62 - 164/77)  BP(mean): --  RR: 16 (10 Dec 2017 06:35) (16 - 18)  SpO2: 100% (10 Dec 2017 06:35) (100% - 100%)    PHYSICAL EXAM:    Constitutional: NAD, well-developed  HEENT: EOMI, throat clear  Neck: No LAD, supple  Respiratory: CTA and P  Cardiovascular: S1 and S2, RRR, no M  Gastrointestinal: BS+, soft, NT/ND, neg HSM,  Extremities: No peripheral edema, neg clubing, cyanosis  Vascular: 2+ peripheral pulses  Neurological: A/O x 3, no focal deficits  Psychiatric: Normal mood, normal affect  Skin: No rashes      LABS:                        11.4   6.63  )-----------( 295      ( 10 Dec 2017 07:38 )             37.9     12-10    146<H>  |  110<H>  |  8   ----------------------------<  88  3.9   |  25  |  0.75    Ca    9.0      10 Dec 2017 07:38      PT/INR - ( 10 Dec 2017 07:38 )   PT: 25.7 SEC;   INR: 2.28          PTT - ( 10 Dec 2017 07:38 )  PTT:64.3 SEC      RADIOLOGY & ADDITIONAL TESTS:

## 2017-12-10 NOTE — PROGRESS NOTE ADULT - PROBLEM SELECTOR PLAN 2
- acute right MCA CVA noted on CT scan confirmed on repeat CT head  - s/p tPA and MICU monitoring  - suspicion for cardioembolism (not confirmed) as patient increased risk (high [id number] scre)  - no significant stenosis on carotid ultrasound  - TTE negative for LV or atrial thrombus  - c/w statin   -co-Pay too high for DOAC as per family started on lovenox bridge to coumadin will keep hospitalized until INR therapuetic given prior GI bleed and now on dual AC

## 2017-12-10 NOTE — DOWNTIME INTERRUPTION NOTE - WHICH MANUAL FORMS INITIATED?
SCM eMAR Downtime Report   SC[facility] Patient Summary Downtime Report   Vital Signs & Pain Management Flowsheet

## 2017-12-10 NOTE — PROGRESS NOTE ADULT - PROBLEM SELECTOR PLAN 1
- new right MCA infarct s/p TPA in ED   - neurology workup appreciated  - TTE negative for LV or Atrial thrombus  - given the patients history of a life threatening gi bleed during her hospital stay in [place] and inability to undergo endoscopic evaluation d/t her cardiac comorbidities she would be at a moderate to high risk for rebleeding if anticoagulation were to be restarted  - occult negative (-) and has no overt/occult signs of GI bleeding   - Video Capsule EGD not performed as patient did not want to attempt swallowing due to size of the capsule  - discussed at length with daughter and patient with agreement and understanding that given negative occult and stable h/h role for endoscopic evaluation would not be beneficial and risks of undergoing anesthesia outweigh the benefits  - cont protonix bid while on a/c and carafate  - cont to trend cbc while on a/c  at present remains stable  awaiting rehab placement

## 2017-12-10 NOTE — PROGRESS NOTE ADULT - PROBLEM SELECTOR PLAN 8
Discussed with patient's Daughter on 12/9: concerned with number of blood draws and fact that patient is a difficult stick, arterial attempts done. Discussed option of quality vs quantity of life and palliation but daughter states she would prefer to take the risk with A/C at this time as opposed to recurrent stroke. Does not want a palliative care consult at this time. Will continue A/C with daily blood draws until INR therapeutic.

## 2017-12-10 NOTE — PROGRESS NOTE ADULT - ATTENDING COMMENTS
Dispo: DC lovenox if INR still therapeutic tmw and then rehab. Followup with rheum regarding meds as MTX interacts with coumadin

## 2017-12-10 NOTE — PROGRESS NOTE ADULT - PROBLEM SELECTOR PLAN 1
- dose coumadin 5mg, INR therapeutic today, if INR therapeutic again tmw, DC lovenox  - on metoprolol for rate control as per EP (hold parameters for BP and HR)  - appreciate GI recs, c/w protonix bid and carafate while on A/C  - continue to monitor CBC, so far H/H stable

## 2017-12-10 NOTE — PROGRESS NOTE ADULT - PROBLEM SELECTOR PLAN 3
- being bridged to coumadin, now therapeutic, dosing per primary team   - given the patients history of a life threatening gi bleed during her hospital stay in [place] and inability to undergo endoscopic evaluation d/t her cardiac comorbidities she would be at a moderate to high risk for rebleeding if anticoagulation were to be restarted  - discussed at length with daughter with agreement and understanding that given negative occult and stable h/h role for endoscopic evaluation would not be beneficial and risks of undergoing anesthesia outweigh the benefits  - cont protonix bid while on a/c and carafate bid  - cont to trend cbc while on a/c  being bridged to coumadin with lovenox, counts remain stable, dose coumadin per primary team  INR therapeutic

## 2017-12-11 LAB
APTT BLD: 66.9 SEC — HIGH (ref 27.5–37.4)
BUN SERPL-MCNC: 8 MG/DL — SIGNIFICANT CHANGE UP (ref 7–23)
CALCIUM SERPL-MCNC: 8.7 MG/DL — SIGNIFICANT CHANGE UP (ref 8.4–10.5)
CHLORIDE SERPL-SCNC: 112 MMOL/L — HIGH (ref 98–107)
CO2 SERPL-SCNC: 23 MMOL/L — SIGNIFICANT CHANGE UP (ref 22–31)
CREAT SERPL-MCNC: 0.7 MG/DL — SIGNIFICANT CHANGE UP (ref 0.5–1.3)
GLUCOSE SERPL-MCNC: 86 MG/DL — SIGNIFICANT CHANGE UP (ref 70–99)
HCT VFR BLD CALC: 35.6 % — SIGNIFICANT CHANGE UP (ref 34.5–45)
HGB BLD-MCNC: 10.7 G/DL — LOW (ref 11.5–15.5)
INR BLD: 2.87 — HIGH (ref 0.88–1.17)
MCHC RBC-ENTMCNC: 28.8 PG — SIGNIFICANT CHANGE UP (ref 27–34)
MCHC RBC-ENTMCNC: 30.1 % — LOW (ref 32–36)
MCV RBC AUTO: 96 FL — SIGNIFICANT CHANGE UP (ref 80–100)
NRBC # FLD: 0 — SIGNIFICANT CHANGE UP
PLATELET # BLD AUTO: 272 K/UL — SIGNIFICANT CHANGE UP (ref 150–400)
PMV BLD: 9.9 FL — SIGNIFICANT CHANGE UP (ref 7–13)
POTASSIUM SERPL-MCNC: 3.8 MMOL/L — SIGNIFICANT CHANGE UP (ref 3.5–5.3)
POTASSIUM SERPL-SCNC: 3.8 MMOL/L — SIGNIFICANT CHANGE UP (ref 3.5–5.3)
PROTHROM AB SERPL-ACNC: 32.6 SEC — HIGH (ref 9.8–13.1)
RBC # BLD: 3.71 M/UL — LOW (ref 3.8–5.2)
RBC # FLD: 15.7 % — HIGH (ref 10.3–14.5)
SODIUM SERPL-SCNC: 149 MMOL/L — HIGH (ref 135–145)
WBC # BLD: 6.31 K/UL — SIGNIFICANT CHANGE UP (ref 3.8–10.5)
WBC # FLD AUTO: 6.31 K/UL — SIGNIFICANT CHANGE UP (ref 3.8–10.5)

## 2017-12-11 PROCEDURE — 99233 SBSQ HOSP IP/OBS HIGH 50: CPT

## 2017-12-11 RX ORDER — SODIUM CHLORIDE 9 MG/ML
1000 INJECTION, SOLUTION INTRAVENOUS
Qty: 0 | Refills: 0 | Status: DISCONTINUED | OUTPATIENT
Start: 2017-12-11 | End: 2017-12-12

## 2017-12-11 RX ORDER — WARFARIN SODIUM 2.5 MG/1
4 TABLET ORAL ONCE
Qty: 0 | Refills: 0 | Status: COMPLETED | OUTPATIENT
Start: 2017-12-11 | End: 2017-12-11

## 2017-12-11 RX ADMIN — WARFARIN SODIUM 4 MILLIGRAM(S): 2.5 TABLET ORAL at 18:26

## 2017-12-11 RX ADMIN — Medication 1 GRAM(S): at 23:13

## 2017-12-11 RX ADMIN — Medication 1 GRAM(S): at 06:29

## 2017-12-11 RX ADMIN — Medication 1 DROP(S): at 06:29

## 2017-12-11 RX ADMIN — ENOXAPARIN SODIUM 70 MILLIGRAM(S): 100 INJECTION SUBCUTANEOUS at 06:29

## 2017-12-11 RX ADMIN — Medication 1 GRAM(S): at 18:26

## 2017-12-11 RX ADMIN — SODIUM CHLORIDE 70 MILLILITER(S): 9 INJECTION, SOLUTION INTRAVENOUS at 22:17

## 2017-12-11 RX ADMIN — PANTOPRAZOLE SODIUM 40 MILLIGRAM(S): 20 TABLET, DELAYED RELEASE ORAL at 06:29

## 2017-12-11 RX ADMIN — PANTOPRAZOLE SODIUM 40 MILLIGRAM(S): 20 TABLET, DELAYED RELEASE ORAL at 18:25

## 2017-12-11 RX ADMIN — SODIUM CHLORIDE 70 MILLILITER(S): 9 INJECTION, SOLUTION INTRAVENOUS at 10:12

## 2017-12-11 RX ADMIN — Medication 1 DROP(S): at 18:25

## 2017-12-11 RX ADMIN — Medication 12.5 MILLIGRAM(S): at 18:26

## 2017-12-11 RX ADMIN — Medication 1 TABLET(S): at 09:57

## 2017-12-11 RX ADMIN — Medication 1 TABLET(S): at 18:25

## 2017-12-11 NOTE — PROGRESS NOTE ADULT - PROBLEM SELECTOR PLAN 1
- new right MCA infarct s/p TPA in ED   - neurology workup appreciated  - TTE negative for LV or Atrial thrombus  - given the patients history of a life threatening gi bleed during her hospital stay in [place] and inability to undergo endoscopic evaluation d/t her cardiac comorbidities she would be at a moderate to high risk for rebleeding if anticoagulation were to be restarted  - occult negative (-) and has no overt/occult signs of GI bleeding   - Video Capsule EGD not performed as patient did not want to attempt swallowing due to size of the capsule  - discussed at length with daughter and patient with agreement and understanding that given negative occult and stable h/h role for endoscopic evaluation would not be beneficial and risks of undergoing anesthesia outweigh the benefits  - cont protonix bid while on a/c and carafate  - cont to trend cbc while on a/c  at present remains stable  awaiting rehab placement

## 2017-12-11 NOTE — PROGRESS NOTE ADULT - SUBJECTIVE AND OBJECTIVE BOX
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 Patient lives at home with daughter but has a home health aid that assists with ADLs. Patient went to bathroom at 11:30 and soon after cleaning up, was suddenly unable to lift her left leg/arm. Home health aid also noticed a left sided facial droop and that the patient was unable to speak, although was alert with her eyes open and bobbing her head. The home health aid immediately called daughter for help and patient was brought into ED. At her baseline, she is able to ambulate with a walker, bathe, eat, and perform other ADLs with assistance from the home health aid. Daughter reports that she has been otherwise well recently and has no complaints of recent illness, cough, fever, nausea, vomiting, diarrhea, weakness, no other complaints. Daughter states that patient was admitted to the ICU 1 year ago for a CVA and was put on blood thinners that resulted in her having a massive GI bleed. Blood thinners were discontinued at that time and patient has not been on anticoagulation since. Patient was attending physical therapy from Dec 2016 to February 2017. Daughter also endorses that her mother also had a transient episode of AFib during her last admission but has not been treated for it. (20 Nov 2017 19:30)    MEDICATIONS  (STANDING):  artificial tears (preservative free) Ophthalmic Solution 1 Drop(s) Left EYE two times a day  dextrose 5%. 1000 milliLiter(s) (70 mL/Hr) IV Continuous <Continuous>  lactobacillus acidophilus 1 Tablet(s) Oral three times a day with meals  metoprolol     tartrate 12.5 milliGRAM(s) Oral two times a day  pantoprazole    Tablet 40 milliGRAM(s) Oral two times a day before meals  sucralfate 1 Gram(s) Oral four times a day  warfarin 4 milliGRAM(s) Oral once    MEDICATIONS  (PRN):  acetaminophen   Tablet 650 milliGRAM(s) Oral every 6 hours PRN For Temp greater than 38 C (100.4 F)  acetaminophen   Tablet. 650 milliGRAM(s) Oral every 6 hours PRN Mild Pain (1 - 3)      Allergies    ACE inhibitors (Angioedema)  morphine (Unknown)    Intolerances    caffeine (Stomach Upset)  lactose (Other)        General:  No wt loss, fevers, chills, night sweats, fatigue,   Eyes:  Good vision, no reported pain  ENT:  No sore throat, pain, runny nose, dysphagia, left facial droop  CV:  No pain, palpitations, hypo/hypertension  Resp:  No dyspnea, cough, tachypnea, wheezing  GI:  No pain, No nausea, No vomiting, No diarrhea, No constipation, No weight loss, No fever, No pruritis, No rectal bleeding, No tarry stools, No dysphagia,  :  No pain, bleeding, incontinence, nocturia  Muscle:  No pain, weakness  Neuro:  left sided weakness  Psych:  No fatigue, insomnia, mood problems, depression  Endocrine:  No polyuria, polydipsia, cold/heat intolerance  Heme:  No petechiae, ecchymosis, easy bruisability  Skin:  No rash, tattoos, scars, edema      PHYSICAL EXAM:   Vital Signs:  Vital Signs Last 24 Hrs  T(C): 36.9 (11 Dec 2017 15:28), Max: 36.9 (11 Dec 2017 15:28)  T(F): 98.4 (11 Dec 2017 15:28), Max: 98.4 (11 Dec 2017 15:28)  HR: 78 (11 Dec 2017 15:28) (53 - 78)  BP: 172/91 (11 Dec 2017 15:28) (150/68 - 172/91)  BP(mean): --  RR: 18 (11 Dec 2017 15:28) (16 - 18)  SpO2: 97% (11 Dec 2017 15:28) (97% - 100%)  Daily     Daily I&O's Summary      GENERAL:  Appears stated age, well-groomed, well-nourished, no distress  HEENT:  NC/AT,  conjunctivae clear and pink, no thyromegaly, nodules, adenopathy, no JVD, sclera -anicteric, left facial droop  CHEST:  Full & symmetric excursion, no increased effort, breath sounds clear  HEART:  Regular rhythm, S1, S2, no murmur/rub/S3/S4, no abdominal bruit, no edema  ABDOMEN:  Soft, non-tender, non-distended, normoactive bowel sounds,  no masses ,no hepato-splenomegaly, no signs of chronic liver disease  EXTEREMITIES:  no cyanosis,clubbing or edema,  SKIN:  No rash/erythema/ecchymoses/petechiae/wounds/abscess/warm/dry  NEURO:  lue weakness      LABS:                        10.7   6.31  )-----------( 272      ( 11 Dec 2017 07:00 )             35.6     12-11    149<H>  |  112<H>  |  8   ----------------------------<  86  3.8   |  23  |  0.70    Ca    8.7      11 Dec 2017 07:00      PT/INR - ( 11 Dec 2017 07:00 )   PT: 32.6 SEC;   INR: 2.87          PTT - ( 11 Dec 2017 07:00 )  PTT:66.9 SEC    amylase   lipase  RADIOLOGY & ADDITIONAL TESTS:

## 2017-12-11 NOTE — PROGRESS NOTE ADULT - PROBLEM SELECTOR PLAN 3
- being bridged to coumadin, now therapeutic, dosing per primary team   - given the patients history of a life threatening gi bleed during her hospital stay in [place] and inability to undergo endoscopic evaluation d/t her cardiac comorbidities she would be at a moderate to high risk for rebleeding if anticoagulation were to be restarted  - discussed at length with daughter with agreement and understanding that given negative occult and stable h/h role for endoscopic evaluation would not be beneficial and risks of undergoing anesthesia outweigh the benefits  - cont protonix bid while on a/c and carafate bid  - cont to trend cbc while on a/c  being bridged to coumadin with lovenox, counts remain stable, dose coumadin per primary team  INR therapeutic

## 2017-12-11 NOTE — PROGRESS NOTE ADULT - PROBLEM SELECTOR PLAN 2
- INR therapeutic x 2 days, will d/c Lovenox bridge, c/w Coumadin (will dose 4mg tonight)   - on metoprolol for rate control as per EP (hold parameters for BP and HR)  - appreciate GI recs, c/w protonix bid and carafate while on A/C  - continue to monitor CBC, so far H/H stable

## 2017-12-11 NOTE — PROGRESS NOTE ADULT - SUBJECTIVE AND OBJECTIVE BOX
Patient is a 89y old  Female who presents with a chief complaint of CVA (22 Nov 2017 09:17)        SUBJECTIVE / OVERNIGHT EVENTS:  no acute events o/n  denies complaints  daughter at bedside    MEDICATIONS  (STANDING):  artificial tears (preservative free) Ophthalmic Solution 1 Drop(s) Left EYE two times a day  dextrose 5%. 1000 milliLiter(s) (70 mL/Hr) IV Continuous <Continuous>  enoxaparin Injectable 70 milliGRAM(s) SubCutaneous two times a day  lactobacillus acidophilus 1 Tablet(s) Oral three times a day with meals  metoprolol     tartrate 12.5 milliGRAM(s) Oral two times a day  pantoprazole    Tablet 40 milliGRAM(s) Oral two times a day before meals  sucralfate 1 Gram(s) Oral four times a day    MEDICATIONS  (PRN):  acetaminophen   Tablet 650 milliGRAM(s) Oral every 6 hours PRN For Temp greater than 38 C (100.4 F)  acetaminophen   Tablet. 650 milliGRAM(s) Oral every 6 hours PRN Mild Pain (1 - 3)      Vital Signs Last 24 Hrs  T(C): 36.8 (11 Dec 2017 06:23), Max: 36.9 (10 Dec 2017 15:34)  T(F): 98.2 (11 Dec 2017 06:23), Max: 98.5 (10 Dec 2017 15:34)  HR: 53 (11 Dec 2017 06:23) (53 - 72)  BP: 150/68 (11 Dec 2017 06:23) (150/66 - 168/85)  BP(mean): --  RR: 16 (11 Dec 2017 06:23) (16 - 18)  SpO2: 100% (11 Dec 2017 06:23) (100% - 100%)  CAPILLARY BLOOD GLUCOSE        I&O's Summary      PHYSICAL EXAM:  GENERAL: no apparent distress, on room air  EYES: sclera clear b/l  CHEST/LUNG: Clear to auscultation bilaterally; No wheezing or crackles  HEART: S1/S2  ABDOMEN: Soft, Nontender, Nondistended; Bowel sounds present  EXTREMITIES:  No clubbing, cyanosis, or edema  NEUROLOGY: awake, alert, responds to Qs appropriately        LABS:                        10.7   6.31  )-----------( 272      ( 11 Dec 2017 07:00 )             35.6     12-11    149<H>  |  112<H>  |  8   ----------------------------<  86  3.8   |  23  |  0.70    Ca    8.7      11 Dec 2017 07:00      PT/INR - ( 11 Dec 2017 07:00 )   PT: 32.6 SEC;   INR: 2.87          PTT - ( 11 Dec 2017 07:00 )  PTT:66.9 SEC          RADIOLOGY & ADDITIONAL TESTS:    Imaging Personally Reviewed:  Consultant(s) Notes Reviewed:    Care Discussed with Consultants/Other Providers:

## 2017-12-11 NOTE — PROGRESS NOTE ADULT - PROBLEM SELECTOR PLAN 3
- acute right MCA CVA noted on CT scan confirmed on repeat CT head  - s/p tPA and MICU monitoring  - suspicion for cardioembolism (not confirmed) as patient increased risk (high [id number] score)  - no significant stenosis on carotid ultrasound  - TTE negative for LV or atrial thrombus  - c/w statin   -co-Pay too high for [facility] as per family started on lovenox bridge to coumadin will keep hospitalized until INR therapeutic given prior GI bleed and now on dual AC

## 2017-12-12 LAB
BUN SERPL-MCNC: 8 MG/DL — SIGNIFICANT CHANGE UP (ref 7–23)
CALCIUM SERPL-MCNC: 8.6 MG/DL — SIGNIFICANT CHANGE UP (ref 8.4–10.5)
CHLORIDE SERPL-SCNC: 106 MMOL/L — SIGNIFICANT CHANGE UP (ref 98–107)
CO2 SERPL-SCNC: 20 MMOL/L — LOW (ref 22–31)
CREAT SERPL-MCNC: 0.8 MG/DL — SIGNIFICANT CHANGE UP (ref 0.5–1.3)
GLUCOSE SERPL-MCNC: 118 MG/DL — HIGH (ref 70–99)
HCT VFR BLD CALC: 36.3 % — SIGNIFICANT CHANGE UP (ref 34.5–45)
HGB BLD-MCNC: 11.4 G/DL — LOW (ref 11.5–15.5)
INR BLD: 3.27 — HIGH (ref 0.88–1.17)
MAGNESIUM SERPL-MCNC: 1.5 MG/DL — LOW (ref 1.6–2.6)
MCHC RBC-ENTMCNC: 30 PG — SIGNIFICANT CHANGE UP (ref 27–34)
MCHC RBC-ENTMCNC: 31.4 % — LOW (ref 32–36)
MCV RBC AUTO: 95.5 FL — SIGNIFICANT CHANGE UP (ref 80–100)
NRBC # FLD: 0 — SIGNIFICANT CHANGE UP
PLATELET # BLD AUTO: 260 K/UL — SIGNIFICANT CHANGE UP (ref 150–400)
PMV BLD: 10.1 FL — SIGNIFICANT CHANGE UP (ref 7–13)
POTASSIUM SERPL-MCNC: 3.7 MMOL/L — SIGNIFICANT CHANGE UP (ref 3.5–5.3)
POTASSIUM SERPL-SCNC: 3.7 MMOL/L — SIGNIFICANT CHANGE UP (ref 3.5–5.3)
PROTHROM AB SERPL-ACNC: 37.2 SEC — HIGH (ref 9.8–13.1)
RBC # BLD: 3.8 M/UL — SIGNIFICANT CHANGE UP (ref 3.8–5.2)
RBC # FLD: 15.6 % — HIGH (ref 10.3–14.5)
SODIUM SERPL-SCNC: 142 MMOL/L — SIGNIFICANT CHANGE UP (ref 135–145)
WBC # BLD: 8.23 K/UL — SIGNIFICANT CHANGE UP (ref 3.8–10.5)
WBC # FLD AUTO: 8.23 K/UL — SIGNIFICANT CHANGE UP (ref 3.8–10.5)

## 2017-12-12 PROCEDURE — 99233 SBSQ HOSP IP/OBS HIGH 50: CPT

## 2017-12-12 RX ORDER — WARFARIN SODIUM 2.5 MG/1
1 TABLET ORAL ONCE
Qty: 0 | Refills: 0 | Status: COMPLETED | OUTPATIENT
Start: 2017-12-12 | End: 2017-12-12

## 2017-12-12 RX ORDER — METOPROLOL TARTRATE 50 MG
2.5 TABLET ORAL ONCE
Qty: 0 | Refills: 0 | Status: DISCONTINUED | OUTPATIENT
Start: 2017-12-12 | End: 2017-12-12

## 2017-12-12 RX ORDER — METOPROLOL TARTRATE 50 MG
25 TABLET ORAL
Qty: 0 | Refills: 0 | Status: DISCONTINUED | OUTPATIENT
Start: 2017-12-12 | End: 2017-12-13

## 2017-12-12 RX ORDER — METOPROLOL TARTRATE 50 MG
12.5 TABLET ORAL ONCE
Qty: 0 | Refills: 0 | Status: COMPLETED | OUTPATIENT
Start: 2017-12-12 | End: 2017-12-12

## 2017-12-12 RX ORDER — MAGNESIUM SULFATE 500 MG/ML
2 VIAL (ML) INJECTION ONCE
Qty: 0 | Refills: 0 | Status: COMPLETED | OUTPATIENT
Start: 2017-12-12 | End: 2017-12-12

## 2017-12-12 RX ADMIN — Medication 1 GRAM(S): at 23:30

## 2017-12-12 RX ADMIN — Medication 1 TABLET(S): at 11:04

## 2017-12-12 RX ADMIN — Medication 12.5 MILLIGRAM(S): at 10:58

## 2017-12-12 RX ADMIN — PANTOPRAZOLE SODIUM 40 MILLIGRAM(S): 20 TABLET, DELAYED RELEASE ORAL at 05:39

## 2017-12-12 RX ADMIN — WARFARIN SODIUM 1 MILLIGRAM(S): 2.5 TABLET ORAL at 17:11

## 2017-12-12 RX ADMIN — Medication 50 GRAM(S): at 10:57

## 2017-12-12 RX ADMIN — Medication 1 DROP(S): at 05:39

## 2017-12-12 RX ADMIN — Medication 1 TABLET(S): at 08:30

## 2017-12-12 RX ADMIN — Medication 1 TABLET(S): at 17:12

## 2017-12-12 RX ADMIN — Medication 1 GRAM(S): at 17:12

## 2017-12-12 RX ADMIN — Medication 1 GRAM(S): at 05:39

## 2017-12-12 RX ADMIN — PANTOPRAZOLE SODIUM 40 MILLIGRAM(S): 20 TABLET, DELAYED RELEASE ORAL at 17:11

## 2017-12-12 RX ADMIN — Medication 12.5 MILLIGRAM(S): at 05:39

## 2017-12-12 RX ADMIN — Medication 1 DROP(S): at 17:12

## 2017-12-12 RX ADMIN — Medication 25 MILLIGRAM(S): at 17:12

## 2017-12-12 RX ADMIN — Medication 1 GRAM(S): at 11:00

## 2017-12-12 NOTE — PROGRESS NOTE ADULT - PROBLEM SELECTOR PLAN 1
- new right MCA infarct s/p TPA in ED   - neurology workup appreciated  - TTE negative for LV or Atrial thrombus  - given the patients history of a life threatening gi bleed during her hospital stay in [place] and inability to undergo endoscopic evaluation d/t her cardiac comorbidities she would be at a moderate to high risk for rebleeding if anticoagulation were to be restarted  - occult negative (-) and has no overt/occult signs of GI bleeding   - Video Capsule EGD not performed as patient did not want to attempt swallowing due to size of the capsule  - discussed at length with daughter and patient with agreement and understanding that given negative occult and stable h/h role for endoscopic evaluation would not be beneficial and risks of undergoing anesthesia outweigh the benefits  - cont protonix bid while on a/c and carafate  - cont to trend cbc while on a/c  at present remains stable  awaiting rehab placement

## 2017-12-12 NOTE — PROGRESS NOTE ADULT - PROBLEM SELECTOR PLAN 1
- INR supratherapeutic today, will decrease dose of Coumadin tonight   - on metoprolol for rate control as per EP   - increase to metoprolol 25 po BID for improved rate control  - appreciate GI recs, c/w protonix bid and carafate while on A/C  - continue to monitor CBC, so far H/H stable

## 2017-12-12 NOTE — PROGRESS NOTE ADULT - ASSESSMENT
89 yo F prior hx CVA, PUD, prior GIB, OA/RA, p/w left sided weakness, s/p tPA, found to have a right MCA infarct in imaging. Course c/b fever (2/2 aspiration PNA vs UTI), (CXR concerning for possible PNA, Ucx growing >100K Klebsiella), s/p Zosyn.

## 2017-12-12 NOTE — PROGRESS NOTE ADULT - PROBLEM SELECTOR PLAN 3
- acute right MCA CVA noted on CT scan confirmed on repeat CT head  - s/p tPA and MICU monitoring  - suspicion for cardioembolism (not confirmed) as patient increased risk (high [id number] score)  - no significant stenosis on carotid ultrasound  - TTE negative for LV or atrial thrombus  - c/w statin   - on Coumadin

## 2017-12-12 NOTE — PROGRESS NOTE ADULT - PROBLEM SELECTOR PLAN 3
- being bridged to coumadin, now therapeutic, dosing per primary team   - given the patients history of a life threatening gi bleed during her hospital stay in [place] and inability to undergo endoscopic evaluation d/t her cardiac comorbidities she would be at a moderate to high risk for rebleeding if anticoagulation were to be restarted  - discussed at length with daughter with agreement and understanding that given negative occult and stable h/h role for endoscopic evaluation would not be beneficial and risks of undergoing anesthesia outweigh the benefits  - cont protonix bid while on a/c and carafate bid  - cont to trend cbc while on a/c  being bridged to coumadin with lovenox, counts remain stable, dose coumadin per primary team

## 2017-12-12 NOTE — PROGRESS NOTE ADULT - SUBJECTIVE AND OBJECTIVE BOX
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 Patient lives at home with daughter but has a home health aid that assists with ADLs. Patient went to bathroom at 11:30 and soon after cleaning up, was suddenly unable to lift her left leg/arm. Home health aid also noticed a left sided facial droop and that the patient was unable to speak, although was alert with her eyes open and bobbing her head. The home health aid immediately called daughter for help and patient was brought into ED. At her baseline, she is able to ambulate with a walker, bathe, eat, and perform other ADLs with assistance from the home health aid. Daughter reports that she has been otherwise well recently and has no complaints of recent illness, cough, fever, nausea, vomiting, diarrhea, weakness, no other complaints. Daughter states that patient was admitted to the ICU 1 year ago for a CVA and was put on blood thinners that resulted in her having a massive GI bleed. Blood thinners were discontinued at that time and patient has not been on anticoagulation since. Patient was attending physical therapy from Dec 2016 to February 2017. Daughter also endorses that her mother also had a transient episode of AFib during her last admission but has not been treated for it. (20 Nov 2017 19:30)    MEDICATIONS  (STANDING):  artificial tears (preservative free) Ophthalmic Solution 1 Drop(s) Left EYE two times a day  lactobacillus acidophilus 1 Tablet(s) Oral three times a day with meals  metoprolol     tartrate 25 milliGRAM(s) Oral two times a day  pantoprazole    Tablet 40 milliGRAM(s) Oral two times a day before meals  sucralfate 1 Gram(s) Oral four times a day  warfarin 1 milliGRAM(s) Oral once    MEDICATIONS  (PRN):  acetaminophen   Tablet 650 milliGRAM(s) Oral every 6 hours PRN For Temp greater than 38 C (100.4 F)  acetaminophen   Tablet. 650 milliGRAM(s) Oral every 6 hours PRN Mild Pain (1 - 3)      Allergies    ACE inhibitors (Angioedema)  morphine (Unknown)    Intolerances    caffeine (Stomach Upset)  lactose (Other)        General:  No wt loss, fevers, chills, night sweats, fatigue,   Eyes:  Good vision, no reported pain  ENT:  No sore throat, pain, runny nose, dysphagia  CV:  No pain, palpitations, hypo/hypertension  Resp:  No dyspnea, cough, tachypnea, wheezing  GI:  No pain, No nausea, No vomiting, No diarrhea, No constipation, No weight loss, No fever, No pruritis, No rectal bleeding, No tarry stools, No dysphagia,  :  No pain, bleeding, incontinence, nocturia  Muscle:  No pain, weakness  Neuro:  No weakness, tingling, memory problems  Psych:  No fatigue, insomnia, mood problems, depression  Endocrine:  No polyuria, polydipsia, cold/heat intolerance  Heme:  No petechiae, ecchymosis, easy bruisability  Skin:  No rash, tattoos, scars, edema      PHYSICAL EXAM:   Vital Signs:  Vital Signs Last 24 Hrs  T(C): 36.8 (12 Dec 2017 13:53), Max: 37.2 (11 Dec 2017 22:54)  T(F): 98.2 (12 Dec 2017 13:53), Max: 99 (11 Dec 2017 22:54)  HR: 110 (12 Dec 2017 13:53) (64 - 130)  BP: 136/80 (12 Dec 2017 13:53) (116/84 - 173/87)  BP(mean): --  RR: 18 (12 Dec 2017 13:53) (16 - 19)  SpO2: 98% (12 Dec 2017 13:53) (97% - 99%)  Daily     Daily I&O's Summary      GENERAL:  Appears stated age, well-groomed, well-nourished, no distress  HEENT:  NC/AT,  conjunctivae clear and pink, no thyromegaly, nodules, adenopathy, no JVD, sclera -anicteric, left facial droop  CHEST:  Full & symmetric excursion, no increased effort, breath sounds clear  HEART:  Regular rhythm, S1, S2, no murmur/rub/S3/S4, no abdominal bruit, no edema  ABDOMEN:  Soft, non-tender, non-distended, normoactive bowel sounds,  no masses ,no hepato-splenomegaly, no signs of chronic liver disease  EXTEREMITIES:  no cyanosis,clubbing or edema  SKIN:  No rash/erythema/ecchymoses/petechiae/wounds/abscess/warm/dry  NEURO:  left sided weakness      LABS:                        11.4   8.23  )-----------( 260      ( 12 Dec 2017 06:50 )             36.3     12-12    142  |  106  |  8   ----------------------------<  118<H>  3.7   |  20<L>  |  0.80    Ca    8.6      12 Dec 2017 06:50  Mg     1.5     12-12      PT/INR - ( 12 Dec 2017 06:50 )   PT: 37.2 SEC;   INR: 3.27          PTT - ( 11 Dec 2017 07:00 )  PTT:66.9 SEC    amylase   lipase  RADIOLOGY & ADDITIONAL TESTS:

## 2017-12-12 NOTE — PROGRESS NOTE ADULT - SUBJECTIVE AND OBJECTIVE BOX
Patient is a 89y old  Female who presents with a chief complaint of CVA (22 Nov 2017 09:17)        SUBJECTIVE / OVERNIGHT EVENTS:  pt converted to Afib around 5am, rate 120s. PO metoprolol given.  Pt asymtomatic, denies complaints.     MEDICATIONS  (STANDING):  artificial tears (preservative free) Ophthalmic Solution 1 Drop(s) Left EYE two times a day  lactobacillus acidophilus 1 Tablet(s) Oral three times a day with meals  metoprolol     tartrate 25 milliGRAM(s) Oral two times a day  pantoprazole    Tablet 40 milliGRAM(s) Oral two times a day before meals  sucralfate 1 Gram(s) Oral four times a day  warfarin 1 milliGRAM(s) Oral once    MEDICATIONS  (PRN):  acetaminophen   Tablet 650 milliGRAM(s) Oral every 6 hours PRN For Temp greater than 38 C (100.4 F)  acetaminophen   Tablet. 650 milliGRAM(s) Oral every 6 hours PRN Mild Pain (1 - 3)      Vital Signs Last 24 Hrs  T(C): 36.8 (12 Dec 2017 13:53), Max: 37.2 (11 Dec 2017 22:54)  T(F): 98.2 (12 Dec 2017 13:53), Max: 99 (11 Dec 2017 22:54)  HR: 110 (12 Dec 2017 13:53) (64 - 130)  BP: 136/80 (12 Dec 2017 13:53) (116/84 - 173/87)  BP(mean): --  RR: 18 (12 Dec 2017 13:53) (16 - 19)  SpO2: 98% (12 Dec 2017 13:53) (97% - 99%)  CAPILLARY BLOOD GLUCOSE        I&O's Summary        PHYSICAL EXAM:  GENERAL: no apparent distress, on room air  CHEST/LUNG: Clear to auscultation bilaterally; No wheezing or crackles  HEART: S1/S2  ABDOMEN: Soft, Nontender, Nondistended; Bowel sounds present  EXTREMITIES:  No clubbing, cyanosis, or edema  NEUROLOGY: awake, alert, responds to Qs appropriately    LABS:                        11.4   8.23  )-----------( 260      ( 12 Dec 2017 06:50 )             36.3     12-12    142  |  106  |  8   ----------------------------<  118<H>  3.7   |  20<L>  |  0.80    Ca    8.6      12 Dec 2017 06:50  Mg     1.5     12-12      PT/INR - ( 12 Dec 2017 06:50 )   PT: 37.2 SEC;   INR: 3.27          PTT - ( 11 Dec 2017 07:00 )  PTT:66.9 SEC          RADIOLOGY & ADDITIONAL TESTS:    Imaging Personally Reviewed:  Consultant(s) Notes Reviewed:    Care Discussed with Consultants/Other Providers:

## 2017-12-13 LAB
BUN SERPL-MCNC: 10 MG/DL — SIGNIFICANT CHANGE UP (ref 7–23)
CALCIUM SERPL-MCNC: 8.6 MG/DL — SIGNIFICANT CHANGE UP (ref 8.4–10.5)
CHLORIDE SERPL-SCNC: 110 MMOL/L — HIGH (ref 98–107)
CO2 SERPL-SCNC: 25 MMOL/L — SIGNIFICANT CHANGE UP (ref 22–31)
CREAT SERPL-MCNC: 0.88 MG/DL — SIGNIFICANT CHANGE UP (ref 0.5–1.3)
GLUCOSE SERPL-MCNC: 113 MG/DL — HIGH (ref 70–99)
HCT VFR BLD CALC: 37.1 % — SIGNIFICANT CHANGE UP (ref 34.5–45)
HGB BLD-MCNC: 11.4 G/DL — LOW (ref 11.5–15.5)
INR BLD: 2.98 — HIGH (ref 0.88–1.17)
MAGNESIUM SERPL-MCNC: 2 MG/DL — SIGNIFICANT CHANGE UP (ref 1.6–2.6)
MCHC RBC-ENTMCNC: 29.2 PG — SIGNIFICANT CHANGE UP (ref 27–34)
MCHC RBC-ENTMCNC: 30.7 % — LOW (ref 32–36)
MCV RBC AUTO: 95.1 FL — SIGNIFICANT CHANGE UP (ref 80–100)
NRBC # FLD: 0 — SIGNIFICANT CHANGE UP
PLATELET # BLD AUTO: 283 K/UL — SIGNIFICANT CHANGE UP (ref 150–400)
PMV BLD: 9.8 FL — SIGNIFICANT CHANGE UP (ref 7–13)
POTASSIUM SERPL-MCNC: 3.6 MMOL/L — SIGNIFICANT CHANGE UP (ref 3.5–5.3)
POTASSIUM SERPL-SCNC: 3.6 MMOL/L — SIGNIFICANT CHANGE UP (ref 3.5–5.3)
PROTHROM AB SERPL-ACNC: 35.1 SEC — HIGH (ref 9.8–13.1)
RBC # BLD: 3.9 M/UL — SIGNIFICANT CHANGE UP (ref 3.8–5.2)
RBC # FLD: 15.8 % — HIGH (ref 10.3–14.5)
SODIUM SERPL-SCNC: 148 MMOL/L — HIGH (ref 135–145)
WBC # BLD: 6.89 K/UL — SIGNIFICANT CHANGE UP (ref 3.8–10.5)
WBC # FLD AUTO: 6.89 K/UL — SIGNIFICANT CHANGE UP (ref 3.8–10.5)

## 2017-12-13 PROCEDURE — 99233 SBSQ HOSP IP/OBS HIGH 50: CPT

## 2017-12-13 RX ORDER — METOPROLOL TARTRATE 50 MG
25 TABLET ORAL ONCE
Qty: 0 | Refills: 0 | Status: COMPLETED | OUTPATIENT
Start: 2017-12-13 | End: 2017-12-13

## 2017-12-13 RX ORDER — SODIUM CHLORIDE 9 MG/ML
1000 INJECTION, SOLUTION INTRAVENOUS
Qty: 0 | Refills: 0 | Status: DISCONTINUED | OUTPATIENT
Start: 2017-12-13 | End: 2017-12-14

## 2017-12-13 RX ORDER — SODIUM CHLORIDE 9 MG/ML
1000 INJECTION INTRAMUSCULAR; INTRAVENOUS; SUBCUTANEOUS
Qty: 0 | Refills: 0 | Status: DISCONTINUED | OUTPATIENT
Start: 2017-12-13 | End: 2017-12-13

## 2017-12-13 RX ORDER — WARFARIN SODIUM 2.5 MG/1
1 TABLET ORAL ONCE
Qty: 0 | Refills: 0 | Status: COMPLETED | OUTPATIENT
Start: 2017-12-13 | End: 2017-12-13

## 2017-12-13 RX ORDER — METOPROLOL TARTRATE 50 MG
50 TABLET ORAL
Qty: 0 | Refills: 0 | Status: DISCONTINUED | OUTPATIENT
Start: 2017-12-13 | End: 2017-12-18

## 2017-12-13 RX ADMIN — Medication 1 TABLET(S): at 17:17

## 2017-12-13 RX ADMIN — WARFARIN SODIUM 1 MILLIGRAM(S): 2.5 TABLET ORAL at 17:17

## 2017-12-13 RX ADMIN — Medication 1 GRAM(S): at 06:25

## 2017-12-13 RX ADMIN — Medication 1 GRAM(S): at 12:53

## 2017-12-13 RX ADMIN — Medication 1 DROP(S): at 06:25

## 2017-12-13 RX ADMIN — Medication 650 MILLIGRAM(S): at 23:23

## 2017-12-13 RX ADMIN — Medication 25 MILLIGRAM(S): at 18:25

## 2017-12-13 RX ADMIN — Medication 1 TABLET(S): at 12:53

## 2017-12-13 RX ADMIN — Medication 1 DROP(S): at 17:18

## 2017-12-13 RX ADMIN — PANTOPRAZOLE SODIUM 40 MILLIGRAM(S): 20 TABLET, DELAYED RELEASE ORAL at 06:24

## 2017-12-13 RX ADMIN — Medication 650 MILLIGRAM(S): at 22:29

## 2017-12-13 RX ADMIN — Medication 25 MILLIGRAM(S): at 06:25

## 2017-12-13 RX ADMIN — Medication 1 GRAM(S): at 17:19

## 2017-12-13 RX ADMIN — Medication 1 GRAM(S): at 23:02

## 2017-12-13 RX ADMIN — PANTOPRAZOLE SODIUM 40 MILLIGRAM(S): 20 TABLET, DELAYED RELEASE ORAL at 17:17

## 2017-12-13 NOTE — PROGRESS NOTE ADULT - PROBLEM SELECTOR PLAN 3
- being bridged to coumadin, now therapeutic, dosing per primary team   - given the patients history of a life threatening gi bleed during her hospital stay in [place] and inability to undergo endoscopic evaluation d/t her cardiac comorbidities she would be at a moderate to high risk for rebleeding if anticoagulation were to be restarted  - discussed at length with daughter with agreement and understanding that given negative occult and stable h/h role for endoscopic evaluation would not be beneficial and risks of undergoing anesthesia outweigh the benefits  - cont protonix bid while on a/c and carafate bid  - cont to trend cbc while on a/c  being bridged to coumadin with lovenox, counts remain stable, dose coumadin per primary team

## 2017-12-13 NOTE — PROGRESS NOTE ADULT - PROBLEM SELECTOR PLAN 1
- new right MCA infarct s/p TPA in ED   - neurology workup appreciated  - TTE negative for LV or Atrial thrombus  - given the patients history of a life threatening gi bleed during her hospital stay in [place] and inability to undergo endoscopic evaluation d/t her cardiac comorbidities she would be at a moderate to high risk for rebleeding if anticoagulation were to be restarted  - occult negative (-) and has no overt/occult signs of GI bleeding   - Video Capsule EGD not performed as patient did not want to attempt swallowing due to size of the capsule  - discussed at length with daughter and patient with agreement and understanding that given negative occult and stable h/h role for endoscopic evaluation would not be beneficial and risks of undergoing anesthesia outweigh the benefits  - cont protonix bid while on a/c and carafate; discussed with daughter at bedside  - cont to trend cbc while on a/c  at present remains stable  awaiting rehab placement

## 2017-12-13 NOTE — PROGRESS NOTE ADULT - PROBLEM SELECTOR PLAN 1
- INR 2.98 today, will dose Coumadin 1mg PO tonight  - aim to keep INR closer to 2 given h/o massive GIB  - rate controlled on metoprolol 25 bid   - will ask EP for f/u given VT on tele despite increased metoprolol  - appreciate GI recs, c/w protonix bid and carafate while on A/C  - continue to monitor CBC, so far H/H stable

## 2017-12-13 NOTE — CHART NOTE - NSCHARTNOTEFT_GEN_A_CORE
Noted PAF/PAT with RVR up to 120-140 bpm this am to 3pm.  Patient minimally symptomatic.  Recommend uptitrate Metoprolol to 50 mg BID as tolerated.  Continue AC with Coumadin for INR 2-3.

## 2017-12-13 NOTE — PROGRESS NOTE ADULT - SUBJECTIVE AND OBJECTIVE BOX
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 Patient lives at home with daughter but has a home health aid that assists with ADLs. Patient went to bathroom at 11:30 and soon after cleaning up, was suddenly unable to lift her left leg/arm. Home health aid also noticed a left sided facial droop and that the patient was unable to speak, although was alert with her eyes open and bobbing her head. The home health aid immediately called daughter for help and patient was brought into ED. At her baseline, she is able to ambulate with a walker, bathe, eat, and perform other ADLs with assistance from the home health aid. Daughter reports that she has been otherwise well recently and has no complaints of recent illness, cough, fever, nausea, vomiting, diarrhea, weakness, no other complaints. Daughter states that patient was admitted to the ICU 1 year ago for a CVA and was put on blood thinners that resulted in her having a massive GI bleed. Blood thinners were discontinued at that time and patient has not been on anticoagulation since. Patient was attending physical therapy from Dec 2016 to February 2017. Daughter also endorses that her mother also had a transient episode of AFib during her last admission but has not been treated for it. (20 Nov 2017 19:30)    MEDICATIONS  (STANDING):  artificial tears (preservative free) Ophthalmic Solution 1 Drop(s) Left EYE two times a day  lactobacillus acidophilus 1 Tablet(s) Oral three times a day with meals  metoprolol     tartrate 25 milliGRAM(s) Oral two times a day  pantoprazole    Tablet 40 milliGRAM(s) Oral two times a day before meals  sucralfate 1 Gram(s) Oral four times a day    MEDICATIONS  (PRN):  acetaminophen   Tablet 650 milliGRAM(s) Oral every 6 hours PRN For Temp greater than 38 C (100.4 F)  acetaminophen   Tablet. 650 milliGRAM(s) Oral every 6 hours PRN Mild Pain (1 - 3)      Allergies    ACE inhibitors (Angioedema)  morphine (Unknown)    Intolerances    caffeine (Stomach Upset)  lactose (Other)        General:  No wt loss, fevers, chills, night sweats, fatigue,   Eyes:  Good vision, no reported pain  ENT:  No sore throat, pain, runny nose, dysphagia  CV:  No pain, palpitations, hypo/hypertension  Resp:  No dyspnea, cough, tachypnea, wheezing  GI:  No pain, No nausea, No vomiting, No diarrhea, No constipation, No weight loss, No fever, No pruritis, No rectal bleeding, No tarry stools, No dysphagia,  :  No pain, bleeding, incontinence, nocturia  Muscle:  No pain, weakness  Neuro:  No weakness, tingling, memory problems  Psych:  No fatigue, insomnia, mood problems, depression  Endocrine:  No polyuria, polydipsia, cold/heat intolerance  Heme:  No petechiae, ecchymosis, easy bruisability  Skin:  No rash, tattoos, scars, edema      PHYSICAL EXAM:   Vital Signs:  Vital Signs Last 24 Hrs  T(C): 36.4 (13 Dec 2017 06:20), Max: 36.9 (12 Dec 2017 23:30)  T(F): 97.6 (13 Dec 2017 06:20), Max: 98.4 (12 Dec 2017 23:30)  HR: 59 (13 Dec 2017 06:20) (59 - 93)  BP: 132/63 (13 Dec 2017 06:20) (132/63 - 151/70)  BP(mean): --  RR: 17 (13 Dec 2017 06:20) (17 - 17)  SpO2: 98% (13 Dec 2017 06:20) (98% - 99%)  Daily     Daily I&O's Summary      GENERAL:  Appears stated age, well-groomed, well-nourished, no distress  HEENT:  NC/AT,  conjunctivae clear and pink, no thyromegaly, nodules, adenopathy, no JVD, sclera -anicteric, left facial droop  CHEST:  Full & symmetric excursion, no increased effort, breath sounds clear  HEART:  Regular rhythm, S1, S2, no murmur/rub/S3/S4, no abdominal bruit, no edema  ABDOMEN:  Soft, non-tender, non-distended, normoactive bowel sounds,  no masses ,no hepato-splenomegaly, no signs of chronic liver disease  EXTEREMITIES:  no cyanosis,clubbing or edema  SKIN:  No rash/erythema/ecchymoses/petechiae/wounds/abscess/warm/dry  NEURO:  left side weakness      LABS:                        11.4   6.89  )-----------( 283      ( 13 Dec 2017 12:40 )             37.1     12-12    142  |  106  |  8   ----------------------------<  118<H>  3.7   |  20<L>  |  0.80    Ca    8.6      12 Dec 2017 06:50  Mg     1.5     12-12      PT/INR - ( 13 Dec 2017 12:40 )   PT: 35.1 SEC;   INR: 2.98              amylase   lipase  RADIOLOGY & ADDITIONAL TESTS:

## 2017-12-13 NOTE — PROGRESS NOTE ADULT - PROBLEM SELECTOR PLAN 3
- acute right MCA CVA noted on CT scan confirmed on repeat CT head  - s/p tPA and MICU monitoring  - suspicion for cardioembolism (not confirmed) as patient increased risk (high [id number] score)  - no significant stenosis on carotid ultrasound  - TTE negative for LV or atrial thrombus  - c/w statin   - on Coumadin

## 2017-12-14 LAB
BASOPHILS # BLD AUTO: 0.04 K/UL — SIGNIFICANT CHANGE UP (ref 0–0.2)
BASOPHILS NFR BLD AUTO: 0.6 % — SIGNIFICANT CHANGE UP (ref 0–2)
BUN SERPL-MCNC: 11 MG/DL — SIGNIFICANT CHANGE UP (ref 7–23)
CALCIUM SERPL-MCNC: 9.2 MG/DL — SIGNIFICANT CHANGE UP (ref 8.4–10.5)
CHLORIDE SERPL-SCNC: 106 MMOL/L — SIGNIFICANT CHANGE UP (ref 98–107)
CO2 SERPL-SCNC: 22 MMOL/L — SIGNIFICANT CHANGE UP (ref 22–31)
CREAT SERPL-MCNC: 0.81 MG/DL — SIGNIFICANT CHANGE UP (ref 0.5–1.3)
EOSINOPHIL # BLD AUTO: 0.07 K/UL — SIGNIFICANT CHANGE UP (ref 0–0.5)
EOSINOPHIL NFR BLD AUTO: 1 % — SIGNIFICANT CHANGE UP (ref 0–6)
GLUCOSE SERPL-MCNC: 110 MG/DL — HIGH (ref 70–99)
HCT VFR BLD CALC: 37.5 % — SIGNIFICANT CHANGE UP (ref 34.5–45)
HGB BLD-MCNC: 11.7 G/DL — SIGNIFICANT CHANGE UP (ref 11.5–15.5)
IMM GRANULOCYTES # BLD AUTO: 0.04 # — SIGNIFICANT CHANGE UP
IMM GRANULOCYTES NFR BLD AUTO: 0.6 % — SIGNIFICANT CHANGE UP (ref 0–1.5)
INR BLD: 3.5 — HIGH (ref 0.88–1.17)
LYMPHOCYTES # BLD AUTO: 2 K/UL — SIGNIFICANT CHANGE UP (ref 1–3.3)
LYMPHOCYTES # BLD AUTO: 28 % — SIGNIFICANT CHANGE UP (ref 13–44)
MAGNESIUM SERPL-MCNC: 1.7 MG/DL — SIGNIFICANT CHANGE UP (ref 1.6–2.6)
MCHC RBC-ENTMCNC: 29.3 PG — SIGNIFICANT CHANGE UP (ref 27–34)
MCHC RBC-ENTMCNC: 31.2 % — LOW (ref 32–36)
MCV RBC AUTO: 93.8 FL — SIGNIFICANT CHANGE UP (ref 80–100)
MONOCYTES # BLD AUTO: 0.73 K/UL — SIGNIFICANT CHANGE UP (ref 0–0.9)
MONOCYTES NFR BLD AUTO: 10.2 % — SIGNIFICANT CHANGE UP (ref 2–14)
NEUTROPHILS # BLD AUTO: 4.27 K/UL — SIGNIFICANT CHANGE UP (ref 1.8–7.4)
NEUTROPHILS NFR BLD AUTO: 59.6 % — SIGNIFICANT CHANGE UP (ref 43–77)
NRBC # FLD: 0 — SIGNIFICANT CHANGE UP
PLATELET # BLD AUTO: 273 K/UL — SIGNIFICANT CHANGE UP (ref 150–400)
PMV BLD: 9.8 FL — SIGNIFICANT CHANGE UP (ref 7–13)
POTASSIUM SERPL-MCNC: 3.9 MMOL/L — SIGNIFICANT CHANGE UP (ref 3.5–5.3)
POTASSIUM SERPL-SCNC: 3.9 MMOL/L — SIGNIFICANT CHANGE UP (ref 3.5–5.3)
PROTHROM AB SERPL-ACNC: 39.9 SEC — HIGH (ref 9.8–13.1)
RBC # BLD: 4 M/UL — SIGNIFICANT CHANGE UP (ref 3.8–5.2)
RBC # FLD: 15.7 % — HIGH (ref 10.3–14.5)
SODIUM SERPL-SCNC: 142 MMOL/L — SIGNIFICANT CHANGE UP (ref 135–145)
WBC # BLD: 7.15 K/UL — SIGNIFICANT CHANGE UP (ref 3.8–10.5)
WBC # FLD AUTO: 7.15 K/UL — SIGNIFICANT CHANGE UP (ref 3.8–10.5)

## 2017-12-14 PROCEDURE — 70450 CT HEAD/BRAIN W/O DYE: CPT | Mod: 26

## 2017-12-14 PROCEDURE — 99233 SBSQ HOSP IP/OBS HIGH 50: CPT

## 2017-12-14 RX ADMIN — Medication 1 TABLET(S): at 11:14

## 2017-12-14 RX ADMIN — Medication 50 MILLIGRAM(S): at 06:28

## 2017-12-14 RX ADMIN — Medication 1 GRAM(S): at 06:28

## 2017-12-14 RX ADMIN — Medication 1 GRAM(S): at 11:14

## 2017-12-14 RX ADMIN — Medication 1 TABLET(S): at 17:13

## 2017-12-14 RX ADMIN — Medication 1 TABLET(S): at 08:00

## 2017-12-14 RX ADMIN — Medication 50 MILLIGRAM(S): at 17:13

## 2017-12-14 RX ADMIN — Medication 1 DROP(S): at 06:28

## 2017-12-14 RX ADMIN — Medication 1 GRAM(S): at 17:13

## 2017-12-14 RX ADMIN — PANTOPRAZOLE SODIUM 40 MILLIGRAM(S): 20 TABLET, DELAYED RELEASE ORAL at 17:13

## 2017-12-14 RX ADMIN — PANTOPRAZOLE SODIUM 40 MILLIGRAM(S): 20 TABLET, DELAYED RELEASE ORAL at 06:28

## 2017-12-14 NOTE — PROGRESS NOTE ADULT - SUBJECTIVE AND OBJECTIVE BOX
Patient is a 89y old  Female who presents with a chief complaint of CVA (22 Nov 2017 09:17)        SUBJECTIVE / OVERNIGHT EVENTS:  no acute events o/n  pt denies complaints   daughter Karma at bedside      MEDICATIONS  (STANDING):  artificial tears (preservative free) Ophthalmic Solution 1 Drop(s) Left EYE two times a day  lactobacillus acidophilus 1 Tablet(s) Oral three times a day with meals  metoprolol     tartrate 50 milliGRAM(s) Oral two times a day  pantoprazole    Tablet 40 milliGRAM(s) Oral two times a day before meals  sucralfate 1 Gram(s) Oral four times a day    MEDICATIONS  (PRN):  acetaminophen   Tablet 650 milliGRAM(s) Oral every 6 hours PRN For Temp greater than 38 C (100.4 F)  acetaminophen   Tablet. 650 milliGRAM(s) Oral every 6 hours PRN Mild Pain (1 - 3)      Vital Signs Last 24 Hrs  T(C): 36.8 (14 Dec 2017 06:20), Max: 36.8 (13 Dec 2017 22:28)  T(F): 98.3 (14 Dec 2017 06:20), Max: 98.3 (14 Dec 2017 06:20)  HR: 64 (14 Dec 2017 06:20) (64 - 76)  BP: 151/86 (14 Dec 2017 06:20) (142/79 - 158/77)  BP(mean): --  RR: 18 (14 Dec 2017 06:20) (17 - 18)  SpO2: 96% (14 Dec 2017 06:20) (96% - 99%)  CAPILLARY BLOOD GLUCOSE        I&O's Summary        PHYSICAL EXAM:  GENERAL: no apparent distress, on room air  CHEST/LUNG: Clear to auscultation bilaterally; No wheezing or crackles  HEART: S1/S2  ABDOMEN: Soft, Nontender, Nondistended; Bowel sounds present  EXTREMITIES:  No clubbing, cyanosis, or edema  NEUROLOGY: awake, alert, responds to Qs appropriately    LABS:                        11.7   7.15  )-----------( 273      ( 14 Dec 2017 10:41 )             37.5     12-14    142  |  106  |  11  ----------------------------<  110<H>  3.9   |  22  |  0.81    Ca    9.2      14 Dec 2017 10:41  Mg     1.7     12-14      PT/INR - ( 14 Dec 2017 10:41 )   PT: 39.9 SEC;   INR: 3.50                    RADIOLOGY & ADDITIONAL TESTS:    Imaging Personally Reviewed:  Consultant(s) Notes Reviewed:    Care Discussed with Consultants/Other Providers:

## 2017-12-14 NOTE — PROGRESS NOTE ADULT - ASSESSMENT
89 yo F prior hx CVA, PUD, prior GIB, OA/RA, p/w left sided weakness, s/p tPA, found to have a right MCA infarct in imaging. Course c/b fever (2/2 aspiration PNA vs UTI), (CXR concerning for possible PNA, Ucx growing >100K Klebsiella), s/p Zosyn.

## 2017-12-14 NOTE — PROGRESS NOTE ADULT - PROBLEM SELECTOR PLAN 3
- being bridged to coumadin, now therapeutic, dosing per primary team   - given the patients history of a life threatening gi bleed during her hospital stay in [place] and inability to undergo endoscopic evaluation d/t her cardiac comorbidities she would be at a moderate to high risk for rebleeding if anticoagulation were to be restarted  - discussed at length with daughter with agreement and understanding that given negative occult and stable h/h role for endoscopic evaluation would not be beneficial and risks of undergoing anesthesia outweigh the benefits  - cont protonix bid while on a/c and carafate bid  - cont to trend cbc while on a/c  being bridged to coumadin with lovenox, counts remain stable, dose coumadin per primary team

## 2017-12-14 NOTE — PROGRESS NOTE ADULT - SUBJECTIVE AND OBJECTIVE BOX
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 Patient lives at home with daughter but has a home health aid that assists with ADLs. Patient went to bathroom at 11:30 and soon after cleaning up, was suddenly unable to lift her left leg/arm. Home health aid also noticed a left sided facial droop and that the patient was unable to speak, although was alert with her eyes open and bobbing her head. The home health aid immediately called daughter for help and patient was brought into ED. At her baseline, she is able to ambulate with a walker, bathe, eat, and perform other ADLs with assistance from the home health aid. Daughter reports that she has been otherwise well recently and has no complaints of recent illness, cough, fever, nausea, vomiting, diarrhea, weakness, no other complaints. Daughter states that patient was admitted to the ICU 1 year ago for a CVA and was put on blood thinners that resulted in her having a massive GI bleed. Blood thinners were discontinued at that time and patient has not been on anticoagulation since. Patient was attending physical therapy from Dec 2016 to February 2017. Daughter also endorses that her mother also had a transient episode of AFib during her last admission but has not been treated for it. (20 Nov 2017 19:30)    MEDICATIONS  (STANDING):  artificial tears (preservative free) Ophthalmic Solution 1 Drop(s) Left EYE two times a day  lactobacillus acidophilus 1 Tablet(s) Oral three times a day with meals  metoprolol     tartrate 50 milliGRAM(s) Oral two times a day  pantoprazole    Tablet 40 milliGRAM(s) Oral two times a day before meals  sodium chloride 0.45%. 1000 milliLiter(s) (60 mL/Hr) IV Continuous <Continuous>  sucralfate 1 Gram(s) Oral four times a day    MEDICATIONS  (PRN):  acetaminophen   Tablet 650 milliGRAM(s) Oral every 6 hours PRN For Temp greater than 38 C (100.4 F)  acetaminophen   Tablet. 650 milliGRAM(s) Oral every 6 hours PRN Mild Pain (1 - 3)      Allergies    ACE inhibitors (Angioedema)  morphine (Unknown)    Intolerances    caffeine (Stomach Upset)  lactose (Other)        General:  No wt loss, fevers, chills, night sweats, fatigue,   Eyes:  Good vision, no reported pain  ENT:  No sore throat, pain, runny nose, dysphagia  CV:  No pain, palpitations, hypo/hypertension  Resp:  No dyspnea, cough, tachypnea, wheezing  GI:  No pain, No nausea, No vomiting, No diarrhea, No constipation, No weight loss, No fever, No pruritis, No rectal bleeding, No tarry stools, No dysphagia,  :  No pain, bleeding, incontinence, nocturia  Muscle:  No pain, weakness  Neuro:  No weakness, tingling, memory problems  Psych:  No fatigue, insomnia, mood problems, depression  Endocrine:  No polyuria, polydipsia, cold/heat intolerance  Heme:  No petechiae, ecchymosis, easy bruisability  Skin:  No rash, tattoos, scars, edema      PHYSICAL EXAM:   Vital Signs:  Vital Signs Last 24 Hrs  T(C): 36.8 (14 Dec 2017 06:20), Max: 36.8 (13 Dec 2017 22:28)  T(F): 98.3 (14 Dec 2017 06:20), Max: 98.3 (14 Dec 2017 06:20)  HR: 64 (14 Dec 2017 06:20) (64 - 76)  BP: 151/86 (14 Dec 2017 06:20) (142/79 - 158/77)  BP(mean): --  RR: 18 (14 Dec 2017 06:20) (17 - 18)  SpO2: 96% (14 Dec 2017 06:20) (96% - 99%)  Daily     Daily I&O's Summary      GENERAL:  Appears stated age, well-groomed, well-nourished, no distress  HEENT:  NC/AT,  conjunctivae clear and pink, no thyromegaly, nodules, adenopathy, no JVD, sclera -anicteric, left facial droop  CHEST:  Full & symmetric excursion, no increased effort, breath sounds clear  HEART:  Regular rhythm, S1, S2, no murmur/rub/S3/S4, no abdominal bruit, no edema  ABDOMEN:  Soft, non-tender, non-distended, normoactive bowel sounds,  no masses ,no hepato-splenomegaly, no signs of chronic liver disease  EXTEREMITIES:  no cyanosis,clubbing or edema  SKIN:  No rash/erythema/ecchymoses/petechiae/wounds/abscess/warm/dry  NEURO:  left sided weakness      LABS:                        11.7   7.15  )-----------( 273      ( 14 Dec 2017 10:41 )             37.5     12-13    148<H>  |  110<H>  |  10  ----------------------------<  113<H>  3.6   |  25  |  0.88    Ca    8.6      13 Dec 2017 12:40  Mg     2.0     12-13      PT/INR - ( 14 Dec 2017 10:41 )   PT: 39.9 SEC;   INR: 3.50              amylase   lipase  RADIOLOGY & ADDITIONAL TESTS:

## 2017-12-14 NOTE — PROGRESS NOTE ADULT - PROBLEM SELECTOR PLAN 1
- INR 3.50 today, will hold Coumadin   - aim to keep INR closer to 2 given h/o massive GIB  - metoprolol increased to 50 po BID as per EP yesterday, BP/HR stable currently   - appreciate GI recs, c/w protonix bid and carafate while on A/C  - continue to monitor CBC, so far H/H stable, no signs of GIB

## 2017-12-14 NOTE — PROGRESS NOTE ADULT - PROBLEM SELECTOR PLAN 3
- acute right MCA CVA noted on CT scan confirmed on repeat CT head  - s/p tPA and MICU monitoring  - suspicion for cardioembolism (not confirmed) as patient increased risk (high [id number] score)  - no significant stenosis on carotid ultrasound  - TTE negative for LV or atrial thrombus  - c/w statin   - on Coumadin

## 2017-12-14 NOTE — PROGRESS NOTE ADULT - PROBLEM SELECTOR PLAN 1
- new right MCA infarct s/p TPA in ED   - neurology workup appreciated  - TTE negative for LV or Atrial thrombus  - given the patients history of a life threatening gi bleed during her hospital stay in [place] and inability to undergo endoscopic evaluation d/t her cardiac comorbidities she would be at a moderate to high risk for rebleeding if anticoagulation were to be restarted  - occult negative (-) and has no overt/occult signs of GI bleeding   - Video Capsule EGD not performed as patient did not want to attempt swallowing due to size of the capsule  - discussed at length with daughter and patient with agreement and understanding that given negative occult and stable h/h role for endoscopic evaluation would not be beneficial and risks of undergoing anesthesia outweigh the benefits  - cont protonix bid while on a/c and carafate; discussed with daughter at bedside  - cont to trend cbc while on a/c  at present remains stable  awaiting rehab placement

## 2017-12-15 LAB
BASOPHILS # BLD AUTO: 0.04 K/UL — SIGNIFICANT CHANGE UP (ref 0–0.2)
BASOPHILS NFR BLD AUTO: 0.5 % — SIGNIFICANT CHANGE UP (ref 0–2)
BUN SERPL-MCNC: 11 MG/DL — SIGNIFICANT CHANGE UP (ref 7–23)
CALCIUM SERPL-MCNC: 8.9 MG/DL — SIGNIFICANT CHANGE UP (ref 8.4–10.5)
CHLORIDE SERPL-SCNC: 107 MMOL/L — SIGNIFICANT CHANGE UP (ref 98–107)
CO2 SERPL-SCNC: 25 MMOL/L — SIGNIFICANT CHANGE UP (ref 22–31)
CREAT SERPL-MCNC: 0.88 MG/DL — SIGNIFICANT CHANGE UP (ref 0.5–1.3)
EOSINOPHIL # BLD AUTO: 0.15 K/UL — SIGNIFICANT CHANGE UP (ref 0–0.5)
EOSINOPHIL NFR BLD AUTO: 2 % — SIGNIFICANT CHANGE UP (ref 0–6)
GLUCOSE SERPL-MCNC: 108 MG/DL — HIGH (ref 70–99)
HCT VFR BLD CALC: 36.4 % — SIGNIFICANT CHANGE UP (ref 34.5–45)
HGB BLD-MCNC: 11.2 G/DL — LOW (ref 11.5–15.5)
IMM GRANULOCYTES # BLD AUTO: 0.03 # — SIGNIFICANT CHANGE UP
IMM GRANULOCYTES NFR BLD AUTO: 0.4 % — SIGNIFICANT CHANGE UP (ref 0–1.5)
INR BLD: 2.27 — HIGH (ref 0.88–1.17)
LYMPHOCYTES # BLD AUTO: 2.13 K/UL — SIGNIFICANT CHANGE UP (ref 1–3.3)
LYMPHOCYTES # BLD AUTO: 27.8 % — SIGNIFICANT CHANGE UP (ref 13–44)
MAGNESIUM SERPL-MCNC: 1.6 MG/DL — SIGNIFICANT CHANGE UP (ref 1.6–2.6)
MCHC RBC-ENTMCNC: 28.8 PG — SIGNIFICANT CHANGE UP (ref 27–34)
MCHC RBC-ENTMCNC: 30.8 % — LOW (ref 32–36)
MCV RBC AUTO: 93.6 FL — SIGNIFICANT CHANGE UP (ref 80–100)
MONOCYTES # BLD AUTO: 0.75 K/UL — SIGNIFICANT CHANGE UP (ref 0–0.9)
MONOCYTES NFR BLD AUTO: 9.8 % — SIGNIFICANT CHANGE UP (ref 2–14)
NEUTROPHILS # BLD AUTO: 4.56 K/UL — SIGNIFICANT CHANGE UP (ref 1.8–7.4)
NEUTROPHILS NFR BLD AUTO: 59.5 % — SIGNIFICANT CHANGE UP (ref 43–77)
NRBC # FLD: 0 — SIGNIFICANT CHANGE UP
PLATELET # BLD AUTO: 282 K/UL — SIGNIFICANT CHANGE UP (ref 150–400)
PMV BLD: 9.6 FL — SIGNIFICANT CHANGE UP (ref 7–13)
POTASSIUM SERPL-MCNC: 3.5 MMOL/L — SIGNIFICANT CHANGE UP (ref 3.5–5.3)
POTASSIUM SERPL-SCNC: 3.5 MMOL/L — SIGNIFICANT CHANGE UP (ref 3.5–5.3)
PROTHROM AB SERPL-ACNC: 26.6 SEC — HIGH (ref 9.8–13.1)
RBC # BLD: 3.89 M/UL — SIGNIFICANT CHANGE UP (ref 3.8–5.2)
RBC # FLD: 15.6 % — HIGH (ref 10.3–14.5)
SODIUM SERPL-SCNC: 146 MMOL/L — HIGH (ref 135–145)
WBC # BLD: 7.66 K/UL — SIGNIFICANT CHANGE UP (ref 3.8–10.5)
WBC # FLD AUTO: 7.66 K/UL — SIGNIFICANT CHANGE UP (ref 3.8–10.5)

## 2017-12-15 PROCEDURE — 99233 SBSQ HOSP IP/OBS HIGH 50: CPT

## 2017-12-15 RX ORDER — SODIUM CHLORIDE 9 MG/ML
1000 INJECTION, SOLUTION INTRAVENOUS
Qty: 0 | Refills: 0 | Status: DISCONTINUED | OUTPATIENT
Start: 2017-12-15 | End: 2017-12-18

## 2017-12-15 RX ORDER — WARFARIN SODIUM 2.5 MG/1
3 TABLET ORAL ONCE
Qty: 0 | Refills: 0 | Status: COMPLETED | OUTPATIENT
Start: 2017-12-15 | End: 2017-12-15

## 2017-12-15 RX ADMIN — Medication 650 MILLIGRAM(S): at 23:09

## 2017-12-15 RX ADMIN — Medication 1 GRAM(S): at 17:33

## 2017-12-15 RX ADMIN — Medication 1 TABLET(S): at 17:33

## 2017-12-15 RX ADMIN — Medication 1 GRAM(S): at 23:09

## 2017-12-15 RX ADMIN — WARFARIN SODIUM 3 MILLIGRAM(S): 2.5 TABLET ORAL at 17:36

## 2017-12-15 RX ADMIN — PANTOPRAZOLE SODIUM 40 MILLIGRAM(S): 20 TABLET, DELAYED RELEASE ORAL at 17:34

## 2017-12-15 RX ADMIN — Medication 1 DROP(S): at 17:34

## 2017-12-15 RX ADMIN — PANTOPRAZOLE SODIUM 40 MILLIGRAM(S): 20 TABLET, DELAYED RELEASE ORAL at 10:04

## 2017-12-15 RX ADMIN — Medication 650 MILLIGRAM(S): at 23:39

## 2017-12-15 RX ADMIN — Medication 50 MILLIGRAM(S): at 17:34

## 2017-12-15 RX ADMIN — Medication 1 DROP(S): at 10:03

## 2017-12-15 RX ADMIN — Medication 1 GRAM(S): at 10:04

## 2017-12-15 RX ADMIN — Medication 1 TABLET(S): at 10:04

## 2017-12-15 NOTE — PROGRESS NOTE ADULT - PROBLEM SELECTOR PLAN 1
- new right MCA infarct s/p TPA in ED   - neurology workup appreciated  - TTE negative for LV or Atrial thrombus  - given the patients history of a life threatening gi bleed during her hospital stay in [place] and inability to undergo endoscopic evaluation d/t her cardiac comorbidities she would be at a moderate to high risk for rebleeding if anticoagulation were to be restarted  - occult negative (-) and has no overt/occult signs of GI bleeding   - Video Capsule EGD not performed as patient did not want to attempt swallowing due to size of the capsule  - discussed at length with daughter and patient with agreement and understanding that given negative occult and stable h/h role for endoscopic evaluation would not be beneficial and risks of undergoing anesthesia outweigh the benefits  - cont protonix bid while on a/c and carafate; discussed with daughter at bedside  - cont to trend cbc while on a/c  at present remains stable  awaiting rehab placement

## 2017-12-15 NOTE — PROGRESS NOTE ADULT - PROBLEM SELECTOR PLAN 3
- being bridged to coumadin, now therapeutic, dosing per primary team   - given the patients history of a life threatening gi bleed during her hospital stay in [place] and inability to undergo endoscopic evaluation d/t her cardiac comorbidities she would be at a moderate to high risk for rebleeding if anticoagulation were to be restarted  - discussed at length with daughter with agreement and understanding that given negative occult and stable h/h role for endoscopic evaluation would not be beneficial and risks of undergoing anesthesia outweigh the benefits  - cont protonix bid while on a/c and carafate bid  - cont to trend cbc while on a/c  being bridged to coumadin with lovenox, counts remain stable, dose coumadin per primary team

## 2017-12-15 NOTE — CHART NOTE - NSCHARTNOTEFT_GEN_A_CORE
Notified by RN patient daughters at bedside concerned for change in mental status. Daughters state they are concerned patient does not know president or where she is. Per daughters patient restless overnight unable to sleep for long periods of time. Per RN patient has episodes of confusion, not new for patient. Per RN neuro exam unchanged.     Vital Signs Last 24 Hrs  T(C): 37.6 (12-14-17 @ 22:45), Max: 37.6 (12-14-17 @ 22:45)  T(F): 99.6 (12-14-17 @ 22:45), Max: 99.6 (12-14-17 @ 22:45)  HR: 69 (12-14-17 @ 22:45) (64 - 73)  BP: 118/74 (12-14-17 @ 22:45) (118/74 - 167/83)  RR: 18 (12-14-17 @ 22:45) (18 - 18)  SpO2: 98% (12-14-17 @ 22:45) (96% - 100%)    Appearance: Normal, NAD, NRD.  HEENT: Normal oral mucosa, PERRL, EOMI, left facial droop 	  Cardiovascular: Normal S1 S2, No JVD, No murmurs, No edema  Respiratory: Lungs clear to auscultation, no rales/rhonchi/wheezing	  Psychiatry: A & O x 2 to person and time, states she is at her fathers house; does not recall president; Mood & affect appropriate  Neurologic: follows commands, able to answer questions, sensation intact,  strength Left sided weakness 2/5 R 4/5  Extremities: No clubbing, cyanosis or edema, Deandre   Vascular: Peripheral pulses palpable    STAT CTH prelim showed evolution of right MCA territory infarct no hemorrhage/acute changes, will follow up in AM  d/w daughters at bedside. Will continue to monitor.

## 2017-12-15 NOTE — PROGRESS NOTE ADULT - SUBJECTIVE AND OBJECTIVE BOX
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 Patient lives at home with daughter but has a home health aid that assists with ADLs. Patient went to bathroom at 11:30 and soon after cleaning up, was suddenly unable to lift her left leg/arm. Home health aid also noticed a left sided facial droop and that the patient was unable to speak, although was alert with her eyes open and bobbing her head. The home health aid immediately called daughter for help and patient was brought into ED. At her baseline, she is able to ambulate with a walker, bathe, eat, and perform other ADLs with assistance from the home health aid. Daughter reports that she has been otherwise well recently and has no complaints of recent illness, cough, fever, nausea, vomiting, diarrhea, weakness, no other complaints. Daughter states that patient was admitted to the ICU 1 year ago for a CVA and was put on blood thinners that resulted in her having a massive GI bleed. Blood thinners were discontinued at that time and patient has not been on anticoagulation since. Patient was attending physical therapy from Dec 2016 to February 2017. Daughter also endorses that her mother also had a transient episode of AFib during her last admission but has not been treated for it. (20 Nov 2017 19:30)    MEDICATIONS  (STANDING):  artificial tears (preservative free) Ophthalmic Solution 1 Drop(s) Left EYE two times a day  lactobacillus acidophilus 1 Tablet(s) Oral three times a day with meals  metoprolol     tartrate 50 milliGRAM(s) Oral two times a day  pantoprazole    Tablet 40 milliGRAM(s) Oral two times a day before meals  sodium chloride 0.45%. 1000 milliLiter(s) (75 mL/Hr) IV Continuous <Continuous>  sucralfate 1 Gram(s) Oral four times a day  warfarin 3 milliGRAM(s) Oral once    MEDICATIONS  (PRN):  acetaminophen   Tablet 650 milliGRAM(s) Oral every 6 hours PRN For Temp greater than 38 C (100.4 F)  acetaminophen   Tablet. 650 milliGRAM(s) Oral every 6 hours PRN Mild Pain (1 - 3)      Allergies    ACE inhibitors (Angioedema)  morphine (Unknown)    Intolerances    caffeine (Stomach Upset)  lactose (Other)        General:  No wt loss, fevers, chills, night sweats, fatigue,   Eyes:  Good vision, no reported pain  ENT:  No sore throat, pain, runny nose, dysphagia  CV:  No pain, palpitations, hypo/hypertension  Resp:  No dyspnea, cough, tachypnea, wheezing  GI:  No pain, No nausea, No vomiting, No diarrhea, No constipation, No weight loss, No fever, No pruritis, No rectal bleeding, No tarry stools, No dysphagia,  :  No pain, bleeding, incontinence, nocturia  Muscle:  No pain, weakness  Neuro:  No weakness, tingling, memory problems  Psych:  No fatigue, insomnia, mood problems, depression  Endocrine:  No polyuria, polydipsia, cold/heat intolerance  Heme:  No petechiae, ecchymosis, easy bruisability  Skin:  No rash, tattoos, scars, edema      PHYSICAL EXAM:   Vital Signs:  Vital Signs Last 24 Hrs  T(C): 37.3 (15 Dec 2017 10:01), Max: 37.6 (14 Dec 2017 22:45)  T(F): 99.2 (15 Dec 2017 10:01), Max: 99.6 (14 Dec 2017 22:45)  HR: 65 (15 Dec 2017 13:19) (58 - 73)  BP: 129/56 (15 Dec 2017 13:19) (118/74 - 167/83)  BP(mean): --  RR: 18 (15 Dec 2017 10:01) (18 - 18)  SpO2: 98% (15 Dec 2017 10:01) (97% - 100%)  Daily     Daily I&O's Summary      GENERAL:  Appears stated age, well-groomed, well-nourished, no distress  HEENT:  NC/AT,  conjunctivae clear and pink, no thyromegaly, nodules, adenopathy, no JVD, sclera -anicteric, left facial droop  CHEST:  Full & symmetric excursion, no increased effort, breath sounds clear  HEART:  Regular rhythm, S1, S2, no murmur/rub/S3/S4, no abdominal bruit, no edema  ABDOMEN:  Soft, non-tender, non-distended, normoactive bowel sounds,  no masses ,no hepato-splenomegaly, no signs of chronic liver disease  EXTEREMITIES:  no cyanosis,clubbing or edema  SKIN:  No rash/erythema/ecchymoses/petechiae/wounds/abscess/warm/dry  NEURO:  left sided weakness      LABS:                        11.2   7.66  )-----------( 282      ( 15 Dec 2017 11:08 )             36.4     12-15    146<H>  |  107  |  11  ----------------------------<  108<H>  3.5   |  25  |  0.88    Ca    8.9      15 Dec 2017 11:08  Mg     1.6     12-15      PT/INR - ( 15 Dec 2017 11:08 )   PT: 26.6 SEC;   INR: 2.27              amylase   lipase  RADIOLOGY & ADDITIONAL TESTS:

## 2017-12-15 NOTE — PROGRESS NOTE ADULT - SUBJECTIVE AND OBJECTIVE BOX
Patient is a 89y old  Female who presents with a chief complaint of CVA (22 Nov 2017 09:17)      SUBJECTIVE / OVERNIGHT EVENTS:    MEDICATIONS  (STANDING):  artificial tears (preservative free) Ophthalmic Solution 1 Drop(s) Left EYE two times a day  lactobacillus acidophilus 1 Tablet(s) Oral three times a day with meals  metoprolol     tartrate 50 milliGRAM(s) Oral two times a day  pantoprazole    Tablet 40 milliGRAM(s) Oral two times a day before meals  sucralfate 1 Gram(s) Oral four times a day    MEDICATIONS  (PRN):  acetaminophen   Tablet 650 milliGRAM(s) Oral every 6 hours PRN For Temp greater than 38 C (100.4 F)  acetaminophen   Tablet. 650 milliGRAM(s) Oral every 6 hours PRN Mild Pain (1 - 3)        CAPILLARY BLOOD GLUCOSE        I&O's Summary      T(C): 37.3 (12-15-17 @ 10:01), Max: 37.6 (12-14-17 @ 22:45)  HR: 58 (12-15-17 @ 10:01) (58 - 73)  BP: 129/53 (12-15-17 @ 10:01) (118/74 - 167/83)  RR: 18 (12-15-17 @ 10:01) (18 - 18)  SpO2: 98% (12-15-17 @ 10:01) (97% - 100%)    PHYSICAL EXAM:  GENERAL: NAD, well-developed  HEAD:  Atraumatic, Normocephalic  EYES: EOMI, PERRLA, conjunctiva and sclera clear  NECK: Supple, No JVD  CHEST/LUNG: Clear to auscultation bilaterally; No wheeze  HEART: Regular rate and rhythm; No murmurs, rubs, or gallops  ABDOMEN: Soft, Nontender, Nondistended; Bowel sounds present  EXTREMITIES:  2+ Peripheral Pulses, No clubbing, cyanosis, or edema  PSYCH: AAOx3  NEUROLOGY: non-focal  SKIN: No rashes or lesions    LABS:                        11.7   7.15  )-----------( 273      ( 14 Dec 2017 10:41 )             37.5     12-14    142  |  106  |  11  ----------------------------<  110<H>  3.9   |  22  |  0.81    Ca    9.2      14 Dec 2017 10:41  Mg     1.7     12-14      PT/INR - ( 14 Dec 2017 10:41 )   PT: 39.9 SEC;   INR: 3.50                      RADIOLOGY & ADDITIONAL TESTS:    Imaging Personally Reviewed:    Consultant(s) Notes Reviewed:      Care Discussed with Consultants/Other Providers: Patient is a 89y old  Female who presents with a chief complaint of CVA (22 Nov 2017 09:17)      SUBJECTIVE / OVERNIGHT EVENTS: Pt in NAD ON confusion now back to baseline as per daughter.     MEDICATIONS  (STANDING):  artificial tears (preservative free) Ophthalmic Solution 1 Drop(s) Left EYE two times a day  lactobacillus acidophilus 1 Tablet(s) Oral three times a day with meals  metoprolol     tartrate 50 milliGRAM(s) Oral two times a day  pantoprazole    Tablet 40 milliGRAM(s) Oral two times a day before meals  sucralfate 1 Gram(s) Oral four times a day    MEDICATIONS  (PRN):  acetaminophen   Tablet 650 milliGRAM(s) Oral every 6 hours PRN For Temp greater than 38 C (100.4 F)  acetaminophen   Tablet. 650 milliGRAM(s) Oral every 6 hours PRN Mild Pain (1 - 3)        CAPILLARY BLOOD GLUCOSE        I&O's Summary      T(C): 37.3 (12-15-17 @ 10:01), Max: 37.6 (12-14-17 @ 22:45)  HR: 58 (12-15-17 @ 10:01) (58 - 73)  BP: 129/53 (12-15-17 @ 10:01) (118/74 - 167/83)  RR: 18 (12-15-17 @ 10:01) (18 - 18)  SpO2: 98% (12-15-17 @ 10:01) (97% - 100%)    PHYSICAL EXAM:  GENERAL: NAD, well-developed  HEAD:  Atraumatic, Normocephalic  EYES: EOMI, PERRLA, conjunctiva and sclera clear  NECK: Supple, No JVD  CHEST/LUNG: Clear to auscultation bilaterally; No wheeze  HEART: Regular rate and rhythm; No murmurs, rubs, or gallops  ABDOMEN: Soft, Nontender, Nondistended; Bowel sounds present  EXTREMITIES:  2+ Peripheral Pulses, No clubbing, cyanosis, or edema  NEUROLOGY: Lt facial droop; LT hemiparesis   SKIN: No rashes or lesions    LABS:                        11.7   7.15  )-----------( 273      ( 14 Dec 2017 10:41 )             37.5     12-14    142  |  106  |  11  ----------------------------<  110<H>  3.9   |  22  |  0.81    Ca    9.2      14 Dec 2017 10:41  Mg     1.7     12-14      PT/INR - ( 14 Dec 2017 10:41 )   PT: 39.9 SEC;   INR: 3.50                      RADIOLOGY & ADDITIONAL TESTS:    Imaging Personally Reviewed:< from: CT Head No Cont (12.14.17 @ 20:49) >  NTERPRETATION:  HISTORY: CVA. Change in mental status. Right MCA   infarction.    Technique: Noncontrast CT of the head was performed.    Multiple contiguous axial images were acquired from the skull base to the   vertex without the administration of intravenous contrast. Coronal and   sagittal reformations were made.    COMPARISON: CT head dated 11/21/2017, 11/20/2017.    FINDINGS:  Right frontal loss of cortical medullary junction with sulcal effacement   is again noted and unchanged from 11/21/2017. There is no hemorrhagic   restoration.    Severe severity microvascular ischemic change within the hemispheric   white matter otherwise appears stable. There is no intraparenchymal   hematoma, mass effect or midline shift. No abnormal extra-axial fluid   collections are present. There is no evidence of acute transcortical   territorial infarction.    The calvarium is intact. The visualized intraorbital compartments,   paranasal sinuses and tympanomastoid cavities appear free of acute   disease.      IMPRESSION:  Right frontal lobe MCA territory infarction unchanged. No hemorrhagic   transformation.  Severe severity microvascular ischemic change.    < end of copied text >      Consultant(s) Notes Reviewed:      Care Discussed with Consultants/Other Providers:

## 2017-12-15 NOTE — PROGRESS NOTE ADULT - PROBLEM SELECTOR PLAN 1
- INR 3.50 today, will hold Coumadin   - aim to keep INR closer to 2 given h/o massive GIB  - metoprolol increased to 50 po BID as per EP yesterday, BP/HR stable currently   - appreciate GI recs, c/w protonix bid and carafate while on A/C  - continue to monitor CBC, so far H/H stable, no signs of GIB - INR 2.27 dose coumadin 3 mg   - aim to keep INR closer to 2 given h/o massive GIB  - metoprolol increased to 50 po BID as per EP yesterday, BP/HR stable currently   - appreciate GI recs, c/w protonix bid and carafate while on A/C  - continue to monitor CBC, so far H/H stable, no signs of GIB

## 2017-12-15 NOTE — PROGRESS NOTE ADULT - PROBLEM SELECTOR PLAN 3
- acute right MCA CVA noted on CT scan confirmed on repeat CT head  - s/p tPA and MICU monitoring  - suspicion for cardioembolism (not confirmed) as patient increased risk (high [id number] score)  - no significant stenosis on carotid ultrasound  - TTE negative for LV or atrial thrombus  - c/w statin   - on Coumadin -Neuro f/u   - acute right MCA CVA noted on CT scan confirmed on repeat CT head  - s/p tPA and MICU monitoring  - suspicion for cardioembolism (not confirmed) as patient increased risk (high [id number] score)  - no significant stenosis on carotid ultrasound  - TTE negative for LV or atrial thrombus  - c/w statin   - on Coumadin -Neuro f/u non focal now repeat CT head neg   - acute right MCA CVA noted on CT scan confirmed on repeat CT head  - s/p tPA and MICU monitoring  - suspicion for cardioembolism (not confirmed) as patient increased risk (high [id number] score)  - no significant stenosis on carotid ultrasound  - TTE negative for LV or atrial thrombus  - c/w statin   - on Coumadin

## 2017-12-16 LAB
INR BLD: 1.86 — HIGH (ref 0.88–1.17)
PROTHROM AB SERPL-ACNC: 21.7 SEC — HIGH (ref 9.8–13.1)

## 2017-12-16 PROCEDURE — 99233 SBSQ HOSP IP/OBS HIGH 50: CPT

## 2017-12-16 RX ORDER — WARFARIN SODIUM 2.5 MG/1
3 TABLET ORAL ONCE
Qty: 0 | Refills: 0 | Status: COMPLETED | OUTPATIENT
Start: 2017-12-16 | End: 2017-12-16

## 2017-12-16 RX ADMIN — Medication 1 DROP(S): at 06:18

## 2017-12-16 RX ADMIN — Medication 1 GRAM(S): at 06:19

## 2017-12-16 RX ADMIN — Medication 50 MILLIGRAM(S): at 18:46

## 2017-12-16 RX ADMIN — PANTOPRAZOLE SODIUM 40 MILLIGRAM(S): 20 TABLET, DELAYED RELEASE ORAL at 18:46

## 2017-12-16 RX ADMIN — Medication 50 MILLIGRAM(S): at 06:18

## 2017-12-16 RX ADMIN — PANTOPRAZOLE SODIUM 40 MILLIGRAM(S): 20 TABLET, DELAYED RELEASE ORAL at 06:22

## 2017-12-16 RX ADMIN — Medication 1 TABLET(S): at 09:43

## 2017-12-16 RX ADMIN — Medication 1 GRAM(S): at 23:12

## 2017-12-16 RX ADMIN — Medication 1 TABLET(S): at 14:07

## 2017-12-16 RX ADMIN — Medication 1 TABLET(S): at 18:45

## 2017-12-16 RX ADMIN — Medication 650 MILLIGRAM(S): at 06:18

## 2017-12-16 RX ADMIN — Medication 1 GRAM(S): at 18:45

## 2017-12-16 RX ADMIN — Medication 1 DROP(S): at 18:53

## 2017-12-16 RX ADMIN — Medication 1 GRAM(S): at 14:07

## 2017-12-16 RX ADMIN — Medication 650 MILLIGRAM(S): at 06:48

## 2017-12-16 RX ADMIN — WARFARIN SODIUM 3 MILLIGRAM(S): 2.5 TABLET ORAL at 18:48

## 2017-12-16 NOTE — PROGRESS NOTE ADULT - PROBLEM SELECTOR PLAN 3
-Neuro f/u non focal now repeat CT head neg   - acute right MCA CVA noted on CT scan confirmed on repeat CT head  - s/p tPA and MICU monitoring  - suspicion for cardioembolism (not confirmed) as patient increased risk (high [id number] score)  - no significant stenosis on carotid ultrasound  - TTE negative for LV or atrial thrombus  - c/w statin   - on Coumadin

## 2017-12-16 NOTE — PROGRESS NOTE ADULT - PROBLEM SELECTOR PLAN 3
- being bridged to coumadin, now therapeutic, dosing per primary team   - given the patients history of a life threatening gi bleed during her hospital stay in [place] and inability to undergo endoscopic evaluation d/t her cardiac comorbidities she would be at a moderate to high risk for rebleeding if anticoagulation were to be restarted  - discussed at length with daughter with agreement and understanding that given negative occult and stable h/h role for endoscopic evaluation would not be beneficial and risks of undergoing anesthesia outweigh the benefits  - cont protonix bid while on a/c and carafate bid  - cont to trend cbc while on a/c  being bridged to coumadin with lovenox, counts remain stable, dose coumadin per primary team

## 2017-12-16 NOTE — PROGRESS NOTE ADULT - SUBJECTIVE AND OBJECTIVE BOX
Interval Events: pt denies abdominal pain, N/V/D/C, BRBPR, melena     HPI:89F hx of PUD, osteoarthritis, rheumatoid arthritis, prior CVA nov 2016 with residual speech changes, b/l knee replacements p/w left sided weakness. Patient lives at home with daughter but has a home health aid that assists with ADLs. Patient went to bathroom at 11:30 and soon after cleaning up, was suddenly unable to lift her left leg/arm. Home health aid also noticed a left sided facial droop and that the patient was unable to speak, although was alert with her eyes open and bobbing her head. The home health aid immediately called daughter for help and patient was brought into ED. At her baseline, she is able to ambulate with a walker, bathe, eat, and perform other ADLs with assistance from the home health aid. Daughter reports that she has been otherwise well recently and has no complaints of recent illness, cough, fever, nausea, vomiting, diarrhea, weakness, no other complaints. Daughter states that patient was admitted to the ICU 1 year ago for a CVA and was put on blood thinners that resulted in her having a massive GI bleed. Blood thinners were discontinued at that time and patient has not been on anticoagulation since. Patient was attending physical therapy from Dec 2016 to February 2017. Daughter also endorses that her mother also had a transient episode of AFib during her last admission but has not been treated for it. (20 Nov 2017 19:30)      MEDICATIONS  (STANDING):  artificial tears (preservative free) Ophthalmic Solution 1 Drop(s) Left EYE two times a day  lactobacillus acidophilus 1 Tablet(s) Oral three times a day with meals  metoprolol     tartrate 50 milliGRAM(s) Oral two times a day  pantoprazole    Tablet 40 milliGRAM(s) Oral two times a day before meals  sodium chloride 0.45%. 1000 milliLiter(s) (75 mL/Hr) IV Continuous <Continuous>  sucralfate 1 Gram(s) Oral four times a day  warfarin 3 milliGRAM(s) Oral once    MEDICATIONS  (PRN):  acetaminophen   Tablet 650 milliGRAM(s) Oral every 6 hours PRN For Temp greater than 38 C (100.4 F)  acetaminophen   Tablet. 650 milliGRAM(s) Oral every 6 hours PRN Mild Pain (1 - 3)      Allergies    ACE inhibitors (Angioedema)  morphine (Unknown)    Intolerances    caffeine (Stomach Upset)  lactose (Other)      Review of Systems:    General:  No wt loss, fevers, chills, night sweats, fatigue,   Eyes:  Good vision, no reported pain  ENT:  No sore throat, pain, runny nose, dysphagia  CV:  No pain, palpitations, hypo/hypertension  Resp:  No dyspnea, cough, tachypnea, wheezing  GI:  No pain, No nausea, No vomiting, No diarrhea, No constipation, No weight loss, No fever, No pruritis, No rectal bleeding, No tarry stools, No dysphagia,  :  No pain, bleeding, incontinence, nocturia  Muscle:  No pain, weakness  Neuro:  No weakness, tingling, memory problems  Psych:  No fatigue, insomnia, mood problems, depression  Endocrine:  No polyuria, polydipsia, cold/heat intolerance  Heme:  No petechiae, ecchymosis, easy bruisability  Skin:  No rash, tattoos, scars, edema      Vital Signs Last 24 Hrs  T(C): 36.6 (16 Dec 2017 06:16), Max: 36.9 (15 Dec 2017 21:59)  T(F): 97.9 (16 Dec 2017 06:16), Max: 98.5 (15 Dec 2017 21:59)  HR: 58 (16 Dec 2017 06:16) (58 - 75)  BP: 132/68 (16 Dec 2017 06:16) (123/71 - 140/69)  BP(mean): --  RR: 18 (16 Dec 2017 06:16) (18 - 18)  SpO2: 98% (16 Dec 2017 06:16) (96% - 98%)    PHYSICAL EXAM:    GENERAL:  Appears stated age, well-groomed, well-nourished, no distress  HEENT:  NC/AT,  conjunctivae clear and pink, no thyromegaly, nodules, adenopathy, no JVD, sclera -anicteric, left facial droop  CHEST:  Full & symmetric excursion, no increased effort, breath sounds clear  HEART:  Regular rhythm, S1, S2, no murmur/rub/S3/S4, no abdominal bruit, no edema  ABDOMEN:  Soft, non-tender, non-distended, normoactive bowel sounds,  no masses ,no hepato-splenomegaly, no signs of chronic liver disease  EXTREMITIES:  no cyanosis,clubbing or edema  SKIN:  No rash/erythema/ecchymoses/petechiae/wounds/abscess/warm/dry  NEURO:  left sided weakness      LABS:                        11.2   7.66  )-----------( 282      ( 15 Dec 2017 11:08 )             36.4     12-15    146<H>  |  107  |  11  ----------------------------<  108<H>  3.5   |  25  |  0.88    Ca    8.9      15 Dec 2017 11:08  Mg     1.6     12-15      PT/INR - ( 16 Dec 2017 05:46 )   PT: 21.7 SEC;   INR: 1.86                RADIOLOGY & ADDITIONAL TESTS:

## 2017-12-16 NOTE — PROGRESS NOTE ADULT - SUBJECTIVE AND OBJECTIVE BOX
Patient is a 89y old  Female who presents with a chief complaint of CVA (22 Nov 2017 09:17)      SUBJECTIVE / OVERNIGHT EVENTS:    MEDICATIONS  (STANDING):  artificial tears (preservative free) Ophthalmic Solution 1 Drop(s) Left EYE two times a day  lactobacillus acidophilus 1 Tablet(s) Oral three times a day with meals  metoprolol     tartrate 50 milliGRAM(s) Oral two times a day  pantoprazole    Tablet 40 milliGRAM(s) Oral two times a day before meals  sodium chloride 0.45%. 1000 milliLiter(s) (75 mL/Hr) IV Continuous <Continuous>  sucralfate 1 Gram(s) Oral four times a day  warfarin 3 milliGRAM(s) Oral once    MEDICATIONS  (PRN):  acetaminophen   Tablet 650 milliGRAM(s) Oral every 6 hours PRN For Temp greater than 38 C (100.4 F)  acetaminophen   Tablet. 650 milliGRAM(s) Oral every 6 hours PRN Mild Pain (1 - 3)        CAPILLARY BLOOD GLUCOSE        I&O's Summary      T(C): 36.6 (12-16-17 @ 06:16), Max: 37.3 (12-15-17 @ 10:01)  HR: 58 (12-16-17 @ 06:16) (58 - 75)  BP: 132/68 (12-16-17 @ 06:16) (123/71 - 140/69)  RR: 18 (12-16-17 @ 06:16) (18 - 18)  SpO2: 98% (12-16-17 @ 06:16) (96% - 98%)    PHYSICAL EXAM:  GENERAL: NAD, well-developed  HEAD:  Atraumatic, Normocephalic  EYES: Rt gaze prefence  NECK: Supple, No JVD  CHEST/LUNG: Clear to auscultation bilaterally; No wheeze  HEART: Regular rate and rhythm; No murmurs, rubs, or gallops  ABDOMEN: Soft, Nontender, Nondistended; Bowel sounds present  EXTREMITIES:  2+ Peripheral Pulses, No clubbing, cyanosis, or edema  PSYCH: AAOx3  NEUROLOGY: Lt hemiparesis 2/5 mild dsyarthria    LABS:                        11.2   7.66  )-----------( 282      ( 15 Dec 2017 11:08 )             36.4     12-15    146<H>  |  107  |  11  ----------------------------<  108<H>  3.5   |  25  |  0.88    Ca    8.9      15 Dec 2017 11:08  Mg     1.6     12-15      PT/INR - ( 16 Dec 2017 05:46 )   PT: 21.7 SEC;   INR: 1.86                      RADIOLOGY & ADDITIONAL TESTS:    Imaging Personally Reviewed:    Consultant(s) Notes Reviewed:      Care Discussed with Consultants/Other Providers: Patient is a 89y old  Female who presents with a chief complaint of CVA (22 Nov 2017 09:17)      SUBJECTIVE / OVERNIGHT EVENTS: Sitting up in bed daughter feeding pt. Tele- rate controlled afib    MEDICATIONS  (STANDING):  artificial tears (preservative free) Ophthalmic Solution 1 Drop(s) Left EYE two times a day  lactobacillus acidophilus 1 Tablet(s) Oral three times a day with meals  metoprolol     tartrate 50 milliGRAM(s) Oral two times a day  pantoprazole    Tablet 40 milliGRAM(s) Oral two times a day before meals  sodium chloride 0.45%. 1000 milliLiter(s) (75 mL/Hr) IV Continuous <Continuous>  sucralfate 1 Gram(s) Oral four times a day  warfarin 3 milliGRAM(s) Oral once    MEDICATIONS  (PRN):  acetaminophen   Tablet 650 milliGRAM(s) Oral every 6 hours PRN For Temp greater than 38 C (100.4 F)  acetaminophen   Tablet. 650 milliGRAM(s) Oral every 6 hours PRN Mild Pain (1 - 3)        CAPILLARY BLOOD GLUCOSE        I&O's Summary      T(C): 36.6 (12-16-17 @ 06:16), Max: 37.3 (12-15-17 @ 10:01)  HR: 58 (12-16-17 @ 06:16) (58 - 75)  BP: 132/68 (12-16-17 @ 06:16) (123/71 - 140/69)  RR: 18 (12-16-17 @ 06:16) (18 - 18)  SpO2: 98% (12-16-17 @ 06:16) (96% - 98%)    PHYSICAL EXAM:  GENERAL: NAD, well-developed  HEAD:  Atraumatic, Normocephalic  EYES: Rt gaze prefence  NECK: Supple, No JVD  CHEST/LUNG: Clear to auscultation bilaterally; No wheeze  HEART: Regular rate and rhythm; No murmurs, rubs, or gallops  ABDOMEN: Soft, Nontender, Nondistended; Bowel sounds present  EXTREMITIES:  2+ Peripheral Pulses, No clubbing, cyanosis, or edema  PSYCH: AAOx3  NEUROLOGY: Lt hemiparesis 2/5 mild dsyarthria    LABS:                        11.2   7.66  )-----------( 282      ( 15 Dec 2017 11:08 )             36.4     12-15    146<H>  |  107  |  11  ----------------------------<  108<H>  3.5   |  25  |  0.88    Ca    8.9      15 Dec 2017 11:08  Mg     1.6     12-15      PT/INR - ( 16 Dec 2017 05:46 )   PT: 21.7 SEC;   INR: 1.86                      RADIOLOGY & ADDITIONAL TESTS:    Imaging Personally Reviewed:    Consultant(s) Notes Reviewed:      Care Discussed with Consultants/Other Providers:

## 2017-12-16 NOTE — PROGRESS NOTE ADULT - PROBLEM SELECTOR PLAN 1
- INR 1.86 dose coumadin 3 mg today  - aim to keep INR closer to 2 given h/o massive GIB  - metoprolol increased to 50 po BID as per EP yesterday, BP/HR stable currently   - appreciate GI recs, c/w protonix bid and carafate while on A/C  - continue to monitor CBC

## 2017-12-16 NOTE — PROGRESS NOTE ADULT - PROBLEM SELECTOR PLAN 1
- new right MCA infarct s/p TPA in ED   - neurology workup appreciated  - TTE negative for LV or Atrial thrombus  - given the patients history of a life threatening gi bleed during her hospital stay in [place] and inability to undergo endoscopic evaluation d/t her cardiac comorbidities she would be at a moderate to high risk for rebleeding if anticoagulation were to be restarted  - occult negative (-) and has no overt/occult signs of GI bleeding   - Video Capsule EGD not performed as patient did not want to attempt swallowing due to size of the capsule  - discussed at length with daughter and patient with agreement and understanding that given negative occult and stable h/h role for endoscopic evaluation would not be beneficial and risks of undergoing anesthesia outweigh the benefits  - cont protonix bid while on a/c and carafate; discussed with daughter at bedside  - cont to trend cbc while on a/c  at present remains stable  awaiting rehab placement

## 2017-12-17 LAB
INR BLD: 2.65 — HIGH (ref 0.88–1.17)
PROTHROM AB SERPL-ACNC: 31.1 SEC — HIGH (ref 9.8–13.1)

## 2017-12-17 PROCEDURE — 99233 SBSQ HOSP IP/OBS HIGH 50: CPT

## 2017-12-17 RX ORDER — WARFARIN SODIUM 2.5 MG/1
1 TABLET ORAL ONCE
Qty: 0 | Refills: 0 | Status: DISCONTINUED | OUTPATIENT
Start: 2017-12-17 | End: 2017-12-17

## 2017-12-17 RX ORDER — WARFARIN SODIUM 2.5 MG/1
2 TABLET ORAL ONCE
Qty: 0 | Refills: 0 | Status: COMPLETED | OUTPATIENT
Start: 2017-12-17 | End: 2017-12-17

## 2017-12-17 RX ADMIN — Medication 50 MILLIGRAM(S): at 17:12

## 2017-12-17 RX ADMIN — Medication 1 DROP(S): at 17:13

## 2017-12-17 RX ADMIN — Medication 1 GRAM(S): at 12:48

## 2017-12-17 RX ADMIN — PANTOPRAZOLE SODIUM 40 MILLIGRAM(S): 20 TABLET, DELAYED RELEASE ORAL at 06:47

## 2017-12-17 RX ADMIN — Medication 1 TABLET(S): at 10:02

## 2017-12-17 RX ADMIN — Medication 1 DROP(S): at 06:48

## 2017-12-17 RX ADMIN — Medication 1 GRAM(S): at 17:12

## 2017-12-17 RX ADMIN — Medication 1 GRAM(S): at 06:47

## 2017-12-17 RX ADMIN — Medication 50 MILLIGRAM(S): at 06:48

## 2017-12-17 RX ADMIN — Medication 1 TABLET(S): at 17:13

## 2017-12-17 RX ADMIN — PANTOPRAZOLE SODIUM 40 MILLIGRAM(S): 20 TABLET, DELAYED RELEASE ORAL at 17:13

## 2017-12-17 RX ADMIN — WARFARIN SODIUM 2 MILLIGRAM(S): 2.5 TABLET ORAL at 17:12

## 2017-12-17 RX ADMIN — Medication 1 TABLET(S): at 12:48

## 2017-12-17 NOTE — PROGRESS NOTE ADULT - SUBJECTIVE AND OBJECTIVE BOX
Interval Events:pt denies abdominal pain, N/V/D/C, BRBPR, melena.     HPI:89F hx of PUD, osteoarthritis, rheumatoid arthritis, prior CVA nov 2016 with residual speech changes, b/l knee replacements p/w left sided weakness. Patient lives at home with daughter but has a home health aid that assists with ADLs. Patient went to bathroom at 11:30 and soon after cleaning up, was suddenly unable to lift her left leg/arm. Home health aid also noticed a left sided facial droop and that the patient was unable to speak, although was alert with her eyes open and bobbing her head. The home health aid immediately called daughter for help and patient was brought into ED. At her baseline, she is able to ambulate with a walker, bathe, eat, and perform other ADLs with assistance from the home health aid. Daughter reports that she has been otherwise well recently and has no complaints of recent illness, cough, fever, nausea, vomiting, diarrhea, weakness, no other complaints. Daughter states that patient was admitted to the ICU 1 year ago for a CVA and was put on blood thinners that resulted in her having a massive GI bleed. Blood thinners were discontinued at that time and patient has not been on anticoagulation since. Patient was attending physical therapy from Dec 2016 to February 2017. Daughter also endorses that her mother also had a transient episode of AFib during her last admission but has not been treated for it. (20 Nov 2017 19:30)      MEDICATIONS  (STANDING):  artificial tears (preservative free) Ophthalmic Solution 1 Drop(s) Both EYES two times a day  lactobacillus acidophilus 1 Tablet(s) Oral three times a day with meals  metoprolol     tartrate 50 milliGRAM(s) Oral two times a day  pantoprazole    Tablet 40 milliGRAM(s) Oral two times a day before meals  sodium chloride 0.45%. 1000 milliLiter(s) (75 mL/Hr) IV Continuous <Continuous>  sucralfate 1 Gram(s) Oral four times a day  warfarin 2 milliGRAM(s) Oral once    MEDICATIONS  (PRN):  acetaminophen   Tablet 650 milliGRAM(s) Oral every 6 hours PRN For Temp greater than 38 C (100.4 F)  acetaminophen   Tablet. 650 milliGRAM(s) Oral every 6 hours PRN Mild Pain (1 - 3)      Allergies    ACE inhibitors (Angioedema)  morphine (Unknown)    Intolerances    caffeine (Stomach Upset)  lactose (Other)      Review of Systems:    General:  No wt loss, fevers, chills, night sweats, fatigue,   Eyes:  Good vision, no reported pain  ENT:  No sore throat, pain, runny nose, dysphagia  CV:  No pain, palpitations, hypo/hypertension  Resp:  No dyspnea, cough, tachypnea, wheezing  GI:  No pain, No nausea, No vomiting, No diarrhea, No constipation, No weight loss, No fever, No pruritis, No rectal bleeding, No tarry stools, No dysphagia,  :  No pain, bleeding, incontinence, nocturia  Muscle:  No pain, weakness  Neuro:  No weakness, tingling, memory problems  Psych:  No fatigue, insomnia, mood problems, depression  Endocrine:  No polyuria, polydipsia, cold/heat intolerance  Heme:  No petechiae, ecchymosis, easy bruisability  Skin:  No rash, tattoos, scars, edema        Vital Signs Last 24 Hrs  T(C): 37.1 (17 Dec 2017 05:55), Max: 37.1 (17 Dec 2017 05:55)  T(F): 98.8 (17 Dec 2017 05:55), Max: 98.8 (17 Dec 2017 05:55)  HR: 62 (17 Dec 2017 06:44) (60 - 64)  BP: 173/92 (17 Dec 2017 06:44) (153/80 - 173/92)  BP(mean): --  RR: 18 (17 Dec 2017 05:55) (18 - 18)  SpO2: 95% (17 Dec 2017 05:55) (95% - 98%)    PHYSICAL EXAM:    GENERAL:  Appears stated age, well-groomed, well-nourished, no distress  HEENT:  NC/AT,  conjunctivae clear and pink, no thyromegaly, nodules, adenopathy, no JVD, sclera -anicteric, left facial droop  CHEST:  Full & symmetric excursion, no increased effort, breath sounds clear  HEART:  Regular rhythm, S1, S2, no murmur/rub/S3/S4, no abdominal bruit, no edema  ABDOMEN:  Soft, non-tender, non-distended, normoactive bowel sounds,  no masses ,no hepato-splenomegaly, no signs of chronic liver disease  EXTREMITIES:  no cyanosis,clubbing or edema  SKIN:  No rash/erythema/ecchymoses/petechiae/wounds/abscess/warm/dry  NEURO:  left sided weakness        LABS:          PT/INR - ( 17 Dec 2017 10:40 )   PT: 31.1 SEC;   INR: 2.65                RADIOLOGY & ADDITIONAL TESTS:

## 2017-12-17 NOTE — PROGRESS NOTE ADULT - PROBLEM SELECTOR PLAN 1
- INR 2.65 dose coumadin 2 mg today  - aim to keep INR closer to 2 given h/o massive GIB  - metoprolol increased to 50 po BID as per EP yesterday, BP/HR stable currently   - appreciate GI recs, c/w protonix bid and carafate while on A/C  - continue to monitor CBC

## 2017-12-17 NOTE — PROGRESS NOTE ADULT - PROBLEM SELECTOR PLAN 1
- new right MCA infarct s/p TPA in ED   - neurology workup appreciated  - TTE negative for LV or Atrial thrombus  - given the patients history of a life threatening gi bleed during her hospital stay in [place] and inability to undergo endoscopic evaluation d/t her cardiac comorbidities she would be at a moderate to high risk for rebleeding if anticoagulation were to be restarted  - occult negative (-) and has no overt/occult signs of GI bleeding   - Video Capsule EGD not performed as patient did not want to attempt swallowing due to size of the capsule  - discussed at length with daughter and patient with agreement and understanding that given negative occult and stable h/h role for endoscopic evaluation would not be beneficial and risks of undergoing anesthesia outweigh the benefits  - cont protonix bid while on a/c and carafate; discussed with daughter at bedside  - cont to trend cbc while on a/c  at present remains stable  awaiting rehab placement

## 2017-12-17 NOTE — PROGRESS NOTE ADULT - PROBLEM SELECTOR PLAN 3
- being bridged to coumadin, now therapeutic, dosing per primary team   - given the patients history of a life threatening gi bleed during her hospital stay in [place] and inability to undergo endoscopic evaluation d/t her cardiac comorbidities she would be at a moderate to high risk for rebleeding if anticoagulation were to be restarted  - discussed at length with daughter with agreement and understanding that given negative occult and stable h/h role for endoscopic evaluation would not be beneficial and risks of undergoing anesthesia outweigh the benefits  - cont protonix bid while on a/c and carafate bid  - cont to trend cbc while on a/c  being bridged to coumadin with lovenox, counts remain stable, dose coumadin per primary team

## 2017-12-17 NOTE — PROGRESS NOTE ADULT - SUBJECTIVE AND OBJECTIVE BOX
Patient is a 89y old  Female who presents with a chief complaint of CVA (22 Nov 2017 09:17)      SUBJECTIVE / OVERNIGHT EVENTS: pt in NAD. no compliants    MEDICATIONS  (STANDING):  artificial tears (preservative free) Ophthalmic Solution 1 Drop(s) Both EYES two times a day  lactobacillus acidophilus 1 Tablet(s) Oral three times a day with meals  metoprolol     tartrate 50 milliGRAM(s) Oral two times a day  pantoprazole    Tablet 40 milliGRAM(s) Oral two times a day before meals  sodium chloride 0.45%. 1000 milliLiter(s) (75 mL/Hr) IV Continuous <Continuous>  sucralfate 1 Gram(s) Oral four times a day    MEDICATIONS  (PRN):  acetaminophen   Tablet 650 milliGRAM(s) Oral every 6 hours PRN For Temp greater than 38 C (100.4 F)  acetaminophen   Tablet. 650 milliGRAM(s) Oral every 6 hours PRN Mild Pain (1 - 3)        CAPILLARY BLOOD GLUCOSE        I&O's Summary      T(C): 37.1 (12-17-17 @ 05:55), Max: 37.1 (12-17-17 @ 05:55)  HR: 62 (12-17-17 @ 06:44) (60 - 64)  BP: 173/92 (12-17-17 @ 06:44) (153/80 - 173/92)  RR: 18 (12-17-17 @ 05:55) (18 - 18)  SpO2: 95% (12-17-17 @ 05:55) (95% - 98%)    PHYSICAL EXAM:  GENERAL: NAD, well-developed  HEAD:  Atraumatic, Normocephalic  EYES: Rt gaze prefence  NECK: Supple, No JVD  CHEST/LUNG: Clear to auscultation bilaterally; No wheeze  HEART: Regular rate and rhythm; No murmurs, rubs, or gallops  ABDOMEN: Soft, Nontender, Nondistended; Bowel sounds present  EXTREMITIES:  2+ Peripheral Pulses, No clubbing, cyanosis, or edema  PSYCH: AAOx3  NEUROLOGY: Lt hemiparesis 2/5 mild dsyarthria    LABS:          PT/INR - ( 17 Dec 2017 10:40 )   PT: 31.1 SEC;   INR: 2.65                      RADIOLOGY & ADDITIONAL TESTS:    Imaging Personally Reviewed:    Consultant(s) Notes Reviewed:      Care Discussed with Consultants/Other Providers:

## 2017-12-17 NOTE — PROGRESS NOTE ADULT - PROBLEM SELECTOR PLAN 3
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

## 2017-12-18 VITALS — HEART RATE: 61 BPM | SYSTOLIC BLOOD PRESSURE: 147 MMHG | DIASTOLIC BLOOD PRESSURE: 87 MMHG

## 2017-12-18 LAB
APTT BLD: 46.1 SEC — HIGH (ref 27.5–37.4)
BUN SERPL-MCNC: 12 MG/DL — SIGNIFICANT CHANGE UP (ref 7–23)
CALCIUM SERPL-MCNC: 8.9 MG/DL — SIGNIFICANT CHANGE UP (ref 8.4–10.5)
CHLORIDE SERPL-SCNC: 107 MMOL/L — SIGNIFICANT CHANGE UP (ref 98–107)
CO2 SERPL-SCNC: 26 MMOL/L — SIGNIFICANT CHANGE UP (ref 22–31)
CREAT SERPL-MCNC: 0.82 MG/DL — SIGNIFICANT CHANGE UP (ref 0.5–1.3)
GLUCOSE SERPL-MCNC: 91 MG/DL — SIGNIFICANT CHANGE UP (ref 70–99)
INR BLD: 2.49 — HIGH (ref 0.88–1.17)
POTASSIUM SERPL-MCNC: 3.6 MMOL/L — SIGNIFICANT CHANGE UP (ref 3.5–5.3)
POTASSIUM SERPL-SCNC: 3.6 MMOL/L — SIGNIFICANT CHANGE UP (ref 3.5–5.3)
PROTHROM AB SERPL-ACNC: 28.2 SEC — HIGH (ref 9.8–13.1)
SODIUM SERPL-SCNC: 145 MMOL/L — SIGNIFICANT CHANGE UP (ref 135–145)

## 2017-12-18 PROCEDURE — 99239 HOSP IP/OBS DSCHRG MGMT >30: CPT

## 2017-12-18 RX ORDER — WARFARIN SODIUM 2.5 MG/1
2 TABLET ORAL
Qty: 15 | Refills: 0 | OUTPATIENT
Start: 2017-12-18 | End: 2018-01-01

## 2017-12-18 RX ORDER — SUCRALFATE 1 G
1 TABLET ORAL
Qty: 0 | Refills: 0 | COMMUNITY
Start: 2017-12-18

## 2017-12-18 RX ORDER — METOPROLOL TARTRATE 50 MG
1 TABLET ORAL
Qty: 0 | Refills: 0 | COMMUNITY
Start: 2017-12-18

## 2017-12-18 RX ORDER — WARFARIN SODIUM 2.5 MG/1
2 TABLET ORAL ONCE
Qty: 0 | Refills: 0 | Status: COMPLETED | OUTPATIENT
Start: 2017-12-18 | End: 2017-12-18

## 2017-12-18 RX ORDER — WARFARIN SODIUM 2.5 MG/1
1 TABLET ORAL
Qty: 8 | Refills: 0 | OUTPATIENT
Start: 2017-12-18 | End: 2018-01-01

## 2017-12-18 RX ADMIN — Medication 1 TABLET(S): at 17:05

## 2017-12-18 RX ADMIN — Medication 1 GRAM(S): at 17:05

## 2017-12-18 RX ADMIN — Medication 1 DROP(S): at 17:05

## 2017-12-18 RX ADMIN — Medication 1 GRAM(S): at 06:25

## 2017-12-18 RX ADMIN — PANTOPRAZOLE SODIUM 40 MILLIGRAM(S): 20 TABLET, DELAYED RELEASE ORAL at 17:04

## 2017-12-18 RX ADMIN — Medication 50 MILLIGRAM(S): at 06:25

## 2017-12-18 RX ADMIN — Medication 1 TABLET(S): at 13:28

## 2017-12-18 RX ADMIN — PANTOPRAZOLE SODIUM 40 MILLIGRAM(S): 20 TABLET, DELAYED RELEASE ORAL at 06:25

## 2017-12-18 RX ADMIN — Medication 1 DROP(S): at 06:25

## 2017-12-18 RX ADMIN — WARFARIN SODIUM 2 MILLIGRAM(S): 2.5 TABLET ORAL at 17:27

## 2017-12-18 RX ADMIN — Medication 50 MILLIGRAM(S): at 17:05

## 2017-12-18 RX ADMIN — Medication 1 GRAM(S): at 13:28

## 2017-12-18 RX ADMIN — Medication 1 TABLET(S): at 10:35

## 2017-12-18 NOTE — PROGRESS NOTE ADULT - SUBJECTIVE AND OBJECTIVE BOX
Patient is a 89y old  Female who presents with a chief complaint of CVA (22 Nov 2017 09:17)        SUBJECTIVE / OVERNIGHT EVENTS:  no acute events o/n  pt denies cp/sob      MEDICATIONS  (STANDING):  artificial tears (preservative free) Ophthalmic Solution 1 Drop(s) Both EYES two times a day  lactobacillus acidophilus 1 Tablet(s) Oral three times a day with meals  metoprolol     tartrate 50 milliGRAM(s) Oral two times a day  pantoprazole    Tablet 40 milliGRAM(s) Oral two times a day before meals  sodium chloride 0.45%. 1000 milliLiter(s) (75 mL/Hr) IV Continuous <Continuous>  sucralfate 1 Gram(s) Oral four times a day    MEDICATIONS  (PRN):  acetaminophen   Tablet 650 milliGRAM(s) Oral every 6 hours PRN For Temp greater than 38 C (100.4 F)  acetaminophen   Tablet. 650 milliGRAM(s) Oral every 6 hours PRN Mild Pain (1 - 3)      Vital Signs Last 24 Hrs  T(C): 36.7 (18 Dec 2017 06:19), Max: 37.1 (17 Dec 2017 14:54)  T(F): 98.1 (18 Dec 2017 06:19), Max: 98.7 (17 Dec 2017 14:54)  HR: 56 (18 Dec 2017 06:19) (56 - 62)  BP: 149/68 (18 Dec 2017 06:19) (149/68 - 159/74)  BP(mean): --  RR: 16 (18 Dec 2017 06:19) (16 - 18)  SpO2: 98% (18 Dec 2017 06:19) (98% - 98%)  CAPILLARY BLOOD GLUCOSE        I&O's Summary    PHYSICAL EXAM:  GENERAL: no apparent distress, on room air  CHEST/LUNG: Clear to auscultation bilaterally; No wheezing or crackles  HEART: S1/S2  ABDOMEN: Soft, Nontender, Nondistended; Bowel sounds present  EXTREMITIES:  No clubbing, cyanosis, or edema  NEUROLOGY: awake, alert, responds to Qs appropriately          LABS:    12-18    145  |  107  |  12  ----------------------------<  91  3.6   |  26  |  0.82    Ca    8.9      18 Dec 2017 07:00      PT/INR - ( 18 Dec 2017 07:00 )   PT: 28.2 SEC;   INR: 2.49          PTT - ( 18 Dec 2017 07:00 )  PTT:46.1 SEC          RADIOLOGY & ADDITIONAL TESTS:    Imaging Personally Reviewed:  Consultant(s) Notes Reviewed:    Care Discussed with Consultants/Other Providers:

## 2017-12-18 NOTE — CHART NOTE - NSCHARTNOTEFT_GEN_A_CORE
Source: Patient [  ]    Family [ X ]     other [ ]    Patient seen for moderate malnutrition and length of stay follow-up. Met with patient's daughter at bedside at every visit. Reports PO intake/acceptance has been gradually improving and appreciates food preference implemented from last visit which is assisting in appetite. Good intake of Ensure Pudding Vanilla.       CURRENT DIET : Dysphagia 1 Puree, Honey Consistency; Ensure Pudding 3x daily (510 kcals, 12g protein).       _______WEIGHTS:________  (11/21) 161.8#/ 73.4kg  no new weight in chart       EDEMA: 2+ b/l arm  SKIN: no pressure injuries noted      _______PERTINENT MEDICATIONS:________     metoprolol     tartrate  pantoprazole    Tablet  sucralfate      _______PERTINENT LABS:_________    Sodium, Serum: 145 (12-18 @ 07:00)  Potassium, Serum: 3.6 (12-18 @ 07:00)  Glucose, Serum: 91 (12-18 @ 07:00)  Blood Urea Nitrogen, Serum: 12 (12-18 @ 07:00)    Hemoglobin A1C, Whole Blood: 5.4 % [4.0 - 5.6] (11-21-17 @ 02:33)    Estimated Needs:   [ X ] no change since previous assessment  [ ] recalculated:       Previous Nutrition Diagnosis:   [ X ] Issues chewing/swallowing   [ X ] Malnutrition, moderate  Nutrition Diagnoses is [ X ] ongoing  [ ] resolved [ ] not applicable        New Nutrition Diagnosis: [ X ] not applicable        ___________________ADDITIONAL RECOMMENDATIONS___________________  1) Continue Dysphagia 1 Puree, Honey Consistency diet with Ensure Pudding 3x daily (510 kcals, 12g protein).  2) Will continue to encourage and provide food preferences requested (yogurt at breakfast 3x/week, extra Honey Consistency thickening packets, Vanilla Ensure Pudding, Pureed Desserts, Baby sweet potatoes)   3) Please obtain new weight         Liudmila Louis, BRIGETTEN, CDN, CDE (Pager [phone])

## 2017-12-18 NOTE — PROGRESS NOTE ADULT - PROBLEM SELECTOR PROBLEM 10
Moderate protein malnutrition

## 2017-12-18 NOTE — PROGRESS NOTE ADULT - PROBLEM SELECTOR PROBLEM 1
CVA (cerebral vascular accident)
Fever in other diseases
Hypernatremia
Paroxysmal atrial fibrillation
Fever in other diseases
Fever in other diseases
CVA (cerebral vascular accident)
Paroxysmal atrial fibrillation
Fever in other diseases
Paroxysmal atrial fibrillation
Fever in other diseases
Paroxysmal atrial fibrillation
Fever in other diseases

## 2017-12-18 NOTE — PROGRESS NOTE ADULT - PROBLEM SELECTOR PLAN 3
-Neuro f/u non focal now repeat CT head neg   - acute right MCA CVA noted on CT scan confirmed on repeat CT head  - s/p tPA and MICU monitoring  - suspicion for cardioembolism (not confirmed) as patient increased risk (high [id number] score)  - no significant stenosis on carotid ultrasound  - TTE negative for LV or atrial thrombus  - c/w statin   - on Coumadin

## 2017-12-18 NOTE — PROGRESS NOTE ADULT - PROBLEM SELECTOR PLAN 1
- INR 2.49 today, will dose Coumadin 3 mg tonight, with plan for alternating 3mg and 2mg on discharge   - aim to keep INR closer to 2 given h/o massive GIB  - metoprolol increased to 50 po BID as per EP last week, BP/HR stable currently   - appreciate GI recs, c/w protonix bid and carafate while on A/C  - continue to monitor CBC

## 2017-12-18 NOTE — PROGRESS NOTE ADULT - PROBLEM SELECTOR PROBLEM 5
Hypokalemia
Paroxysmal atrial fibrillation
Hypernatremia
Paroxysmal atrial fibrillation
Hypernatremia
Hypokalemia
Osteoarthritis
Paroxysmal atrial fibrillation
Rheumatoid arthritis, adult
Hypernatremia
Paroxysmal atrial fibrillation
Rheumatoid arthritis, adult
Hypokalemia
Hypernatremia
Osteoarthritis
Rheumatoid arthritis, adult

## 2017-12-18 NOTE — PROGRESS NOTE ADULT - PROBLEM SELECTOR PLAN 10
appreciate nutrition recs

## 2018-04-11 NOTE — PROGRESS NOTE ADULT - PROBLEM SELECTOR PLAN 9
Discussed with patient's Daughter on 12/9: concerned with number of blood draws and fact that patient is a difficult stick, arterial attempts done. Discussed option of quality vs quantity of life and palliation but daughter states she would prefer to take the risk with A/C at this time as opposed to recurrent stroke. Does not want a palliative care consult at this time. Will continue A/C with daily blood draws until INR therapeutic. no

## 2018-09-06 NOTE — PROGRESS NOTE ADULT - PROVIDER SPECIALTY LIST ADULT
Hospitalist Mother's Information    Labor Events     labor?:  No  Rupture type:  Artificial=AROM  Fluid color:  Clear  Fluid odor:  None     Mother Delivery Information    Episiotomy:  None  Lacerations:  1st  # of Repair Packets:  2  Vaginal Delivery Est. Blood Loss (mL):  300  Surgical or Additional Est. Blood Loss (mL):  0 (View Only):  Edit in Flowsheets   Combined Est. Blood Loss (mL):  300        Amanda Hernandez [[id number]]    Events of Labor     labor?:  No   steroids?:  None  Cervical ripening date/time:     Rupture date/time: 18 0910   Rupture type:  Artificial=AROM  Fluid color:  Clear  Fluid odor:  None  Induction:  Oxytocin, AROM  Indications for induction:  Hypertension  Labor complications:  None     Print Group Title    Labor onset date/time:     Dilation complete date/time:   18 1524 CDT   Start pushing:    Decision time (emergent ):        Anesthesia    Method:  Epidural     Assisted Delivery Details    Forceps attempted?:  No  Vacuum extractor attempted?:  No     Document Additional Attempt       Document Additional Attempt             Shoulder Dystocia    Shoulder dystocia present?:  No  Add Second Maneuver  Add Third Maneuver  Add Fourth Maneuver  Add Fifth Maneuver  Add Sixth Maneuver  Add Seventh Maneuver  Add Eighth Maneuver  Add Ninth Maneuver      Presentation    Presentation:  Vertex  Position:  Right  _:  Occiput  _:  Anterior      Information     Changing the 's delivery date/time could affect patient care.:     Delivery date/time:   18 1605   Delivery type:  Vaginal, Spontaneous Delivery    Details:         Delivery Providers    Delivering clinician:  Angie Souza MD   Provider Role    Michael Beckwith RN Delivery Nurse    Mulugeta Padron, RN Registered Nurse    Mayo Diggs, RCP Respiratory Therapist (Day)    [facility] Surgical Tech    Christy Sanchez RN Registered Nurse      Cord    Vessels:  3

## 2018-10-03 NOTE — ED PROVIDER NOTE - CARDIAC, MLM
Patient discharged home today with [facility]. Medications and discharge instructions reviewed with patient. Patient verbalized understanding. Patient stable upon discharge.   Electronically signed by Rob Davis RN on 10/3/2018 at 11:45 AM Physical Therapy  Name: Willow Osborne  MRN:  [id number]  Date of service:  10/2/2018     10/02/18 0915   Restrictions/Precautions   Restrictions/Precautions Weight Bearing; Fall Risk   Required Braces or Orthoses? No   Lower Extremity Weight Bearing Restrictions   Left Lower Extremity Weight Bearing Weight Bearing As Tolerated   General   Chart Reviewed Yes   Additional Pertinent Hx pt. with recent R TKA   Response To Previous Treatment Not applicable   Family / Caregiver Present Yes   Referring Practitioner Dr. Bindu Jackson. willing to get up and wants to walk to the bathroom. General Comment   Comments RN, Pato Current PT. Pain Screening   Patient Currently in Pain Yes   Intervention List Patient able to continue with treatment   Pain Assessment   Pain Assessment 0-10   Pain Level 4   Pain Type Surgical pain   Pain Location Incision;Knee   Pain Descriptors Sore   Pain Intervention(s) Repositioned;Cold applied; Ambulation/Increased activity   Response to Pain Intervention Patient Satisfied   Patient Observation   Observations IV   Transfers   Stand to sit Minimal Assistance   Comment v. cues to straighten LLE to sit and reach back   Ambulation   Ambulation?  Yes   WB Status FWBAT   Ambulation 1   Surface level tile   Device Rolling Walker   Assistance Minimal assistance   Quality of Gait slow pace, antalgic   Distance 10'   Comments starting to feel light headed   Balance   Posture Good   Sitting - Static Good;+   Sitting - Dynamic Good;+   Standing - Static Good;-   Standing - Dynamic Fair;+   Patient Goals    Patient goals  go home   Short term goals   Time Frame for Short term goals 2 wks   Short term goal 1 supine to sit indep   Short term goal 2 sit to stand indep   Short term goal 3 amb. 100' with RW SBA, FWBAT   Short term goal 4 up/down 2-3 stairs SBA   Conditions Requiring Skilled Therapeutic Intervention   Body structures, Functions, Activity limitations Decreased functional Subjective:     Post-Operative Day: 1 Status Post left tka complex  Systemic or Specific Complaints:No Complaints  no nausea Pain 3    Objective:     Patient Vitals for the past 24 hrs:   BP Temp Temp src Pulse Resp SpO2 Height Weight   10/02/18 0630 139/64 98 °F (36.7 °C) Temporal 84 16 92 % - -   10/02/18 0327 120/66 97.7 °F (36.5 °C) Temporal 74 16 94 % - -   10/02/18 0127 - 97.3 °F (36.3 °C) - - 20 - - -   10/01/18 2313 (!) 147/69 97.3 °F (36.3 °C) Temporal 87 20 91 % - -   10/01/18 1837 (!) 150/89 97.2 °F (36.2 °C) Temporal 86 20 92 % - -   10/01/18 1729 (!) 143/86 97.7 °F (36.5 °C) Temporal 74 16 91 % - -   10/01/18 1633 (!) 143/78 97.3 °F (36.3 °C) Temporal 70 16 95 % - -   10/01/18 1527 (!) 151/77 97.2 °F (36.2 °C) Temporal 69 18 95 % - -   10/01/18 1500 (!) 146/83 97.1 °F (36.2 °C) Temporal 65 16 93 % - -   10/01/18 1427 (!) 146/78 97 °F (36.1 °C) Temporal 66 16 93 % - -   10/01/18 1415 - - - 65 - - - -   10/01/18 1410 134/73 - - 61 15 92 % - -   10/01/18 1405 134/72 97.2 °F (36.2 °C) Temporal 71 16 93 % - -   10/01/18 1359 138/70 - - 67 14 92 % - -   10/01/18 1355 128/68 - - 68 16 92 % - -   10/01/18 1349 (!) 149/71 - - 68 15 92 % - -   10/01/18 1345 (!) 141/76 - - 68 15 93 % - -   10/01/18 1340 (!) 140/71 - - 69 16 91 % - -   10/01/18 1334 133/71 97.9 °F (36.6 °C) Temporal 73 21 91 % - -   10/01/18 0948 (!) 154/80 98.2 °F (36.8 °C) Tympanic 71 14 98 % 5' 9\" (1.753 m) 284 lb (128.8 kg)       General: alert, appears stated age and cooperative   Exam: Incision clean, dry, and intact, no evidence of infection. Neurovascular: Exam normal       Data Review:  Recent Labs      10/02/18   0242   HGB  13.0*     Recent Labs      10/02/18   0242   NA  142   K  4.8   CREATININE  1.4*     Recent Labs      10/02/18   0242   LABGLOM  50*           Assessment:     Status Post bilateral tka complex. Doing well postoperatively.      Plan:     Continues current post-op course  Bolus for elevated treatment    Orientation  Orientation  Overall Orientation Status: Within Functional Limits    Social/Functional History  Social/Functional History  Lives With: Spouse  Type of Home: House  Home Layout: Two level, Able to Live on Main level with bedroom/bathroom  Home Access: Stairs to enter with rails  Entrance Stairs - Number of Steps: 4  Bathroom Shower/Tub: Tub/Shower unit  Bathroom Toilet: Handicap height (BSC)  Bathroom Equipment: Grab bars in shower  Home Equipment: Rolling walker, Cane  ADL Assistance: Independent  Ambulation Assistance: Independent  Transfer Assistance: Independent  Active : Yes  Occupation: Retired  Leisure & Hobbies: in the process of flipping a house  Additional Comments: pt's granddaughter helps out a little  Objective     Observation/Palpation  Posture: Good  Observation: IV, L knee unwrapped    AROM RLE (degrees)  RLE AROM: WFL  AROM LLE (degrees)  LLE AROM : Exceptions  LLE General AROM: hip flexes to 90 deg, ankle WFLs, knee does not reach 90 deg flexion  Strength RLE  Strength RLE: WFL  Comment: grossly 4+/5  Strength LLE  Strength LLE: Exception  Comment: knee 2+/5, ankle 3+/5, hip 3-/5     Sensation  Overall Sensation Status: WFL  Bed mobility  Supine to Sit: Minimal assistance  Sit to Supine: Unable to assess  Comment: pt. sat on EOB x 10 min SBA  Transfers  Sit to Stand: Minimal Assistance  Comment: PT started pt's underwear over his feet and pt. pulled it up to his knees in sitting and able to pull them up and adjust them in standing. Ambulation  Ambulation?: Yes  WB Status: FWBAT  Ambulation 1  Surface: level tile  Device: Rolling Walker  Assistance: Minimal assistance  Quality of Gait: slow pace, antalgic  Distance: 10', 3'  Comments: pt. stood at the sink x 1 min to get a drink. Pt. stood at toilet to use urinal x 2 mins CGA.       Balance  Posture: Good  Sitting - Static: Good;+  Sitting - Dynamic: Good;+  Standing - Static: Good;-  Standing - Dynamic: Fair;+ time  Mental status - alert, oriented to person, place, and time, normal mood, behavior, speech, dress, motor activity, and thought processes  Eyes - pupils equal and reactive, extraocular eye movements intact  Ears - bilateral TM's and external ear canals normal, hearing grossly normal bilaterally  Mouth - mucous membranes moist, pharynx normal without lesions  Neck - supple, no significant adenopathy  Lymphatics - no palpable lymphadenopathy, no hepatosplenomegaly  Chest - clear to auscultation, no wheezes, rales or rhonchi, symmetric air entry, no tachypnea, retractions or cyanosis  Heart - normal rate, regular rhythm, normal S1, S2, no murmurs, rubs, clicks or gallops  Abdomen - soft, nontender, nondistended, no masses or organomegaly bowel sounds normal  Neurological - alert, oriented, normal speech, no focal findings or movement disorder noted  Musculoskeletal -Postoperative changes noted to the left knee  Extremities - peripheral pulses normal, no pedal edema, no clubbing or cyanosis  Skin - left knee without signs of infection or drainage. No skin breakdown of the bilateral lower extremities. Assessment and Plan:     Primary Problem:  Primary osteoarthritis, left knee    Hospital Problem list:Treatment Plan: Active Problems:    Arthritis of knee--status post left total knee replacement    Slow transit constipationbowel regimen    Iron deficiency anemiaanemia profile reviewed    Morbid obesity due to excess calorieseducated risk risk associated with such    CHLOÉ (obstructive sleep apnea)CPAP    Essential hypertensionmonitor conservatively, initiate antihypertensive regimen if requires    Chronic gout of multiple sites-Continue allopurinol    VTE prophylaxisARCB discussed    Discharge planning: Home Later today. Patient is medically stable. He did well status post right knee back in June and has done excellent with this left knee replacement.   Renal function improved with IV fluid bolus and and coordination of care.      Electronically signed by Kapil Brown PA-C on 10/2/2018 at 6:56 AM Normal rate, regular rhythm.  Heart sounds S1, S2.  No murmurs, rubs or gallops.

## 2019-09-20 NOTE — ADVANCED PRACTICE NURSE CONSULT - RECOMMEDATIONS
Topical Recommendations;  Incontinence/Moisture associated dermatitis- sacral fold, bilateral buttocks, and bilateral ischium- provide perineal care every shift and prn, dry well. Apply TRIAD moisture barrier paste. While cleansing gently remove soiled layer of TRIAD to maintain tissue integrity.    Continue low air loss bed therapy, continue heel elevation with Z-flex fluidized positioning boots, continue to turn & reposition q2h with Z-tomy fluidized positioning device, soft pillow between bony prominences, continue moisture/incontinence management as recommended above & single breathable pad, continue measures to decrease friction/shear/pressure.     Continue with nutritional support as per dietary/orders.    Findings and plan discussed with patient, daughter, primary team and bedside RN. Patient educated on topical wound therapy. All questions and concerns addressed.    Please contact Wound Care Service Line if we can be of further assistance (ext [phone]).
0 = independent

## 2019-12-14 NOTE — ED PROCEDURE NOTE - ATTENDING CONTRIBUTION TO CARE
Patent Dr. Thompson: I have personally performed a face to face bedside history and physical examination of this patient. I have discussed the history, examination, review of systems, assessment and plan of management with the resident. I have reviewed the electronic medical record and amended it to reflect my history, review of systems, physical exam, assessment and plan.

## 2020-03-19 NOTE — DISCHARGE NOTE ADULT - APPOINTMENTS. PLEASE FOLLOW UP WITH YOUR DOCTOR WITHIN 3 DAYS OF DISCHARGE TO SCHEDULE YOUR NEXT BLOOD TEST.
Rash with minimal improvement on oral antibiotic, but some reduction in erythema today. Mass is 3cm  · Elevate the leg  · Increase antibiotic to clinda 450mg QID for 5 days  · All lupus markers stable   Statement Selected

## 2020-08-27 NOTE — PROGRESS NOTE ADULT - PROBLEM SELECTOR PLAN 8
DVT ppx: on systemic A/C   PT recs: rehab No Residual Tumor Seen Histology Text: There was no skin cancer in the sections examined.

## 2020-09-19 NOTE — OCCUPATIONAL THERAPY INITIAL EVALUATION ADULT - PHYSICAL ASSIST/NONPHYSICAL ASSIST: SUPINE/SIT, REHAB EVAL
-- DO NOT REPLY / DO NOT REPLY ALL --  -- Message is from the [facility]--    Result Request  Type of Test / Lab: Blood Test For A1C   Date Test / Lab: Wednesday  Location: [facility]/ Lab Core  Test / Lab ordered/authorized by: Kushal Fagan    Other comments:Would like the results of my blood work    Preferred Delivery Method   Call back patient with results.  Phone number:  [phone] Call this number after 10:30 only    Caller Information       Type Contact Phone    09/19/2020 09:46 AM CDT Phone (Incoming) Adriana Curry (Self) [phone] ()          Alternative phone number: none    Turnaround time given to caller:   \"This message will be sent to [state Provider's name]. The clinical team will fulfill your request as soon as they review your message when the office opens on Monday (or after the holiday).\"   2 person assist/verbal cues/set-up required

## 2021-01-20 NOTE — PHYSICAL THERAPY INITIAL EVALUATION ADULT - IMPAIRED TRANSFERS: SIT/STAND, REHAB EVAL
impaired balance/narrow base of support/decreased strength recall of 3/3 words after 3 min delay/intact

## 2021-02-24 NOTE — PROGRESS NOTE ADULT - PROBLEM SELECTOR PROBLEM 9
Advanced care planning/counseling discussion
Podiatry pager #: [phone] ([place])/ [phone] ([facility])    Patient is a 89y old  Female who presents with a chief complaint of AMS (23 Feb 2021 12:57)       INTERVAL HPI/OVERNIGHT EVENTS:  Patient seen and evaluated at bedside.  Pt is resting comfortable in NAD. Denies N/V/F/C.     Allergies    penicillin (Rash)    Intolerances        Vital Signs Last 24 Hrs  T(C): 37.1 (24 Feb 2021 11:51), Max: 37.1 (24 Feb 2021 11:51)  T(F): 98.7 (24 Feb 2021 11:51), Max: 98.7 (24 Feb 2021 11:51)  HR: 73 (24 Feb 2021 11:51) (73 - 94)  BP: 135/64 (24 Feb 2021 11:51) (120/68 - 138/83)  BP(mean): --  RR: 18 (24 Feb 2021 11:51) (18 - 18)  SpO2: 97% (24 Feb 2021 11:51) (94% - 97%)    LABS:                        12.3   5.61  )-----------( 163      ( 23 Feb 2021 06:58 )             40.8     02-24    138  |  106  |  12  ----------------------------<  113<H>  4.2   |  19<L>  |  0.44<L>    Ca    8.5      24 Feb 2021 09:35  Phos  3.1     02-24  Mg     2.2     02-24    TPro  6.1  /  Alb  2.8<L>  /  TBili  0.3  /  DBili  x   /  AST  25  /  ALT  21  /  AlkPhos  176<H>  02-24    PT/INR - ( 22 Feb 2021 18:40 )   PT: 14.0 sec;   INR: 1.18 ratio         PTT - ( 22 Feb 2021 18:40 )  PTT:27.4 sec    CAPILLARY BLOOD GLUCOSE      POCT Blood Glucose.: 122 mg/dL (24 Feb 2021 08:14)      Lower Extremity Physical Exam:      Vascular: DP 1/4 bilateral, PT not palpable, B/L, Temperature gradient WNL, B/L.   Neuro: Epicritic sensation reduced to the level of digits, B/L.  Musculoskeletal/Ortho: prone to contracture of LE, unremarkable otherwise    Right foot dorsal fifth digit wound at the PIPJ to bone w/ fibrogranular wound bed, no periwound erythema, no malodor, no local signs of infection.    Left foot medial first MPJ wound to bone w/ fibrotic wound base, w/ periwound erythema 1cm beyond the wound edges. There is no active drainage, or purulence noted.     RADIOLOGY & ADDITIONAL TESTS:  < from: Xray Foot AP + Lateral + Oblique, Bilat (02.22.21 @ 17:49) >    EXAM:  FOOT COMPLETE BILAT-MIN 3 VIEWS                            PROCEDURE DATE:  02/22/2021            INTERPRETATION:  CLINICAL INDICATION: Foot ulcer.    TECHNIQUE: 3 views of the bilateral feet.    COMPARISON: No similar prior studies available for comparison.    IMPRESSION:    Soft tissue ulceration along the medial aspect of the first metatarsophalangeal joint on the left. Mild cortical irregularity of the adjacent medial aspect of the first metatarsal head may be degenerative/enthesopathic change or secondary to osteomyelitis. Correlate with pending MRI.    Otherwise, no radiographic evidence of osteomyelitis. No acute fractures or dislocations are demonstrated. Osteoarthritis at several PIP and DIP joints bilaterally. Bilateral calcaneal enthesophytes. Vascular calcifications.                FADI PARKER MD; Resident Radiology  This document has been electronically signed.  DENTON PEREZ MD; Attending Radiologist  This document has been electronically signed. Feb 23 2021 10:57AM    < end of copied text >  
Moderate protein malnutrition
Moderate protein malnutrition
Advanced care planning/counseling discussion
Need for prophylactic measure
Advance care planning
Need for prophylactic measure
Need for prophylactic measure

## 2021-04-29 NOTE — ED PROVIDER NOTE - PRINCIPAL DIAGNOSIS
Fall, initial encounter Z Plasty Text: The lesion was extirpated to the level of the fat with a #15 scalpel blade.  Given the location of the defect, shape of the defect and the proximity to free margins a Z-plasty was deemed most appropriate for repair.  Using a sterile surgical marker, the appropriate transposition arms of the Z-plasty were drawn incorporating the defect and placing the expected incisions within the relaxed skin tension lines where possible.    The area thus outlined was incised deep to adipose tissue with a #15 scalpel blade.  The skin margins were undermined to an appropriate distance in all directions utilizing iris scissors.  The opposing transposition arms were then transposed into place in opposite direction and anchored with interrupted buried subcutaneous sutures.

## 2021-05-15 NOTE — PROGRESS NOTE ADULT - PROBLEM/PLAN-9
DISPLAY PLAN FREE TEXT
5
DISPLAY PLAN FREE TEXT

## 2021-06-24 NOTE — PROGRESS NOTE ADULT - SUBJECTIVE AND OBJECTIVE BOX
Patient calls stating she does not want to start Gemzar on 6/25 and would like to postpone until 7/5. She does not wish to be sick for the holiday. Appointments adjusted to reflect patient's request.   Patient is a 89y old  Female who presents with a chief complaint of CVA (22 Nov 2017 09:17)      SUBJECTIVE / OVERNIGHT EVENTS: No acute events overnight. Denies chest pain, SOB. States her left hand is itchy. Daughter at bedside    MEDICATIONS  (STANDING):  artificial tears (preservative free) Ophthalmic Solution 1 Drop(s) Left EYE two times a day  enoxaparin Injectable 70 milliGRAM(s) SubCutaneous two times a day  lactobacillus acidophilus 1 Tablet(s) Oral three times a day with meals  metoprolol     tartrate 12.5 milliGRAM(s) Oral two times a day  pantoprazole    Tablet 40 milliGRAM(s) Oral two times a day before meals  sucralfate 1 Gram(s) Oral four times a day  warfarin 5 milliGRAM(s) Oral once    MEDICATIONS  (PRN):  acetaminophen   Tablet 650 milliGRAM(s) Oral every 6 hours PRN For Temp greater than 38 C (100.4 F)  acetaminophen   Tablet. 650 milliGRAM(s) Oral every 6 hours PRN Mild Pain (1 - 3)      T(C): 36.8 (12-10-17 @ 06:35), Max: 37.3 (12-09-17 @ 23:10)  HR: 60 (12-10-17 @ 06:35) (60 - 78)  BP: 144/62 (12-10-17 @ 06:35) (144/62 - 164/77)  RR: 16 (12-10-17 @ 06:35) (16 - 18)  SpO2: 100% (12-10-17 @ 06:35) (100% - 100%)  CAPILLARY BLOOD GLUCOSE        I&O's Summary      PHYSICAL EXAM:  GENERAL: no apparent distress, on room air  EYES: sclera clear b/l  CHEST/LUNG: Clear to auscultation bilaterally; No wheezing or crackles  HEART: S1/S2  ABDOMEN: Soft, Nontender, Nondistended; Bowel sounds present  EXTREMITIES:  No clubbing, cyanosis, or edema  NEUROLOGY: awake, alert, responds to Qs appropriately    LABS:                        11.4   6.63  )-----------( 295      ( 10 Dec 2017 07:38 )             37.9     12-10    146<H>  |  110<H>  |  8   ----------------------------<  88  3.9   |  25  |  0.75    Ca    9.0      10 Dec 2017 07:38      PT/INR - ( 10 Dec 2017 07:38 )   PT: 25.7 SEC;   INR: 2.28          PTT - ( 10 Dec 2017 07:38 )  PTT:64.3 SEC  CARDIAC MARKERS ( 09 Dec 2017 07:15 )  x     / < 0.06 ng/mL / 38 u/L / 1.49 ng/mL / x              RADIOLOGY & ADDITIONAL TESTS:

## 2022-02-14 NOTE — PHYSICAL THERAPY INITIAL EVALUATION ADULT - PERSONAL SAFETY AND JUDGMENT, REHAB EVAL
"Per 2/13/22 Urgent Care note:  \"Radiology recommends follow up CT- I've asked the patient to make an appointment with her PCP in the next 1-2 weeks for this. Starting antibiotic today. \"    Left message to call back and ask to speak with an available triage nurse.  MARIA ANTONIA SmithN, RN  [facility]            " intact

## 2023-04-05 NOTE — PROGRESS NOTE ADULT - PROBLEM SELECTOR PLAN 7
restart Zoloft once off NPO Plastic Surgeon Procedure Text (A): After obtaining clear surgical margins the patient was sent to plastics for surgical repair.  The patient understands they will receive post-surgical care and follow-up from the referring physician's office. Discussed with daughter Karma- pt has been off Zoloft  for more than a year as per doctors recommendation.   will not restart it as pt without any acute symptoms at this time

## 2023-08-31 NOTE — PROGRESS NOTE ADULT - PROBLEM SELECTOR PLAN 1
- new right MCA infarct s/p TPA in ED   - neurology workup appreciated  - TTE negative for LV or Atrial thrombus  - given the patients history of a life threatening gi bleed during her hospital stay in [place] and inability to undergo endoscopic evaluation d/t her cardiac comorbidities she would be at a moderate to high risk for rebleeding if anticoagulation were to be restarted  - occult negative (-) and has no overt/occult signs of GI bleeding   - Video Capsule EGD not performed as patient did not want to attempt swallowing due to size of the capsule  - discussed at length with daughter and patient with agreement and understanding that given negative occult and stable h/h role for endoscopic evaluation would not be beneficial and risks of undergoing anesthesia outweigh the benefits  - cont protonix bid while on eliquis and carafate  - recommend palliative/hospice evaluation re: GOC Ivermectin Pregnancy And Lactation Text: This medication is Pregnancy Category C and it isn't known if it is safe during pregnancy. It is also excreted in breast milk.

## 2024-02-25 NOTE — PROGRESS NOTE ADULT - ASSESSMENT
Cali PERSAUD Aas is a 59 year old female presenting with bilateral neck and inner ear discomfort.   Symptoms started one day ago   OTC medications used: Tylenol   Denies  known Latex allergy or symptoms of Latex sensitivity.  Social History     Tobacco Use   Smoking Status Every Day    Current packs/day: 0.50    Types: Cigarettes   Smokeless Tobacco Never   Tobacco Comments    about 5 cigarettes per day now.     All allergies and medications reviewed.  PCP verified:  Theron Stack DO  Pharmacy verified:  [facility] [address] [place] [address]   Patient would like communication of their results via:      Cell Phone:   Telephone Information:   Mobile [phone]     Okay to leave a message containing results? Yes         89 yo F prior hx CVA, PUD, prior GIB, OA/RA, p/w left sided weakness, s/p tPA, found to have a right MCA infarct in imaging. Course c/b fever (2/2 aspiration PNA vs UTI), (CXR concerning for possible PNA, Ucx growing >100K Klebsiella), clinically improving on Zosyn.

## 2024-10-22 NOTE — SWALLOW BEDSIDE ASSESSMENT ADULT - ASR SWALLOW LINGUAL MOBILITY
POSITIONAL SLEEP DEVICES:  Devices that maintain sleeping on the back to prevent snoring and mild sleep apnea.    [email or web address]  [email or web address]  [email or web address]"    
impaired protrusion/impaired anterior elevation/impaired left lateral movement/impaired right lateral movement/tongue deviation to L side on protrusion

## 2024-11-21 NOTE — ED ADULT TRIAGE NOTE - CCCP TRG CHIEF CMPLNT
head injury Include Z78.9 (Other Specified Conditions Influencing Health Status) As An Associated Diagnosis?: No Post-Care Instructions: I reviewed with the patient in detail post-care instructions. Patient is to wear sunprotection, and avoid picking at any of the treated lesions. Pt may apply Vaseline to crusted or scabbing areas. Patient is aware cryo will likely cause blister, scab, and that multiple treatments may be necessary Show Spray Paint Technique Variable?: Yes Application Tool (Optional): Liquid Nitrogen Sprayer Medical Necessity Clause: This procedure was medically necessary because the lesions that were treated were: Spray Paint Text: The liquid nitrogen was applied to the skin utilizing a spray paint frosting technique. Consent: The patient's consent was obtained including but not limited to risks of crusting, scabbing, blistering, scarring, darker or lighter pigmentary change, recurrence, incomplete removal and infection. Medical Necessity Information: It is in your best interest to select a reason for this procedure from the list below. All of these items fulfill various CMS LCD requirements except the new and changing color options. Detail Level: Detailed

## 2024-12-30 NOTE — PROGRESS NOTE ADULT - ASSESSMENT
Spoke with pt, gave results and updated her contact information. Verified her upcoming appt for 1/6/25 @ 1:15p   89F with PMH of depression, osteoarthritis, rheumatoid arthritis, TIA and stroke in 2016, p/w RT MCA infarct. Cardiology called to evaluate for anticoagulation risk/benefit. CHADSVASC is 5.  HASBLED score is 5. Today the patient went into atrial fibrillation in the setting of fever and possible  pneumonia.  I called her daughter Karma ([phone]) reiterating her mothers stroke risk but she remains reluctant to allow anticoagulation given no plans for GI evaluation.  Patient no longer requires Cardionet or ILR monitor.   Will wait to hear from patient's daughter.  In the meantime, would use low dose beta blocker for better rate control.
